# Patient Record
Sex: MALE | Race: WHITE | NOT HISPANIC OR LATINO | URBAN - METROPOLITAN AREA
[De-identification: names, ages, dates, MRNs, and addresses within clinical notes are randomized per-mention and may not be internally consistent; named-entity substitution may affect disease eponyms.]

---

## 2017-04-03 ENCOUNTER — OUTPATIENT (OUTPATIENT)
Dept: OUTPATIENT SERVICES | Facility: HOSPITAL | Age: 72
LOS: 1 days | Discharge: HOME | End: 2017-04-03

## 2017-06-27 DIAGNOSIS — K85.90 ACUTE PANCREATITIS WITHOUT NECROSIS OR INFECTION, UNSPECIFIED: ICD-10-CM

## 2021-03-18 ENCOUNTER — INPATIENT (INPATIENT)
Facility: HOSPITAL | Age: 76
LOS: 5 days | Discharge: HOME HEALTH | End: 2021-03-24
Attending: HOSPITALIST | Admitting: HOSPITALIST
Payer: MEDICARE

## 2021-03-18 VITALS
SYSTOLIC BLOOD PRESSURE: 176 MMHG | TEMPERATURE: 98 F | OXYGEN SATURATION: 98 % | DIASTOLIC BLOOD PRESSURE: 80 MMHG | RESPIRATION RATE: 18 BRPM | HEART RATE: 68 BPM

## 2021-03-18 LAB
ALBUMIN SERPL ELPH-MCNC: 4.1 G/DL — SIGNIFICANT CHANGE UP (ref 3.5–5.2)
ALP SERPL-CCNC: 110 U/L — SIGNIFICANT CHANGE UP (ref 30–115)
ALT FLD-CCNC: 29 U/L — SIGNIFICANT CHANGE UP (ref 0–41)
ANION GAP SERPL CALC-SCNC: 16 MMOL/L — HIGH (ref 7–14)
APTT BLD: 34.2 SEC — SIGNIFICANT CHANGE UP (ref 27–39.2)
AST SERPL-CCNC: 27 U/L — SIGNIFICANT CHANGE UP (ref 0–41)
BASOPHILS # BLD AUTO: 0.02 K/UL — SIGNIFICANT CHANGE UP (ref 0–0.2)
BASOPHILS NFR BLD AUTO: 0.8 % — SIGNIFICANT CHANGE UP (ref 0–1)
BILIRUB SERPL-MCNC: 0.6 MG/DL — SIGNIFICANT CHANGE UP (ref 0.2–1.2)
BUN SERPL-MCNC: 92 MG/DL — CRITICAL HIGH (ref 10–20)
CALCIUM SERPL-MCNC: 7.2 MG/DL — LOW (ref 8.5–10.1)
CHLORIDE SERPL-SCNC: 95 MMOL/L — LOW (ref 98–110)
CO2 SERPL-SCNC: 22 MMOL/L — SIGNIFICANT CHANGE UP (ref 17–32)
CREAT SERPL-MCNC: 4.9 MG/DL — CRITICAL HIGH (ref 0.7–1.5)
EOSINOPHIL # BLD AUTO: 0.14 K/UL — SIGNIFICANT CHANGE UP (ref 0–0.7)
EOSINOPHIL NFR BLD AUTO: 5.8 % — SIGNIFICANT CHANGE UP (ref 0–8)
ETHANOL SERPL-MCNC: <10 MG/DL — SIGNIFICANT CHANGE UP
GLUCOSE BLDC GLUCOMTR-MCNC: 190 MG/DL — HIGH (ref 70–99)
GLUCOSE SERPL-MCNC: 273 MG/DL — HIGH (ref 70–99)
HCT VFR BLD CALC: 28.6 % — LOW (ref 42–52)
HGB BLD-MCNC: 9.6 G/DL — LOW (ref 14–18)
IMM GRANULOCYTES NFR BLD AUTO: 0.4 % — HIGH (ref 0.1–0.3)
INR BLD: 1.1 RATIO — SIGNIFICANT CHANGE UP (ref 0.65–1.3)
LYMPHOCYTES # BLD AUTO: 0.53 K/UL — LOW (ref 1.2–3.4)
LYMPHOCYTES # BLD AUTO: 21.8 % — SIGNIFICANT CHANGE UP (ref 20.5–51.1)
MCHC RBC-ENTMCNC: 30.5 PG — SIGNIFICANT CHANGE UP (ref 27–31)
MCHC RBC-ENTMCNC: 33.6 G/DL — SIGNIFICANT CHANGE UP (ref 32–37)
MCV RBC AUTO: 90.8 FL — SIGNIFICANT CHANGE UP (ref 80–94)
MONOCYTES # BLD AUTO: 0.18 K/UL — SIGNIFICANT CHANGE UP (ref 0.1–0.6)
MONOCYTES NFR BLD AUTO: 7.4 % — SIGNIFICANT CHANGE UP (ref 1.7–9.3)
NEUTROPHILS # BLD AUTO: 1.55 K/UL — SIGNIFICANT CHANGE UP (ref 1.4–6.5)
NEUTROPHILS NFR BLD AUTO: 63.8 % — SIGNIFICANT CHANGE UP (ref 42.2–75.2)
NRBC # BLD: 0 /100 WBCS — SIGNIFICANT CHANGE UP (ref 0–0)
PLATELET # BLD AUTO: 173 K/UL — SIGNIFICANT CHANGE UP (ref 130–400)
POTASSIUM SERPL-MCNC: 4.5 MMOL/L — SIGNIFICANT CHANGE UP (ref 3.5–5)
POTASSIUM SERPL-SCNC: 4.5 MMOL/L — SIGNIFICANT CHANGE UP (ref 3.5–5)
PROT SERPL-MCNC: 7.1 G/DL — SIGNIFICANT CHANGE UP (ref 6–8)
PROTHROM AB SERPL-ACNC: 12.7 SEC — SIGNIFICANT CHANGE UP (ref 9.95–12.87)
RBC # BLD: 3.15 M/UL — LOW (ref 4.7–6.1)
RBC # FLD: 13.2 % — SIGNIFICANT CHANGE UP (ref 11.5–14.5)
SARS-COV-2 RNA SPEC QL NAA+PROBE: SIGNIFICANT CHANGE UP
SODIUM SERPL-SCNC: 133 MMOL/L — LOW (ref 135–146)
WBC # BLD: 2.43 K/UL — LOW (ref 4.8–10.8)
WBC # FLD AUTO: 2.43 K/UL — LOW (ref 4.8–10.8)

## 2021-03-18 PROCEDURE — 73552 X-RAY EXAM OF FEMUR 2/>: CPT | Mod: 26,LT

## 2021-03-18 PROCEDURE — 71045 X-RAY EXAM CHEST 1 VIEW: CPT | Mod: 26

## 2021-03-18 PROCEDURE — 99285 EMERGENCY DEPT VISIT HI MDM: CPT | Mod: CS,GC

## 2021-03-18 PROCEDURE — 72170 X-RAY EXAM OF PELVIS: CPT | Mod: 26

## 2021-03-18 RX ORDER — INSULIN GLARGINE 100 [IU]/ML
10 INJECTION, SOLUTION SUBCUTANEOUS AT BEDTIME
Refills: 0 | Status: DISCONTINUED | OUTPATIENT
Start: 2021-03-18 | End: 2021-03-20

## 2021-03-18 RX ORDER — SODIUM CHLORIDE 9 MG/ML
1000 INJECTION, SOLUTION INTRAVENOUS
Refills: 0 | Status: DISCONTINUED | OUTPATIENT
Start: 2021-03-18 | End: 2021-03-20

## 2021-03-18 RX ORDER — CHLORHEXIDINE GLUCONATE 213 G/1000ML
1 SOLUTION TOPICAL
Refills: 0 | Status: DISCONTINUED | OUTPATIENT
Start: 2021-03-18 | End: 2021-03-20

## 2021-03-18 RX ORDER — GLUCAGON INJECTION, SOLUTION 0.5 MG/.1ML
1 INJECTION, SOLUTION SUBCUTANEOUS ONCE
Refills: 0 | Status: DISCONTINUED | OUTPATIENT
Start: 2021-03-18 | End: 2021-03-20

## 2021-03-18 RX ORDER — SODIUM CHLORIDE 9 MG/ML
1000 INJECTION INTRAMUSCULAR; INTRAVENOUS; SUBCUTANEOUS
Refills: 0 | Status: DISCONTINUED | OUTPATIENT
Start: 2021-03-18 | End: 2021-03-20

## 2021-03-18 RX ORDER — INSULIN LISPRO 100/ML
3 VIAL (ML) SUBCUTANEOUS
Refills: 0 | Status: DISCONTINUED | OUTPATIENT
Start: 2021-03-18 | End: 2021-03-20

## 2021-03-18 RX ORDER — DEXTROSE 50 % IN WATER 50 %
25 SYRINGE (ML) INTRAVENOUS ONCE
Refills: 0 | Status: DISCONTINUED | OUTPATIENT
Start: 2021-03-18 | End: 2021-03-20

## 2021-03-18 RX ORDER — DEXTROSE 50 % IN WATER 50 %
15 SYRINGE (ML) INTRAVENOUS ONCE
Refills: 0 | Status: DISCONTINUED | OUTPATIENT
Start: 2021-03-18 | End: 2021-03-20

## 2021-03-18 RX ORDER — POLYETHYLENE GLYCOL 3350 17 G/17G
17 POWDER, FOR SOLUTION ORAL DAILY
Refills: 0 | Status: DISCONTINUED | OUTPATIENT
Start: 2021-03-18 | End: 2021-03-20

## 2021-03-18 RX ORDER — MORPHINE SULFATE 50 MG/1
2 CAPSULE, EXTENDED RELEASE ORAL EVERY 6 HOURS
Refills: 0 | Status: DISCONTINUED | OUTPATIENT
Start: 2021-03-18 | End: 2021-03-19

## 2021-03-18 RX ORDER — DEXTROSE 50 % IN WATER 50 %
12.5 SYRINGE (ML) INTRAVENOUS ONCE
Refills: 0 | Status: DISCONTINUED | OUTPATIENT
Start: 2021-03-18 | End: 2021-03-20

## 2021-03-18 RX ORDER — MORPHINE SULFATE 50 MG/1
4 CAPSULE, EXTENDED RELEASE ORAL ONCE
Refills: 0 | Status: DISCONTINUED | OUTPATIENT
Start: 2021-03-18 | End: 2021-03-18

## 2021-03-18 RX ORDER — INSULIN LISPRO 100/ML
VIAL (ML) SUBCUTANEOUS
Refills: 0 | Status: DISCONTINUED | OUTPATIENT
Start: 2021-03-18 | End: 2021-03-20

## 2021-03-18 RX ADMIN — INSULIN GLARGINE 10 UNIT(S): 100 INJECTION, SOLUTION SUBCUTANEOUS at 23:21

## 2021-03-18 RX ADMIN — SODIUM CHLORIDE 75 MILLILITER(S): 9 INJECTION INTRAMUSCULAR; INTRAVENOUS; SUBCUTANEOUS at 23:25

## 2021-03-18 RX ADMIN — MORPHINE SULFATE 2 MILLIGRAM(S): 50 CAPSULE, EXTENDED RELEASE ORAL at 23:24

## 2021-03-18 RX ADMIN — MORPHINE SULFATE 4 MILLIGRAM(S): 50 CAPSULE, EXTENDED RELEASE ORAL at 18:58

## 2021-03-18 NOTE — ED PROVIDER NOTE - CARE PLAN
Principal Discharge DX:	Closed nondisplaced intertrochanteric fracture of left femur, initial encounter  Secondary Diagnosis:	CKD (chronic kidney disease), stage V  Secondary Diagnosis:	Anemia

## 2021-03-18 NOTE — ED PROVIDER NOTE - PROGRESS NOTE DETAILS
BI: unable to reach ortho at this time. Placed official consult. Endorsed to MAR. They will try for ortho consult as well. BI: d/w ortho resident joey

## 2021-03-18 NOTE — ED PROVIDER NOTE - OBJECTIVE STATEMENT
75 y.o M w/ pmhx CKD V, CAD s/p PCI, DM p/w mechanical trip and fall outside landing on his L bottom pta. Pt complaining of L hip pain and hasn't been able to stand or ambulate since fall. No HA, no neck pain, no HT, no LOC, no CP, no SOB, no back pain, no n/v, no abd pain, no numbness or tingling.

## 2021-03-18 NOTE — ED PROVIDER NOTE - ATTENDING CONTRIBUTION TO CARE
74 yo M pmh of DM, CAD, CKD presents after a mechanical fall. Was in his driveway that he describes as steep. States that he slipped and fell onto left hip. Felt immediate pain and has since been unable to ambulate. no head trauma, no loc, no blood thinning medications. no other injuries or pain besides his left hip.     CONSTITUTIONAL: Well-developed; well-nourished; in no acute distress.   SKIN: warm, dry  HEAD: Normocephalic; atraumatic.  EYES: PERRL, EOMI, no conjunctival erythema  ENT: No nasal discharge; airway clear.  NECK: Supple; non tender.  CARD: S1, S2 normal;  Regular rate and rhythm.   RESP: No wheezes, rales or rhonchi.  ABD: soft non tender, non distended, no rebound or guarding  EXT: Normal ROM.  5/5 strength in all 4 extremities + tenderness to the left hip, no leg shortening or rotation, range of motion limited due to pain. neurovascularly intact.   LYMPH: No acute cervical adenopathy.  NEURO: Alert, oriented, grossly unremarkable. neurovascularly intact  PSYCH: Cooperative, appropriate.

## 2021-03-18 NOTE — CONSULT NOTE ADULT - SUBJECTIVE AND OBJECTIVE BOX
Orthopedics Consult Note:    This is a 75y Male who presents to the ED today with c/o left hip pain and inability to bear weight s/p mechanical fall today. Pt denies any other injuries. Pt denies head trauma or LOC. Pt denies any numbness, tingling or parethesias. Pt denies fever or chills.    Past Medical & Surgical History:  CKD (chronic kidney disease)  CAD (coronary artery disease)  DM (diabetes mellitus)  HTN  HLD    Allergies:  No Known Allergies      Vital Signs:  T(C): 36.4 (03-18-21 @ 18:15), Max: 36.4 (03-18-21 @ 18:15)  HR: 68 (03-18-21 @ 18:15) (68 - 68)  BP: 176/80 (03-18-21 @ 18:15) (176/80 - 176/80)  RR: 18 (03-18-21 @ 18:15) (18 - 18)  SpO2: 98% (03-18-21 @ 18:15) (98% - 98%)    Labs:                        9.6    2.43  )-----------( 173      ( 18 Mar 2021 19:10 )             28.6   03-18    133<L>  |  95<L>  |  92<HH>  ----------------------------<  273<H>  4.5   |  22  |  4.9<HH>    Ca    7.2<L>      18 Mar 2021 19:10    TPro  7.1  /  Alb  4.1  /  TBili  0.6  /  DBili  x   /  AST  27  /  ALT  29  /  AlkPhos  110  03-18  PT/INR - ( 18 Mar 2021 19:10 )   PT: 12.70 sec;   INR: 1.10 ratio         PTT - ( 18 Mar 2021 19:10 )  PTT:34.2 sec    X-rays of the pelvis, left hip and femur demonstrate intertrochanteric hip fracture.    PE  NAD, alert and oriented  Non labored respirations on RA  Left hip:  Extremity shortened and externally rotated, +mild swelling, no ecchymosis, no erythema, skin intact, normothermic. +TTP over groin. LROM 2' pain. Moving all toes, sensation intact. DP and PT pulses 2+.    Secondary Survey:  Righ knee, ankle and B/L UEs with no swelling, no ecchymoses, no abrasions, no lacerations or any other signs of injury with full painless baseline ROM and no bony TTP. Able to SLR RLE**/**LLE. Sensation intact distally, moving all digits. Distal pulses intact.     Spine and ribs with no bony TTP.     Assessment:  75y Male with a left IT hip fracture s/p mechanical fall today.    Plan:  Pt and family advised that surgical fixation of left hip fracture with is necessary.  Surgical procedure discussed in detail with pt and family including all R/B/As; Pt and daughter expressed understanding  Admit to Medical service for optimization and medical clearance.  Please obtain CT of entire left femur for surgical planning  Patient will require cardiac clearance  f/u medical clearance.  f/u labs/imaging.  Complete bedrest.  Pain control.  NPO and hold chemical anticoagulation after midnight for possible OR tomorrow pending medical optimization and clearance.   DVT ppx 1 dose HSQ now, SCDs    Case discussed with Dr. Carrillo who agrees with plan. Orthopedics Consult Note:    This is a 75y Male who presents to the ED today with c/o left hip pain and inability to bear weight s/p mechanical fall today. Pt denies any other injuries. Pt denies head trauma or LOC. Pt denies any numbness, tingling or parethesias. Pt denies fever or chills.    Past Medical & Surgical History:  CKD (chronic kidney disease)  CAD (coronary artery disease)  DM (diabetes mellitus)  HTN  HLD    Allergies:  No Known Allergies      Vital Signs:  T(C): 36.4 (03-18-21 @ 18:15), Max: 36.4 (03-18-21 @ 18:15)  HR: 68 (03-18-21 @ 18:15) (68 - 68)  BP: 176/80 (03-18-21 @ 18:15) (176/80 - 176/80)  RR: 18 (03-18-21 @ 18:15) (18 - 18)  SpO2: 98% (03-18-21 @ 18:15) (98% - 98%)    Labs:                        9.6    2.43  )-----------( 173      ( 18 Mar 2021 19:10 )             28.6   03-18    133<L>  |  95<L>  |  92<HH>  ----------------------------<  273<H>  4.5   |  22  |  4.9<HH>    Ca    7.2<L>      18 Mar 2021 19:10    TPro  7.1  /  Alb  4.1  /  TBili  0.6  /  DBili  x   /  AST  27  /  ALT  29  /  AlkPhos  110  03-18  PT/INR - ( 18 Mar 2021 19:10 )   PT: 12.70 sec;   INR: 1.10 ratio         PTT - ( 18 Mar 2021 19:10 )  PTT:34.2 sec    X-rays of the pelvis, left hip and femur demonstrate intertrochanteric hip fracture.    PE  NAD, alert and oriented  Non labored respirations on RA  Left hip:  Extremity shortened and externally rotated, +mild swelling, no ecchymosis, no erythema, skin intact, normothermic. +TTP over groin. LROM 2' pain. Moving all toes, sensation intact. DP and PT pulses 2+.    Secondary Survey:  Righ knee, ankle and B/L UEs with no swelling, no ecchymoses, no abrasions, no lacerations or any other signs of injury with full painless baseline ROM and no bony TTP. Able to SLR RLE**/**LLE. Sensation intact distally, moving all digits. Distal pulses intact.     Spine and ribs with no bony TTP.     Assessment:  75y Male with a left IT hip fracture s/p mechanical fall today.    Plan:  Pt and family advised that surgical fixation of left hip fracture with is necessary.  Surgical procedure discussed in detail with pt and family including all R/B/As; Pt and daughter expressed understanding  Admit to Medical service for optimization and medical clearance.  Please obtain CT of entire left femur for surgical planning  f/u medical clearance.  f/u labs/imaging.  Complete bedrest.  Pain control.  NPO and hold chemical anticoagulation after midnight for possible OR tomorrow pending medical optimization and clearance.   DVT ppx 1 dose HSQ now, SCDs    Case discussed with Dr. Carrillo who agrees with plan.

## 2021-03-18 NOTE — ED ADULT NURSE NOTE - NSIMPLEMENTINTERV_GEN_ALL_ED
Implemented All Fall with Harm Risk Interventions:  [place] to call system. Call bell, personal items and telephone within reach. Instruct patient to call for assistance. Room bathroom lighting operational. Non-slip footwear when patient is off stretcher. Physically safe environment: no spills, clutter or unnecessary equipment. Stretcher in lowest position, wheels locked, appropriate side rails in place. Provide visual cue, wrist band, yellow gown, etc. Monitor gait and stability. Monitor for mental status changes and reorient to person, place, and time. Review medications for side effects contributing to fall risk. Reinforce activity limits and safety measures with patient and family. Provide visual clues: red socks.

## 2021-03-18 NOTE — ED PROVIDER NOTE - CLINICAL SUMMARY MEDICAL DECISION MAKING FREE TEXT BOX
Patient presents after a mechanical fall. xray, labs done. Found to have a left intertrochanteric fracture. Pain medications given. Ortho consulted. Admitted for further management.

## 2021-03-18 NOTE — ED PROVIDER NOTE - ATTENDING WITH...
Resident Clofazimine Counseling:  I discussed with the patient the risks of clofazimine including but not limited to skin and eye pigmentation, liver damage, nausea/vomiting, gastrointestinal bleeding and allergy.

## 2021-03-18 NOTE — ED PROVIDER NOTE - PHYSICAL EXAMINATION
CONSTITUTIONAL: well-appearing, in NAD, GCS 15  SKIN: Warm dry, normal skin turgor, no abrasions or lacerations.  HEAD: NCAT  EYES: EOMI, PERRLA, no scleral icterus, conjunctiva pink  ENT: no epistaxis or blood in the oropharynx  NECK: Supple; non tender. Full ROM. trachea midline.  CARD: RRR, no murmurs.  THORAX: no chest wall tenderness.  RESP: b/l breath sounds CTABL  ABD: soft, non-tender, non-distended, no rebound or guarding.  EXT: Unable to lift LLE due to hip pain, otherwise Full ROM, Bony tenderness over L greater trochanter, no pedal edema, no calf tenderness, stable pelvis, no spinal back tenderness. peripheral pulses intact and symmetrical.  NEURO: distal neuro intact and symmetrical.  PSYCH: Cooperative, appropriate.

## 2021-03-19 LAB
24R-OH-CALCIDIOL SERPL-MCNC: 14 NG/ML — LOW (ref 30–80)
A1C WITH ESTIMATED AVERAGE GLUCOSE RESULT: 6.4 % — HIGH (ref 4–5.6)
ALBUMIN SERPL ELPH-MCNC: 3.9 G/DL — SIGNIFICANT CHANGE UP (ref 3.5–5.2)
ALP SERPL-CCNC: 107 U/L — SIGNIFICANT CHANGE UP (ref 30–115)
ALT FLD-CCNC: 26 U/L — SIGNIFICANT CHANGE UP (ref 0–41)
ANION GAP SERPL CALC-SCNC: 16 MMOL/L — HIGH (ref 7–14)
AST SERPL-CCNC: 20 U/L — SIGNIFICANT CHANGE UP (ref 0–41)
BASOPHILS # BLD AUTO: 0.02 K/UL — SIGNIFICANT CHANGE UP (ref 0–0.2)
BASOPHILS NFR BLD AUTO: 0.6 % — SIGNIFICANT CHANGE UP (ref 0–1)
BILIRUB SERPL-MCNC: 0.5 MG/DL — SIGNIFICANT CHANGE UP (ref 0.2–1.2)
BUN SERPL-MCNC: 87 MG/DL — CRITICAL HIGH (ref 10–20)
CALCIUM SERPL-MCNC: 7.1 MG/DL — LOW (ref 8.5–10.1)
CHLORIDE SERPL-SCNC: 96 MMOL/L — LOW (ref 98–110)
CHOLEST SERPL-MCNC: 150 MG/DL — SIGNIFICANT CHANGE UP
CO2 SERPL-SCNC: 23 MMOL/L — SIGNIFICANT CHANGE UP (ref 17–32)
CREAT SERPL-MCNC: 4.5 MG/DL — CRITICAL HIGH (ref 0.7–1.5)
EOSINOPHIL # BLD AUTO: 0.06 K/UL — SIGNIFICANT CHANGE UP (ref 0–0.7)
EOSINOPHIL NFR BLD AUTO: 1.7 % — SIGNIFICANT CHANGE UP (ref 0–8)
ESTIMATED AVERAGE GLUCOSE: 137 MG/DL — HIGH (ref 68–114)
GLUCOSE BLDC GLUCOMTR-MCNC: 100 MG/DL — HIGH (ref 70–99)
GLUCOSE BLDC GLUCOMTR-MCNC: 112 MG/DL — HIGH (ref 70–99)
GLUCOSE BLDC GLUCOMTR-MCNC: 128 MG/DL — HIGH (ref 70–99)
GLUCOSE BLDC GLUCOMTR-MCNC: 83 MG/DL — SIGNIFICANT CHANGE UP (ref 70–99)
GLUCOSE BLDC GLUCOMTR-MCNC: 87 MG/DL — SIGNIFICANT CHANGE UP (ref 70–99)
GLUCOSE SERPL-MCNC: 124 MG/DL — HIGH (ref 70–99)
HCT VFR BLD CALC: 27.7 % — LOW (ref 42–52)
HCT VFR BLD CALC: 28.3 % — LOW (ref 42–52)
HCV AB S/CO SERPL IA: 0.04 COI — SIGNIFICANT CHANGE UP
HCV AB SERPL-IMP: SIGNIFICANT CHANGE UP
HDLC SERPL-MCNC: 37 MG/DL — LOW
HGB BLD-MCNC: 9.2 G/DL — LOW (ref 14–18)
HGB BLD-MCNC: 9.4 G/DL — LOW (ref 14–18)
IMM GRANULOCYTES NFR BLD AUTO: 0.3 % — SIGNIFICANT CHANGE UP (ref 0.1–0.3)
LACTATE SERPL-SCNC: 0.7 MMOL/L — SIGNIFICANT CHANGE UP (ref 0.7–2)
LIPID PNL WITH DIRECT LDL SERPL: 107 MG/DL — HIGH
LYMPHOCYTES # BLD AUTO: 0.49 K/UL — LOW (ref 1.2–3.4)
LYMPHOCYTES # BLD AUTO: 13.7 % — LOW (ref 20.5–51.1)
MAGNESIUM SERPL-MCNC: 1.8 MG/DL — SIGNIFICANT CHANGE UP (ref 1.8–2.4)
MCHC RBC-ENTMCNC: 30.4 PG — SIGNIFICANT CHANGE UP (ref 27–31)
MCHC RBC-ENTMCNC: 30.6 PG — SIGNIFICANT CHANGE UP (ref 27–31)
MCHC RBC-ENTMCNC: 33.2 G/DL — SIGNIFICANT CHANGE UP (ref 32–37)
MCHC RBC-ENTMCNC: 33.2 G/DL — SIGNIFICANT CHANGE UP (ref 32–37)
MCV RBC AUTO: 91.6 FL — SIGNIFICANT CHANGE UP (ref 80–94)
MCV RBC AUTO: 92 FL — SIGNIFICANT CHANGE UP (ref 80–94)
MONOCYTES # BLD AUTO: 0.24 K/UL — SIGNIFICANT CHANGE UP (ref 0.1–0.6)
MONOCYTES NFR BLD AUTO: 6.7 % — SIGNIFICANT CHANGE UP (ref 1.7–9.3)
NEUTROPHILS # BLD AUTO: 2.75 K/UL — SIGNIFICANT CHANGE UP (ref 1.4–6.5)
NEUTROPHILS NFR BLD AUTO: 77 % — HIGH (ref 42.2–75.2)
NON HDL CHOLESTEROL: 113 MG/DL — SIGNIFICANT CHANGE UP
NRBC # BLD: 0 /100 WBCS — SIGNIFICANT CHANGE UP (ref 0–0)
NRBC # BLD: 0 /100 WBCS — SIGNIFICANT CHANGE UP (ref 0–0)
PHOSPHATE SERPL-MCNC: 5.8 MG/DL — HIGH (ref 2.1–4.9)
PLATELET # BLD AUTO: 179 K/UL — SIGNIFICANT CHANGE UP (ref 130–400)
PLATELET # BLD AUTO: 185 K/UL — SIGNIFICANT CHANGE UP (ref 130–400)
POTASSIUM SERPL-MCNC: 3.9 MMOL/L — SIGNIFICANT CHANGE UP (ref 3.5–5)
POTASSIUM SERPL-SCNC: 3.9 MMOL/L — SIGNIFICANT CHANGE UP (ref 3.5–5)
PROT SERPL-MCNC: 6.6 G/DL — SIGNIFICANT CHANGE UP (ref 6–8)
RBC # BLD: 3.01 M/UL — LOW (ref 4.7–6.1)
RBC # BLD: 3.09 M/UL — LOW (ref 4.7–6.1)
RBC # FLD: 13.2 % — SIGNIFICANT CHANGE UP (ref 11.5–14.5)
RBC # FLD: 13.3 % — SIGNIFICANT CHANGE UP (ref 11.5–14.5)
SODIUM SERPL-SCNC: 135 MMOL/L — SIGNIFICANT CHANGE UP (ref 135–146)
TRIGL SERPL-MCNC: 74 MG/DL — SIGNIFICANT CHANGE UP
WBC # BLD: 3.5 K/UL — LOW (ref 4.8–10.8)
WBC # BLD: 3.57 K/UL — LOW (ref 4.8–10.8)
WBC # FLD AUTO: 3.5 K/UL — LOW (ref 4.8–10.8)
WBC # FLD AUTO: 3.57 K/UL — LOW (ref 4.8–10.8)

## 2021-03-19 PROCEDURE — 93306 TTE W/DOPPLER COMPLETE: CPT | Mod: 26

## 2021-03-19 PROCEDURE — 99222 1ST HOSP IP/OBS MODERATE 55: CPT

## 2021-03-19 PROCEDURE — 93010 ELECTROCARDIOGRAM REPORT: CPT

## 2021-03-19 PROCEDURE — 73700 CT LOWER EXTREMITY W/O DYE: CPT | Mod: 26,LT

## 2021-03-19 PROCEDURE — 71045 X-RAY EXAM CHEST 1 VIEW: CPT | Mod: 26

## 2021-03-19 RX ORDER — CALCIUM ACETATE 667 MG
667 TABLET ORAL
Refills: 0 | Status: DISCONTINUED | OUTPATIENT
Start: 2021-03-19 | End: 2021-03-20

## 2021-03-19 RX ORDER — HEPARIN SODIUM 5000 [USP'U]/ML
5000 INJECTION INTRAVENOUS; SUBCUTANEOUS ONCE
Refills: 0 | Status: COMPLETED | OUTPATIENT
Start: 2021-03-19 | End: 2021-03-19

## 2021-03-19 RX ORDER — ATORVASTATIN CALCIUM 80 MG/1
20 TABLET, FILM COATED ORAL AT BEDTIME
Refills: 0 | Status: DISCONTINUED | OUTPATIENT
Start: 2021-03-19 | End: 2021-03-20

## 2021-03-19 RX ORDER — CALCITRIOL 0.5 UG/1
0.25 CAPSULE ORAL DAILY
Refills: 0 | Status: DISCONTINUED | OUTPATIENT
Start: 2021-03-19 | End: 2021-03-19

## 2021-03-19 RX ORDER — OXYCODONE HYDROCHLORIDE 5 MG/1
5 TABLET ORAL EVERY 4 HOURS
Refills: 0 | Status: DISCONTINUED | OUTPATIENT
Start: 2021-03-19 | End: 2021-03-20

## 2021-03-19 RX ORDER — OXYCODONE HYDROCHLORIDE 5 MG/1
10 TABLET ORAL EVERY 6 HOURS
Refills: 0 | Status: DISCONTINUED | OUTPATIENT
Start: 2021-03-19 | End: 2021-03-19

## 2021-03-19 RX ORDER — HEPARIN SODIUM 5000 [USP'U]/ML
5000 INJECTION INTRAVENOUS; SUBCUTANEOUS EVERY 12 HOURS
Refills: 0 | Status: DISCONTINUED | OUTPATIENT
Start: 2021-03-19 | End: 2021-03-19

## 2021-03-19 RX ORDER — INFLUENZA VIRUS VACCINE 15; 15; 15; 15 UG/.5ML; UG/.5ML; UG/.5ML; UG/.5ML
0.5 SUSPENSION INTRAMUSCULAR ONCE
Refills: 0 | Status: DISCONTINUED | OUTPATIENT
Start: 2021-03-19 | End: 2021-03-24

## 2021-03-19 RX ORDER — METOPROLOL TARTRATE 50 MG
100 TABLET ORAL DAILY
Refills: 0 | Status: DISCONTINUED | OUTPATIENT
Start: 2021-03-19 | End: 2021-03-20

## 2021-03-19 RX ORDER — CALCITRIOL 0.5 UG/1
0.5 CAPSULE ORAL DAILY
Refills: 0 | Status: DISCONTINUED | OUTPATIENT
Start: 2021-03-19 | End: 2021-03-20

## 2021-03-19 RX ADMIN — Medication 100 MILLIGRAM(S): at 06:13

## 2021-03-19 RX ADMIN — ATORVASTATIN CALCIUM 20 MILLIGRAM(S): 80 TABLET, FILM COATED ORAL at 22:32

## 2021-03-19 RX ADMIN — SODIUM CHLORIDE 75 MILLILITER(S): 9 INJECTION INTRAMUSCULAR; INTRAVENOUS; SUBCUTANEOUS at 22:50

## 2021-03-19 RX ADMIN — HEPARIN SODIUM 5000 UNIT(S): 5000 INJECTION INTRAVENOUS; SUBCUTANEOUS at 00:42

## 2021-03-19 RX ADMIN — INSULIN GLARGINE 10 UNIT(S): 100 INJECTION, SOLUTION SUBCUTANEOUS at 22:32

## 2021-03-19 NOTE — H&P ADULT - NSHPLABSRESULTS_GEN_ALL_CORE
cbc                        9.6    2.43  )-----------( 173      ( 18 Mar 2021 19:10 )             28.6     03-18    133<L>  |  95<L>  |  92<HH>  ----------------------------<  273<H>  4.5   |  22  |  4.9<HH>    Ca    7.2<L>      18 Mar 2021 19:10    TPro  7.1  /  Alb  4.1  /  TBili  0.6  /  DBili  x   /  AST  27  /  ALT  29  /  AlkPhos  110  03-18      Xray Femur 2 Views, Left (03.18.21 @ 19:47) >    impression:    Acute mildly displaced left femoral intertrochanteric fracture. Osteopenia. Vascular calcifications. Degenerative changes of the spine.

## 2021-03-19 NOTE — CONSULT NOTE ADULT - ATTENDING COMMENTS
Pt. seen/ examined  Comfortable in bed  No additional injuries  XR/ CT/ labs/ vitals reviewed  Discussed risks/ benefits/ alternatives of the surgery with family/ patient  Will proceed to OR for L femur cephalomedullary nailing
Seen / examined and above reviewed.    CAD s/p CABG  ICM  Chronic Systolic CHF    Multiple comorbidities as above.    Hip fracture secondary top mechanical fall pending surgery.    No chest pain / dyspnea.  Hemodynamics stable.  Compensated / euvolemic.    EKG: NSR, Ant Infarct  ECHO: Severe LV Dysfunction.    High risk patient for perioperative cardiac events (RCRI = 4).  Intermediate risk procedure.  No cardiac decompensation.    No further cardiac testing required prior to above surgery.  Continue beta-blocker perioperatively.

## 2021-03-19 NOTE — CONSULT NOTE ADULT - ASSESSMENT
* SUMMARY:    * Patient-based characteristics (Functional capacity)  Patient is able to achieve more than 4 MET (walk 4 blocks, climb 2 flights of stairs, etc...)          Y [X] / N []    High-risk patient features:  - Recent (<30 days) or active MI          Y [] / N [X]  - Unstable or severe angina          Y [] / N [X]  - Decompensated heart failure, or worsening or new-onset heart failure          Y [] / N [X]  - Severe valvular disease          Y [] / N [X]  - Significant arrhythmia (Tachy- or Bradyarrhythmia)          Y [] / N [X]    * Surgery/Procedure-based characteristics (Type of surgery)  - Low-risk procedure (outpatient procedure, elective, endoscopy, etc...)          Y [] / N []  - Elevated or Moderate-risk procedure (Inpatient)          Y [] / N []  - High-risk procedure (urgent/emergent procedure, Intrathoracic, vascular, etc...)          Y [] / N []    * Revised Cardiac Risk Index (RCRI)  1- History of ischemic heart disease          Y [X] / N []  2- History of congestive heart failure          Y [] / N [X]  3- History of stroke/TIA          Y [] / N [X]  4- History of insulin-dependent diabetes          Y [] / N [X]  5- Chronic kidney disease (Cr >2mg/dL)          Y [] / N [X]  6- Undergoing suprainguinal vascular, intraperitoneal, or intrathoracic surgery          Y [] / N [X]    Class I risk (Zero factors) --> 3.9% (30-day risk of death, MI, or cardiac arrest)  Class II risk (One factor) --> 6% risk (30-day risk of death, MI, or cardiac arrest)  Class III risk (Two factors) --> 10.1% risk (30-day risk of death, MI, or cardiac arrest)  Class IV risk (Three factors or more) --> >15% risk (30-day risk of death, MI, or cardiac arrest)    * IMPRESSION & RECOMMENDATIONS:  Patient has a moderate risk of cardiac complication for moderate risk procedure.  * SUMMARY:    * Patient-based characteristics (Functional capacity)  Patient is able to achieve more than 4 MET (walk 4 blocks, climb 2 flights of stairs, etc...)          Y [X] / N []    High-risk patient features:  - Recent (<30 days) or active MI          Y [] / N [X]  - Unstable or severe angina          Y [] / N [X]  - Decompensated heart failure, or worsening or new-onset heart failure          Y [] / N [X]  - Severe valvular disease          Y [] / N [X]  - Significant arrhythmia (Tachy- or Bradyarrhythmia)          Y [] / N [X]    * Surgery/Procedure-based characteristics (Type of surgery)  - Low-risk procedure (outpatient procedure, elective, endoscopy, etc...)          Y [] / N []  - Elevated or Moderate-risk procedure (Inpatient)          Y [] / N []  - High-risk procedure (urgent/emergent procedure, Intrathoracic, vascular, etc...)          Y [] / N []    * Revised Cardiac Risk Index (RCRI)  1- History of ischemic heart disease          Y [X] / N []  2- History of congestive heart failure          Y [X] / N []  3- History of stroke/TIA          Y [] / N [X]  4- History of insulin-dependent diabetes          Y [X] / N []  5- Chronic kidney disease (Cr >2mg/dL)          Y [X] / N []  6- Undergoing suprainguinal vascular, intraperitoneal, or intrathoracic surgery          Y [] / N [X]    Class I risk (Zero factors) --> 3.9% (30-day risk of death, MI, or cardiac arrest)  Class II risk (One factor) --> 6% risk (30-day risk of death, MI, or cardiac arrest)  Class III risk (Two factors) --> 10.1% risk (30-day risk of death, MI, or cardiac arrest)  Class IV risk (Three factors or more) --> >15% risk (30-day risk of death, MI, or cardiac arrest)      * IMPRESSION & RECOMMENDATIONS:  Patient not in active cp, acs, decompensated hf, without significant vhd and unstable tachy/demetra arrythmias.    Patient has a high risk of cardiac complication undergoing  moderate risk procedure.   No additional cardiac w/u needed at this time  Ok to Proceed with surgery as per ortho plans  Plans discussed with attd during rounds.

## 2021-03-19 NOTE — H&P ADULT - ASSESSMENT
75 year old male has medical history of CKD V, CAD s/p PCI and CABG 15+ years ago, DM, HLD admitted for left femur fracture secondary to mechanical fall due to slipping on wet floor    # S/p mechanical fall  # Left femur fracture  - Pending CT of left femur  - Patient is moderate to high risk, METS >4 ( hx of CAD, DM)  - Echo pending,     # Lower extremity Edema     75 year old male has medical history of CKD V, CAD s/p PCI and CABG 15+ years ago, DM, HLD admitted for left femur fracture secondary to mechanical fall due to slipping on wet floor    # S/p mechanical fall  # Left femur fracture  - Pending CT of left femur  - NPO, IVF, morphine PRN, stool softener  - Patient is moderate to high risk, METS >4 ( hx of CAD, DM)  - Echo pending,     # Lower extremity Edema  - evaluate Echo  - patient on Lasix 80 mg    # CAD s/p PCI and CABG 15 + years  - patient not on aspirin  - c/w statin     # CKD V, Anemia of chronic    # DM  - monitor fs  - c/w insulin regimen, adjust accordingly    #) MISC  Activity: IAT  DVT ppx: lovenox   GI ppx: no need  Diet: NPO  Code: Full  Dispo: OR for femur fracture  CHG wash

## 2021-03-19 NOTE — H&P ADULT - NSHPPHYSICALEXAM_GEN_ALL_CORE
GENERAL: NAD, well-developed, AAOx3  HEENT:  Atraumatic, Normocephalic. EOMI, PERRLA, conjunctiva and sclera clear, No JVD  PULMONARY: Clear to auscultation bilaterally; No wheeze  CARDIOVASCULAR: Regular rate and rhythm; No murmurs, rubs, or gallops  GASTROINTESTINAL: Soft, Nontender, Nondistended; Bowel sounds present  MUSCULOSKELETAL:  2+ Peripheral Pulses, left lower ext unable to lift,  NEUROLOGY: non-focal  SKIN: No rashes or lesions

## 2021-03-19 NOTE — CONSULT NOTE ADULT - SUBJECTIVE AND OBJECTIVE BOX
NEPHROLOGY CONSULTATION NOTE    WILBERT JULIAN  75y  Male  MRN-[id number]    CC:   Patient is a 75y old  Male who presents with a chief complaint of Left femoral fracture secondary to mechanical fall (19 Mar 2021 10:56)      HPI:  75 year old male has medical history of CKD V, CAD s/p PCI and CABG 15+ years ago, DM, Lower extremity edema and HLD presented with mechanical fall. Patient slipped on wet floor lost balance, denies hitting head, dizziness or loss of consciousness. On review of system patient denies Nausea, vomiting, fever, chills, headaches, SOB, weight loss, chest pain, abdominal pain, muscle weakness, lower extremity swelling, urinary or bowel habit changes. On physical, unable to lift left lower extremity due to pain, reports of having good sensation. Xray showed Left femoral fracture. Orthopedic team requesting medical and cardiac clearance for possible OR procedure tomorrow. (19 Mar 2021 02:38)      PAST MEDICAL & SURGICAL HISTORY:  HLD (hyperlipidemia)    HTN (hypertension)    CKD (chronic kidney disease)    CAD (coronary artery disease)    DM (diabetes mellitus)      Allergies:  No Known Allergies    Home Medications Reviewed  Hospital Medications:   MEDICATIONS  (STANDING):  atorvastatin 20 milliGRAM(s) Oral at bedtime  influenza   Vaccine 0.5 milliLiter(s) IntraMuscular once  insulin glargine Injectable (LANTUS) 10 Unit(s) SubCutaneous at bedtime  insulin lispro (ADMELOG) corrective regimen sliding scale   SubCutaneous three times a day before meals  insulin lispro Injectable (ADMELOG) 3 Unit(s) SubCutaneous three times a day before meals  metoprolol succinate  milliGRAM(s) Oral daily  polyethylene glycol 3350 17 Gram(s) Oral daily  sodium chloride 0.9%. 1000 milliLiter(s) (75 mL/Hr) IV Continuous <Continuous>    MEDICATIONS  (PRN):  oxyCODONE    IR 5 milliGRAM(s) Oral every 4 hours PRN Moderate Pain (4 - 6)    Home medications:  Home Medications:  furosemide 80 mg oral tablet: 1 tab(s) orally once a day (18 Mar 2021 23:37)  Procrit:  (18 Mar 2021 23:39)  Renvela 800 mg oral tablet: 1 tab(s) orally 3 times a day (with meals) (18 Mar 2021 23:38)  sodium bicarbonate 650 mg oral tablet: 1 tab(s) orally 3 times a day (18 Mar 2021 23:38)  Toprol- mg oral tablet, extended release: 1 tab(s) orally once a day (18 Mar 2021 23:37)  Vitamin D2 50,000 intl units (1.25 mg) oral capsule (obsolete): 1 cap(s) orally once a day (18 Mar 2021 23:39)      SOCIAL HISTORY:  Social History:  Formal smoker  denies drinking or use of illicit substance (19 Mar 2021 02:38)      FAMILY HISTORY:      REVIEW OF SYSTEMS:     All other review of systems is negative unless indicated above.    VITALS:  T(F): 96.5 (03-19-21 @ 14:18), Max: 98.3 (03-19-21 @ 00:29)  HR: 61 (03-19-21 @ 14:18)  BP: 138/63 (03-19-21 @ 14:18)  RR: 18 (03-19-21 @ 14:18)  SpO2: 98% (03-19-21 @ 06:10)    Height (cm): 170.2 (03-19 @ 06:10)  Weight (kg): 62.6 (03-19 @ 06:10)  BMI (kg/m2): 21.6 (03-19 @ 06:10)  BSA (m2): 1.73 (03-19 @ 06:10)  I&O's Detail    19 Mar 2021 07:01  -  19 Mar 2021 16:41  --------------------------------------------------------  IN:  Total IN: 0 mL    OUT:    Voided (mL): 700 mL  Total OUT: 700 mL    Total NET: -700 mL    I&O's Summary    19 Mar 2021 07:01  -  19 Mar 2021 16:41  --------------------------------------------------------  IN: 0 mL / OUT: 700 mL / NET: -700 mL      PHYSICAL EXAM:  Gen: NAD  resp: CTAB   card: S1/S2  abd: soft  ext: no edema, LLE limited ROM      LABS:  Daily Height in cm: 170.18 (19 Mar 2021 06:10)    Daily     03-19    135  |  96<L>  |  87<HH>  ----------------------------<  124<H>  3.9   |  23  |  4.5<HH>    Ca    7.1<L>      19 Mar 2021 05:42  Phos  5.8     03-19  Mg     1.8     03-19    TPro  6.6  /  Alb  3.9  /  TBili  0.5  /  DBili      /  AST  20  /  ALT  26  /  AlkPhos  107  03-19    eGFR if Non African American: 12 mL/min/1.73M2 (03-19-21 @ 05:42)  eGFR if : 14 mL/min/1.73M2 (03-19-21 @ 05:42)    Creatinine Trend:   Creatinine, Serum: 4.5 mg/dL (03-19-21 @ 05:42)  Creatinine, Serum: 4.9 mg/dL (03-18-21 @ 19:10)                            9.2    3.57  )-----------( 185      ( 19 Mar 2021 05:42 )             27.7     Mean Cell Volume: 92.0 fL (03-19-21 @ 05:42)    Urine Studies:            RADIOLOGY & ADDITIONAL STUDIES:  < from: Xray Chest 1 View AP/PA (03.19.21 @ 09:33) >    EXAM:  XR CHEST 1 VIEW            PROCEDURE DATE:  03/19/2021            INTERPRETATION:  Clinical History/Reason for Exam:  surgery clearance    Comparison: XR CHEST 3/18/2021.      Findings:    Technique/Positioning:  Frontal radiograph of the chest.    Support devices:  none    Cardiac/mediastinum/hilum: Stable cardiac silhouette post sternotomy    Lung parenchyma/ Pleura: No focal parenchymal opacities, effusion or pneumothorax is present.      Skeleton/soft tissues: No focal skeletal lesions are identified.      Impression:    No consolidation, effusion or pneumothorax..                          MILTON VICK MD; Attending Radiologist  This document has been electronically signed. Mar 19 2021  3:13PM    < end of copied text >      < from: Xray Femur 2 Views, Left (03.18.21 @ 19:47) >    EXAM:  XR FEMUR 2 VIEWS LT        EXAM:  XR PELVIS AP ONLY 1-2 VIEWS            PROCEDURE DATE:  03/18/2021            INTERPRETATION:  Clinical History / Reason for exam: Trauma    Technique: Single AP view of the pelvis and 4 views of the left femur    Comparison: None.    Findings/  impression:    Acute mildly displaced left femoral intertrochanteric fracture. Osteopenia. Vascular calcifications. Degenerative changes of the spine.              VIVIANA ROGERS MD; Attending Radiologist  This document has been electronically signed. Mar 18 2021  8:06PM    < end of copied text >

## 2021-03-19 NOTE — H&P ADULT - NSHPPOACENTRALVENOUSCATHETER_GEN_ALL_CORE
FINAL REPORT



EXAM:  US TRANSVAGINAL



HISTORY:  abdominal pain 



TECHNIQUE:  Transvesical and endovaginal pelvic sonographic

imaging was performed



FINDINGS:  





The uterus is normally anteverted. It measures 8.8 x 4.0 x 5.8

centimeters.



Endometrial stripe measures 2.6 millimeters which is within

normal limits.



Right ovary is not identified. By clinical history it is

surgically absent.



Left ovary measures 4.3 x 4.2 x 3.6 centimeters with 2 cysts, 1

measuring 3 centimeters and 1 measuring 1.6 centimeters. The

cysts appear simple.



There is no free pelvic fluid or adnexal mass lesion.



IMPRESSION:  

Surgically absent right ovary.



Two left ovarian/adnexal cysts, measuring 3 centimeters and 1.6

centimeters. These cysts appear simple. If clinically indicated,

recommend off cycle follow-up imaging in 6 or 10 weeks.



No adnexal mass or free cul-de-sac fluid.



Normal endometrial stripe thickness. no

## 2021-03-19 NOTE — H&P ADULT - NSICDXPASTMEDICALHX_GEN_ALL_CORE_FT
PAST MEDICAL HISTORY:  CAD (coronary artery disease)     CKD (chronic kidney disease)     DM (diabetes mellitus)     HLD (hyperlipidemia)     HTN (hypertension)

## 2021-03-19 NOTE — CONSULT NOTE ADULT - ASSESSMENT
75M with PMH of CKD 5 (follows with Dr. Driscoll at [facility]), CAD s/p PCI/CABG, DM2, HLD, prostate cancer s/p prostatectomy, admitted for L intertrochanteric femoral fx d/t mechanical fall, currently pending OR.    # CKD V, likely due to DM/HTN  # CKD-MBD, hyperphos/hypocalcemia  # Low anion gap metabolic acidosis d/t renal failure  # Normocytic anemia due to advanced CKD  # L intertrochanteric hip fx  - does not have access for RRT  - non oliguric  - euvolemic on exam, no s/s of uremia, K 3.9  - cont sodium bicarb po 650mg q8h  - start phoslo 1 tab qac when not npo instead of renvela, check intact PTH level, start calcitriol 0.25mcg daily  - hold lasix, monitor volume status closely, d/c iv fluids this evening/post OR unless pt hypotensive   - maintain on renal diet  - obtain UA  - document strict i/o and daily weights  - renal/bladder ultrasound if decreasing urine output or rising creatinine   - check iron stores  - avoid nephrotoxins  - appreciate ortho f/u 75M with PMH of CKD 5 (follows with Dr. Driscoll at [facility]), CAD s/p PCI/CABG, DM2, HLD, prostate cancer s/p prostatectomy, admitted for L intertrochanteric femoral fx d/t mechanical fall, currently pending OR.    # CKD V, likely due to DM/HTN  # CKD-MBD, hyperphos/hypocalcemia  # Low anion gap metabolic acidosis d/t renal failure  # Normocytic anemia due to advanced CKD  # L intertrochanteric hip fx  - does not have access for RRT  - non oliguric  - euvolemic on exam, no s/s of uremia, K 3.9  - cont sodium bicarb po 650mg q8h  - start phoslo 1 tab qac when not npo instead of renvela, check intact PTH level, start calcitriol 0.5mcg daily  - hold lasix, monitor volume status closely, d/c iv fluids this evening/post OR unless pt hypotensive   - maintain on renal diet  - obtain UA  - document strict i/o and daily weights  - renal/bladder ultrasound if decreasing urine output or rising creatinine   - check iron stores  - avoid nephrotoxins  - appreciate ortho f/u

## 2021-03-19 NOTE — CHART NOTE - NSCHARTNOTEFT_GEN_A_CORE
Seen by Dr Carcamo today. Case d/w RN and residents on rounds.    cc: fell on wet floor at home    hx: CKD 5; CAD s/p PCU and CABG; DM; DLD; edema    xray: lt hip fx    obtain CT femur per ortho for OR planning    cardio pre-op eval (check ECG and CXR)    Med eval in H&P (intermed risk)    need renal eval for Cr 4.9    pain is acceptable    DVT ppx: got hep sc x1, now on SCDs pre-op    NPO and IVFs for OR    r/o osteoporosis: outpt dexa; check TSH and Vit D 25 OH    will need PT post-op and likely SNF thereafter

## 2021-03-19 NOTE — CONSULT NOTE ADULT - SUBJECTIVE AND OBJECTIVE BOX
DATE OF ADMISSION: 03-18-21  HOSPITAL DAY: 1d    REASON FOR CONSULT:     HISTORY OF PRESENT ILLNESS:   HPI:  75 year old male has medical history of CKD V, CAD s/p PCI and CABG 15+ years ago, DM, Lower extremity edema and HLD presented with mechanical fall. Patient slipped on wet floor lost balance, denies hitting head, dizziness or loss of consciousness. On review of system patient denies Nausea, vomiting, fever, chills, headaches, SOB, weight loss, chest pain, abdominal pain, muscle weakness, lower extremity swelling, urinary or bowel habit changes. On physical, unable to lift left lower extremity due to pain, reports of having good sensation. Xray showed Left femoral fracture. Orthopedic team requesting medical and cardiac clearance for possible OR procedure tomorrow. (19 Mar 2021 02:38)        PAST MEDICAL & SURGICAL HISTORY:  HLD (hyperlipidemia)    HTN (hypertension)    CKD (chronic kidney disease)    CAD (coronary artery disease)    DM (diabetes mellitus)      FAMILY HISTORY:  [ X ] No pertinent family history of premature cardiovascular disease in first degree relatives  Mother:   Father:   Siblings:     SOCIAL HISTORY:  [  ] Non-smoker  [X  ] Smoker: 30 pack year history of smoking; quit in 200  [  ] Alcohol use:  [  ] Drug use:     ALLERGIES:  No Known Allergies    MEDICATIONS:  MEDICATIONS  (STANDING):  atorvastatin 20 milliGRAM(s) Oral at bedtime  chlorhexidine 4% Liquid 1 Application(s) Topical <User Schedule>  dextrose 40% Gel 15 Gram(s) Oral once  dextrose 5%. 1000 milliLiter(s) (50 mL/Hr) IV Continuous <Continuous>  dextrose 5%. 1000 milliLiter(s) (100 mL/Hr) IV Continuous <Continuous>  dextrose 50% Injectable 25 Gram(s) IV Push once  dextrose 50% Injectable 12.5 Gram(s) IV Push once  dextrose 50% Injectable 25 Gram(s) IV Push once  glucagon  Injectable 1 milliGRAM(s) IntraMuscular once  influenza   Vaccine 0.5 milliLiter(s) IntraMuscular once  insulin glargine Injectable (LANTUS) 10 Unit(s) SubCutaneous at bedtime  insulin lispro (ADMELOG) corrective regimen sliding scale   SubCutaneous three times a day before meals  insulin lispro Injectable (ADMELOG) 3 Unit(s) SubCutaneous three times a day before meals  metoprolol succinate  milliGRAM(s) Oral daily  polyethylene glycol 3350 17 Gram(s) Oral daily  sodium chloride 0.9%. 1000 milliLiter(s) (75 mL/Hr) IV Continuous <Continuous>    MEDICATIONS  (PRN):  oxyCODONE    IR 5 milliGRAM(s) Oral every 4 hours PRN Moderate Pain (4 - 6)    HOME MEDICATIONS:  Home Medications:  furosemide 80 mg oral tablet: 1 tab(s) orally once a day (18 Mar 2021 23:37)  Procrit:  (18 Mar 2021 23:39)  Renvela 800 mg oral tablet: 1 tab(s) orally 3 times a day (with meals) (18 Mar 2021 23:38)  sodium bicarbonate 650 mg oral tablet: 1 tab(s) orally 3 times a day (18 Mar 2021 23:38)  Toprol- mg oral tablet, extended release: 1 tab(s) orally once a day (18 Mar 2021 23:37)  Vitamin D2 50,000 intl units (1.25 mg) oral capsule (obsolete): 1 cap(s) orally once a day (18 Mar 2021 23:39)    REVIEW OF SYSTEMS:  CONSTITUTIONAL: No weakness, fevers, chills, or fatigue  HEENT: no visual changes, vertigo, throat pain, or hearing loss  RESPIRATORY: no shortness of breath, cough, or wheezing  CARDIOVASCULAR: no chest pain, palpitations, or syncopal episodes  GASTROINTESTINAL: no abdominal pain, nausea, vomiting, diarrhea, constipation, melena or hematochezia  NEUROLOGICAL: no numbness or weakness  ENDOCRINOLOGICAL: no recent change in diabetic medications  MUSCULOSKELETAL: Left Hip Pain   GENITOURINARY: no dysuria, frequency, or hematuria  SKIN: no itching, rashes, or bruising    VITALS:   T(C): 36.6 (03-19-21 @ 06:10), Max: 36.8 (03-19-21 @ 00:29)  HR: 66 (03-19-21 @ 06:10) (66 - 68)  BP: 154/69 (03-19-21 @ 06:10) (123/58 - 176/80)  RR: 18 (03-19-21 @ 06:10) (18 - 18)  SpO2: 98% (03-19-21 @ 06:10) (97% - 98%)  Wt(kg): --  I&O's Summary    PHYSICAL EXAM:  CONSTITUTIONAL: no acute distress, well appearing  NECK: no thyroid enlargement, no JVD, no carotid bruit  EYES: no xanthomalasia, EOM intact  LUNGS: Clear to auscultation bilaterally  CARDIOVASCULAR: regular rate and rhythm, audible S1/S2, no JVD, no murmurs, no edema  ABDOMEN: soft, non-tender, non-distended, bowel sounds present  EXTREMITIES: able to move all four extremities, no clubbing, cyanosis or edema  VASCULAR: 2+ palpable peripheral pulses bilaterally  NEUROLOGICAL: non-focal      LABS:                        9.2    3.57  )-----------( 185      ( 19 Mar 2021 05:42 )             27.7     03-19    135  |  96<L>  |  87<HH>  ----------------------------<  124<H>  3.9   |  23  |  4.5<HH>    Ca    7.1<L>      19 Mar 2021 05:42  Phos  5.8     03-19  Mg     1.8     03-19    TPro  6.6  /  Alb  3.9  /  TBili  0.5  /  DBili  x   /  AST  20  /  ALT  26  /  AlkPhos  107  03-19        PT/INR - ( 18 Mar 2021 19:10 )   PT: 12.70 sec;   INR: 1.10 ratio         PTT - ( 18 Mar 2021 19:10 )  PTT:34.2 sec          CARDIAC MARKERS:          TELEMETRY EVENTS:  ECG: Normal sinus rhythm  CHEST X-RAY: Unremakrable  OTHER:    PREVIOUS DIAGNOSTIC TESTING:  Patient has had no previous cardiac diagnostic test in the [facility] system   [  ] Echocardiogram:	  [  ] Catheterization:	  [  ] Stress Test:  		  [  ] Coronary CTA:

## 2021-03-19 NOTE — H&P ADULT - ATTENDING COMMENTS
74 YO M with a PMH of CKD5, CAD s/p PCI/CABG, DM2, and HLD who was BIBEMS for eval of inability to ambulate due to left hip pain s/p slip and fall this PM. Denies any LOC or head trauma. Remembers entire event. No preceding symptoms of CP, SOB, dizziness, focal weakness, or headaches. No seizure-like activity. In the ED, X-ray of hips showed left intertrochanteric femoral fracture. Ortho consulted and will take pt to OR tomorrow, asking for left fem CT, and pending risk stratification and medical optimization.     Physical exam shows pt in NAD. VSS, afebrile, not hypoxic on RA. A&Ox3. Head is atraumatic. Neuro shows a non-focal exam. Motor strength/sensation is intact. CTA B/L with no W/C/R. RRR, no M/G/R. ABD is soft and non-tender, normoactive BSs. Left LE with external rotation, pulses and sensation intact, and TTP over the hip. No rashes. Labs and radiology as above. QTc     Inability to ambulate due to left hip pain from left intertrochanteric femoral fracture s/p mechanical fall. Ortho is following. OR in the AM. NPO at MN. Pre-op labs, Chest X-ray, and EKG. Active T&S. PRN pain meds. Monitor compartments/pulses. PT consult afterwards. Fall precautions. DVT PPX recs as per Ortho.   Serious complication: 5.5%  Any Complication: 5.7%  -Pt is moderate-to-high risk for proposed surgical procedure. Please obtain Renal/Cardiac evals prior to OR. Needs better control of FSs, replacement of Ca2+, and anemia eval. Ortho is asking for femoral CT for surgical planning.     Diabetes mellitus with hyperglycemia. A1c. FSs. Insulin PRN.     Normocytic anemia, unknown baseline. Pt denies bleeding symptoms. Send anemia work-up. Replace as necessary.     Mild HAGMA (High AG Metabolic Acidosis) from uremic acidosis. Repeat BMP in the AM.     HX of CKD5, CAD s/p PCI/CABG, and HLD. Restart home meds, except as stated above. DVT PPX. Inform PCP of pt's admission to hospital. My note supersedes the residents note.

## 2021-03-19 NOTE — H&P ADULT - HISTORY OF PRESENT ILLNESS
75 year old male has medical history of CKD V, CAD s/p PCI and CABG 15+ years ago, DM, HLD presented with mechanical fall. Patient slipped on wet floor lost balance, denies hitting head, dizziness or loss of consciousness. On review of system patient denies Nausea, vomiting, fever, chills, headaches, SOB, weight loss, chest pain, abdominal pain, muscle weakness, lower extremity swelling, urinary or bowel habit changes. On physical, unable to lift left lower extremity due to pain, reports of having good sensation. Xray showed Left femoral fracture. Orthopedic team requesting medical and cardiac clearance for possible OR procedure tomorrow. 75 year old male has medical history of CKD V, CAD s/p PCI and CABG 15+ years ago, DM, Lower extremity edema and HLD presented with mechanical fall. Patient slipped on wet floor lost balance, denies hitting head, dizziness or loss of consciousness. On review of system patient denies Nausea, vomiting, fever, chills, headaches, SOB, weight loss, chest pain, abdominal pain, muscle weakness, lower extremity swelling, urinary or bowel habit changes. On physical, unable to lift left lower extremity due to pain, reports of having good sensation. Xray showed Left femoral fracture. Orthopedic team requesting medical and cardiac clearance for possible OR procedure tomorrow.

## 2021-03-20 LAB
ALBUMIN SERPL ELPH-MCNC: 3.6 G/DL — SIGNIFICANT CHANGE UP (ref 3.5–5.2)
ALP SERPL-CCNC: 114 U/L — SIGNIFICANT CHANGE UP (ref 30–115)
ALT FLD-CCNC: 22 U/L — SIGNIFICANT CHANGE UP (ref 0–41)
ANION GAP SERPL CALC-SCNC: 13 MMOL/L — SIGNIFICANT CHANGE UP (ref 7–14)
APTT BLD: 33 SEC — SIGNIFICANT CHANGE UP (ref 27–39.2)
AST SERPL-CCNC: 18 U/L — SIGNIFICANT CHANGE UP (ref 0–41)
BILIRUB SERPL-MCNC: 0.7 MG/DL — SIGNIFICANT CHANGE UP (ref 0.2–1.2)
BLD GP AB SCN SERPL QL: SIGNIFICANT CHANGE UP
BUN SERPL-MCNC: 74 MG/DL — CRITICAL HIGH (ref 10–20)
CALCIUM SERPL-MCNC: 7.2 MG/DL — LOW (ref 8.5–10.1)
CHLORIDE SERPL-SCNC: 105 MMOL/L — SIGNIFICANT CHANGE UP (ref 98–110)
CO2 SERPL-SCNC: 22 MMOL/L — SIGNIFICANT CHANGE UP (ref 17–32)
COVID-19 SPIKE DOMAIN AB INTERP: NEGATIVE — SIGNIFICANT CHANGE UP
COVID-19 SPIKE DOMAIN ANTIBODY RESULT: 0.4 U/ML — SIGNIFICANT CHANGE UP
CREAT SERPL-MCNC: 4.3 MG/DL — CRITICAL HIGH (ref 0.7–1.5)
GLUCOSE BLDC GLUCOMTR-MCNC: 254 MG/DL — HIGH (ref 70–99)
GLUCOSE BLDC GLUCOMTR-MCNC: 278 MG/DL — HIGH (ref 70–99)
GLUCOSE BLDC GLUCOMTR-MCNC: 87 MG/DL — SIGNIFICANT CHANGE UP (ref 70–99)
GLUCOSE BLDC GLUCOMTR-MCNC: 92 MG/DL — SIGNIFICANT CHANGE UP (ref 70–99)
GLUCOSE BLDC GLUCOMTR-MCNC: 98 MG/DL — SIGNIFICANT CHANGE UP (ref 70–99)
GLUCOSE SERPL-MCNC: 91 MG/DL — SIGNIFICANT CHANGE UP (ref 70–99)
HCT VFR BLD CALC: 28.4 % — LOW (ref 42–52)
HCT VFR BLD CALC: 28.7 % — LOW (ref 42–52)
HGB BLD-MCNC: 9.1 G/DL — LOW (ref 14–18)
HGB BLD-MCNC: 9.3 G/DL — LOW (ref 14–18)
INR BLD: 1.23 RATIO — SIGNIFICANT CHANGE UP (ref 0.65–1.3)
MCHC RBC-ENTMCNC: 30.2 PG — SIGNIFICANT CHANGE UP (ref 27–31)
MCHC RBC-ENTMCNC: 30.7 PG — SIGNIFICANT CHANGE UP (ref 27–31)
MCHC RBC-ENTMCNC: 31.7 G/DL — LOW (ref 32–37)
MCHC RBC-ENTMCNC: 32.7 G/DL — SIGNIFICANT CHANGE UP (ref 32–37)
MCV RBC AUTO: 93.7 FL — SIGNIFICANT CHANGE UP (ref 80–94)
MCV RBC AUTO: 95.3 FL — HIGH (ref 80–94)
NRBC # BLD: 0 /100 WBCS — SIGNIFICANT CHANGE UP (ref 0–0)
NRBC # BLD: 0 /100 WBCS — SIGNIFICANT CHANGE UP (ref 0–0)
PLATELET # BLD AUTO: 173 K/UL — SIGNIFICANT CHANGE UP (ref 130–400)
PLATELET # BLD AUTO: 205 K/UL — SIGNIFICANT CHANGE UP (ref 130–400)
POTASSIUM SERPL-MCNC: 4.3 MMOL/L — SIGNIFICANT CHANGE UP (ref 3.5–5)
POTASSIUM SERPL-SCNC: 4.3 MMOL/L — SIGNIFICANT CHANGE UP (ref 3.5–5)
PROT SERPL-MCNC: 6.3 G/DL — SIGNIFICANT CHANGE UP (ref 6–8)
PROTHROM AB SERPL-ACNC: 14.2 SEC — HIGH (ref 9.95–12.87)
RBC # BLD: 3.01 M/UL — LOW (ref 4.7–6.1)
RBC # BLD: 3.03 M/UL — LOW (ref 4.7–6.1)
RBC # FLD: 13.5 % — SIGNIFICANT CHANGE UP (ref 11.5–14.5)
RBC # FLD: 13.8 % — SIGNIFICANT CHANGE UP (ref 11.5–14.5)
SARS-COV-2 IGG+IGM SERPL QL IA: 0.4 U/ML — SIGNIFICANT CHANGE UP
SARS-COV-2 IGG+IGM SERPL QL IA: NEGATIVE — SIGNIFICANT CHANGE UP
SODIUM SERPL-SCNC: 140 MMOL/L — SIGNIFICANT CHANGE UP (ref 135–146)
TSH SERPL-MCNC: 2.03 UIU/ML — SIGNIFICANT CHANGE UP (ref 0.27–4.2)
WBC # BLD: 3.8 K/UL — LOW (ref 4.8–10.8)
WBC # BLD: 6.61 K/UL — SIGNIFICANT CHANGE UP (ref 4.8–10.8)
WBC # FLD AUTO: 3.8 K/UL — LOW (ref 4.8–10.8)
WBC # FLD AUTO: 6.61 K/UL — SIGNIFICANT CHANGE UP (ref 4.8–10.8)

## 2021-03-20 RX ORDER — OXYCODONE AND ACETAMINOPHEN 5; 325 MG/1; MG/1
1 TABLET ORAL ONCE
Refills: 0 | Status: DISCONTINUED | OUTPATIENT
Start: 2021-03-20 | End: 2021-03-20

## 2021-03-20 RX ORDER — CEFAZOLIN SODIUM 1 G
500 VIAL (EA) INJECTION EVERY 12 HOURS
Refills: 0 | Status: DISCONTINUED | OUTPATIENT
Start: 2021-03-20 | End: 2021-03-22

## 2021-03-20 RX ORDER — HYDROMORPHONE HYDROCHLORIDE 2 MG/ML
0.5 INJECTION INTRAMUSCULAR; INTRAVENOUS; SUBCUTANEOUS
Refills: 0 | Status: DISCONTINUED | OUTPATIENT
Start: 2021-03-20 | End: 2021-03-20

## 2021-03-20 RX ORDER — INSULIN LISPRO 100/ML
3 VIAL (ML) SUBCUTANEOUS
Refills: 0 | Status: DISCONTINUED | OUTPATIENT
Start: 2021-03-20 | End: 2021-03-24

## 2021-03-20 RX ORDER — DEXTROSE 50 % IN WATER 50 %
12.5 SYRINGE (ML) INTRAVENOUS ONCE
Refills: 0 | Status: DISCONTINUED | OUTPATIENT
Start: 2021-03-20 | End: 2021-03-24

## 2021-03-20 RX ORDER — ONDANSETRON 8 MG/1
4 TABLET, FILM COATED ORAL ONCE
Refills: 0 | Status: DISCONTINUED | OUTPATIENT
Start: 2021-03-20 | End: 2021-03-20

## 2021-03-20 RX ORDER — CEFAZOLIN SODIUM 1 G
2000 VIAL (EA) INJECTION EVERY 8 HOURS
Refills: 0 | Status: DISCONTINUED | OUTPATIENT
Start: 2021-03-20 | End: 2021-03-20

## 2021-03-20 RX ORDER — DEXTROSE 50 % IN WATER 50 %
25 SYRINGE (ML) INTRAVENOUS ONCE
Refills: 0 | Status: DISCONTINUED | OUTPATIENT
Start: 2021-03-20 | End: 2021-03-24

## 2021-03-20 RX ORDER — HEPARIN SODIUM 5000 [USP'U]/ML
5000 INJECTION INTRAVENOUS; SUBCUTANEOUS EVERY 8 HOURS
Refills: 0 | Status: DISCONTINUED | OUTPATIENT
Start: 2021-03-20 | End: 2021-03-24

## 2021-03-20 RX ORDER — CHOLECALCIFEROL (VITAMIN D3) 125 MCG
1000 CAPSULE ORAL DAILY
Refills: 0 | Status: DISCONTINUED | OUTPATIENT
Start: 2021-03-20 | End: 2021-03-22

## 2021-03-20 RX ORDER — HYDROMORPHONE HYDROCHLORIDE 2 MG/ML
1 INJECTION INTRAMUSCULAR; INTRAVENOUS; SUBCUTANEOUS
Refills: 0 | Status: DISCONTINUED | OUTPATIENT
Start: 2021-03-20 | End: 2021-03-20

## 2021-03-20 RX ORDER — INSULIN LISPRO 100/ML
VIAL (ML) SUBCUTANEOUS
Refills: 0 | Status: DISCONTINUED | OUTPATIENT
Start: 2021-03-20 | End: 2021-03-24

## 2021-03-20 RX ORDER — ACETAMINOPHEN 500 MG
650 TABLET ORAL EVERY 6 HOURS
Refills: 0 | Status: DISCONTINUED | OUTPATIENT
Start: 2021-03-20 | End: 2021-03-24

## 2021-03-20 RX ORDER — INSULIN GLARGINE 100 [IU]/ML
10 INJECTION, SOLUTION SUBCUTANEOUS AT BEDTIME
Refills: 0 | Status: DISCONTINUED | OUTPATIENT
Start: 2021-03-20 | End: 2021-03-24

## 2021-03-20 RX ORDER — DEXTROSE 50 % IN WATER 50 %
15 SYRINGE (ML) INTRAVENOUS ONCE
Refills: 0 | Status: DISCONTINUED | OUTPATIENT
Start: 2021-03-20 | End: 2021-03-24

## 2021-03-20 RX ADMIN — Medication 3 UNIT(S): at 17:23

## 2021-03-20 RX ADMIN — Medication 100 MILLIGRAM(S): at 06:34

## 2021-03-20 RX ADMIN — INSULIN GLARGINE 10 UNIT(S): 100 INJECTION, SOLUTION SUBCUTANEOUS at 23:05

## 2021-03-20 RX ADMIN — HYDROMORPHONE HYDROCHLORIDE 0.5 MILLIGRAM(S): 2 INJECTION INTRAMUSCULAR; INTRAVENOUS; SUBCUTANEOUS at 12:10

## 2021-03-20 RX ADMIN — Medication 100 MILLIGRAM(S): at 17:23

## 2021-03-20 RX ADMIN — Medication 650 MILLIGRAM(S): at 23:10

## 2021-03-20 RX ADMIN — HEPARIN SODIUM 5000 UNIT(S): 5000 INJECTION INTRAVENOUS; SUBCUTANEOUS at 23:05

## 2021-03-20 RX ADMIN — Medication 6: at 17:23

## 2021-03-20 RX ADMIN — HEPARIN SODIUM 5000 UNIT(S): 5000 INJECTION INTRAVENOUS; SUBCUTANEOUS at 12:57

## 2021-03-20 RX ADMIN — HYDROMORPHONE HYDROCHLORIDE 0.5 MILLIGRAM(S): 2 INJECTION INTRAMUSCULAR; INTRAVENOUS; SUBCUTANEOUS at 12:59

## 2021-03-20 RX ADMIN — CHLORHEXIDINE GLUCONATE 1 APPLICATION(S): 213 SOLUTION TOPICAL at 06:52

## 2021-03-20 NOTE — PROGRESS NOTE ADULT - SUBJECTIVE AND OBJECTIVE BOX
seen and examined   no distress   lying comfortable         PAST HISTORY  --------------------------------------------------------------------------------  No significant changes to PMH, PSH, FHx, SHx, unless otherwise noted    ALLERGIES & MEDICATIONS  --------------------------------------------------------------------------------  Allergies    No Known Allergies    Intolerances      Standing Inpatient Medications  atorvastatin 20 milliGRAM(s) Oral at bedtime  calcitriol   Capsule 0.5 MICROGram(s) Oral daily  calcium acetate 667 milliGRAM(s) Oral three times a day with meals  chlorhexidine 4% Liquid 1 Application(s) Topical <User Schedule>  dextrose 40% Gel 15 Gram(s) Oral once  dextrose 5%. 1000 milliLiter(s) IV Continuous <Continuous>  dextrose 5%. 1000 milliLiter(s) IV Continuous <Continuous>  dextrose 50% Injectable 25 Gram(s) IV Push once  dextrose 50% Injectable 12.5 Gram(s) IV Push once  dextrose 50% Injectable 25 Gram(s) IV Push once  glucagon  Injectable 1 milliGRAM(s) IntraMuscular once  influenza   Vaccine 0.5 milliLiter(s) IntraMuscular once  insulin glargine Injectable (LANTUS) 10 Unit(s) SubCutaneous at bedtime  insulin lispro (ADMELOG) corrective regimen sliding scale   SubCutaneous three times a day before meals  insulin lispro Injectable (ADMELOG) 3 Unit(s) SubCutaneous three times a day before meals  metoprolol succinate  milliGRAM(s) Oral daily  polyethylene glycol 3350 17 Gram(s) Oral daily  sodium chloride 0.9%. 1000 milliLiter(s) IV Continuous <Continuous>    PRN Inpatient Medications  oxyCODONE    IR 5 milliGRAM(s) Oral every 4 hours PRN      VITALS/PHYSICAL EXAM  --------------------------------------------------------------------------------  T(C): 36.7 (03-20-21 @ 05:38), Max: 36.7 (03-19-21 @ 21:07)  HR: 63 (03-20-21 @ 05:38) (61 - 63)  BP: 140/64 (03-20-21 @ 05:38) (138/63 - 140/64)  RR: 18 (03-20-21 @ 05:38) (17 - 18)  SpO2: 96% (03-20-21 @ 07:58) (94% - 96%)  Wt(kg): --  Height (cm): 170.2 (03-20-21 @ 05:38)  Weight (kg): 62.6 (03-19-21 @ 06:10)  BMI (kg/m2): 21.6 (03-20-21 @ 05:38)  BSA (m2): 1.73 (03-20-21 @ 05:38)      03-19-21 @ 07:01  -  03-20-21 @ 07:00  --------------------------------------------------------  IN: 0 mL / OUT: 1800 mL / NET: -1800 mL      Physical Exam:  	Gen: NAD  	Pulm: CTA B/L  	CV:  S1S2; no rub  	Abd: soft, nontender/nondistended        LABS/STUDIES  --------------------------------------------------------------------------------              9.4    3.50  >-----------<  179      [03-19-21 @ 21:30]              28.3     135  |  96  |  87  ----------------------------<  124      [03-19-21 @ 05:42]  3.9   |  23  |  4.5        Ca     7.1     [03-19-21 @ 05:42]      Mg     1.8     [03-19-21 @ 05:42]      Phos  5.8     [03-19-21 @ 05:42]    TPro  6.6  /  Alb  3.9  /  TBili  0.5  /  DBili  x   /  AST  20  /  ALT  26  /  AlkPhos  107  [03-19-21 @ 05:42]    PT/INR: PT 12.70, INR 1.10       [03-18-21 @ 19:10]  PTT: 34.2       [03-18-21 @ 19:10]      Creatinine Trend:  SCr 4.5 [03-19 @ 05:42]  SCr 4.9 [03-18 @ 19:10]        Vitamin D (25OH) 14      [03-19-21 @ 09:10]  Lipid: chol 150, TG 74, HDL 37, LDL --      [03-19-21 @ 05:42]

## 2021-03-20 NOTE — PROGRESS NOTE ADULT - ASSESSMENT
ORTHOPEDIC SURGERY POC    75y Male /sp L hip ORIF. Resting comfortably in bed, no complaints.       Denies numbness/tingling.  Denies f/c/n/v/cp/sob.    Medications:  ceFAZolin   IVPB 500 milliGRAM(s) IV Intermittent every 12 hours  dextrose 40% Gel 15 Gram(s) Oral once  dextrose 50% Injectable 25 Gram(s) IV Push once  dextrose 50% Injectable 12.5 Gram(s) IV Push once  dextrose 50% Injectable 25 Gram(s) IV Push once  heparin SubCutaneous Injection - Peds 5000 Unit(s) SubCutaneous every 8 hours  influenza   Vaccine 0.5 milliLiter(s) IntraMuscular once  insulin glargine Injectable (LANTUS) 10 Unit(s) SubCutaneous at bedtime  insulin lispro (ADMELOG) corrective regimen sliding scale   SubCutaneous three times a day before meals  insulin lispro Injectable (ADMELOG) 3 Unit(s) SubCutaneous three times a day before meals    No Known Allergies      PMH/PSH:  CHF (congestive heart failure)    HLD (hyperlipidemia)    HTN (hypertension)    CKD (chronic kidney disease)    CAD (coronary artery disease)    DM (diabetes mellitus)    No significant past surgical history        Exam:  T(C): 36.3 (03-20-21 @ 12:48), Max: 36.7 (03-20-21 @ 05:38)  HR: 62 (03-20-21 @ 13:40) (60 - 73)  BP: 122/57 (03-20-21 @ 13:40) (122/57 - 163/74)  RR: 16 (03-20-21 @ 13:40) (14 - 18)  SpO2: 97% (03-20-21 @ 12:48) (94% - 100%)  General: Awake, alert, NAD, AAOx3    LLE: Dressing c/d/i SILT s/s/sp/dp/t.  Motor intact EHL, TA, Gastroc.  No ecchymosis or gross deformity.  Palpable DP, PT pulses      Labs:                        9.1    6.61  )-----------( 205      ( 20 Mar 2021 17:57 )             28.7     03-20    140  |  105  |  74<HH>  ----------------------------<  91  4.3   |  22  |  4.3<HH>    Ca    7.2<L>      20 Mar 2021 05:38  Phos  5.8     03-19  Mg     1.8     03-19    TPro  6.3  /  Alb  3.6  /  TBili  0.7  /  DBili  x   /  AST  18  /  ALT  22  /  AlkPhos  114  03-20    PT/INR - ( 20 Mar 2021 08:10 )   PT: 14.20 sec;   INR: 1.23 ratio         PTT - ( 20 Mar 2021 08:10 )  PTT:33.0 sec    A/P:  75yMale s/p L hip IMN POD 0       - WBAT LLE  - DVT PPX  - Ancef 2g for q8 for 24h   - pt consult   - ortho to follow

## 2021-03-20 NOTE — PROGRESS NOTE ADULT - ASSESSMENT
75M with PMH of CKD 5 (follows with Dr. Driscoll at [facility]), CAD s/p PCI/CABG, DM2, HLD, prostate cancer s/p prostatectomy, admitted for L intertrochanteric femoral fx d/t mechanical fall    # CKD V, likely due to DM/HTN  # CKD-MBD, hyperphos/hypocalcemia  # Low anion gap metabolic acidosis d/t renal failure  # Normocytic anemia due to advanced CKD  # L intertrochanteric hip fx  - does not have access for RRT  - non oliguric  - no iv fluids   -  on phoslo 1 tab qac check intact PTH level,  continue calcitriol 0.5mcg daily  - maintain on renal diet  - obtain UA  - document strict i/o and daily weights  - renal/bladder ultrasound if decreasing urine output or rising creatinine   - check iron stores  - avoid nephrotoxins  - appreciate ortho f/u  - no acute indication for RRT   -  will follow

## 2021-03-20 NOTE — CHART NOTE - NSCHARTNOTEFT_GEN_A_CORE
Anesthesia Post Op Assessment  		(    ) Intubated           TV _____	Rate __sv___	[id number]____  		(  x  ) Patent airway. Full return of protective reflexes  		(  x  )Full recovery from anesthesia/sedation to baseline status      Cardiovascular Function:  		BP:	162/74	                  Pulse:		78                  RR:		12                  Temp:		98                  O2Sat:   99      Mental Status:  	        (   x ) awake		  (  x  ) alert		 (    ) drowsy	               (    ) sedated      Nausea/Vomiting:  		(    ) Yes, See post-op orders		   ( x   ) No      Pain Scale: (0-10):			Treatment:     (    ) None	            (  x  ) See Post-Op/PCA Orders      Post-operative Fluids: 	   (    ) Oral	          (  x  ) See post-op Orders        Comments:    Uneventful. No complications from anesthesia.  Discharge when criteria met.

## 2021-03-21 LAB
ALBUMIN SERPL ELPH-MCNC: 3.2 G/DL — LOW (ref 3.5–5.2)
ALP SERPL-CCNC: 96 U/L — SIGNIFICANT CHANGE UP (ref 30–115)
ALT FLD-CCNC: 11 U/L — SIGNIFICANT CHANGE UP (ref 0–41)
ANION GAP SERPL CALC-SCNC: 13 MMOL/L — SIGNIFICANT CHANGE UP (ref 7–14)
ANION GAP SERPL CALC-SCNC: 14 MMOL/L — SIGNIFICANT CHANGE UP (ref 7–14)
AST SERPL-CCNC: 16 U/L — SIGNIFICANT CHANGE UP (ref 0–41)
BILIRUB SERPL-MCNC: 0.6 MG/DL — SIGNIFICANT CHANGE UP (ref 0.2–1.2)
BUN SERPL-MCNC: 68 MG/DL — CRITICAL HIGH (ref 10–20)
BUN SERPL-MCNC: 70 MG/DL — CRITICAL HIGH (ref 10–20)
CALCIUM SERPL-MCNC: 7.3 MG/DL — LOW (ref 8.5–10.1)
CALCIUM SERPL-MCNC: 7.9 MG/DL — LOW (ref 8.5–10.1)
CHLORIDE SERPL-SCNC: 101 MMOL/L — SIGNIFICANT CHANGE UP (ref 98–110)
CHLORIDE SERPL-SCNC: 102 MMOL/L — SIGNIFICANT CHANGE UP (ref 98–110)
CO2 SERPL-SCNC: 20 MMOL/L — SIGNIFICANT CHANGE UP (ref 17–32)
CO2 SERPL-SCNC: 21 MMOL/L — SIGNIFICANT CHANGE UP (ref 17–32)
CREAT SERPL-MCNC: 4.7 MG/DL — CRITICAL HIGH (ref 0.7–1.5)
CREAT SERPL-MCNC: 4.8 MG/DL — CRITICAL HIGH (ref 0.7–1.5)
GLUCOSE BLDC GLUCOMTR-MCNC: 126 MG/DL — HIGH (ref 70–99)
GLUCOSE BLDC GLUCOMTR-MCNC: 159 MG/DL — HIGH (ref 70–99)
GLUCOSE BLDC GLUCOMTR-MCNC: 203 MG/DL — HIGH (ref 70–99)
GLUCOSE BLDC GLUCOMTR-MCNC: 282 MG/DL — HIGH (ref 70–99)
GLUCOSE SERPL-MCNC: 139 MG/DL — HIGH (ref 70–99)
GLUCOSE SERPL-MCNC: 206 MG/DL — HIGH (ref 70–99)
HCT VFR BLD CALC: 23.8 % — LOW (ref 42–52)
HCT VFR BLD CALC: 25.1 % — LOW (ref 42–52)
HGB BLD-MCNC: 7.6 G/DL — LOW (ref 14–18)
HGB BLD-MCNC: 7.9 G/DL — LOW (ref 14–18)
MAGNESIUM SERPL-MCNC: 1.6 MG/DL — LOW (ref 1.8–2.4)
MCHC RBC-ENTMCNC: 29.8 PG — SIGNIFICANT CHANGE UP (ref 27–31)
MCHC RBC-ENTMCNC: 30.3 PG — SIGNIFICANT CHANGE UP (ref 27–31)
MCHC RBC-ENTMCNC: 31.5 G/DL — LOW (ref 32–37)
MCHC RBC-ENTMCNC: 31.9 G/DL — LOW (ref 32–37)
MCV RBC AUTO: 94.7 FL — HIGH (ref 80–94)
MCV RBC AUTO: 94.8 FL — HIGH (ref 80–94)
NRBC # BLD: 0 /100 WBCS — SIGNIFICANT CHANGE UP (ref 0–0)
NRBC # BLD: 0 /100 WBCS — SIGNIFICANT CHANGE UP (ref 0–0)
PLATELET # BLD AUTO: 178 K/UL — SIGNIFICANT CHANGE UP (ref 130–400)
PLATELET # BLD AUTO: 178 K/UL — SIGNIFICANT CHANGE UP (ref 130–400)
POTASSIUM SERPL-MCNC: 4.7 MMOL/L — SIGNIFICANT CHANGE UP (ref 3.5–5)
POTASSIUM SERPL-MCNC: 4.9 MMOL/L — SIGNIFICANT CHANGE UP (ref 3.5–5)
POTASSIUM SERPL-SCNC: 4.7 MMOL/L — SIGNIFICANT CHANGE UP (ref 3.5–5)
POTASSIUM SERPL-SCNC: 4.9 MMOL/L — SIGNIFICANT CHANGE UP (ref 3.5–5)
PROT SERPL-MCNC: 5.9 G/DL — LOW (ref 6–8)
RBC # BLD: 2.51 M/UL — LOW (ref 4.7–6.1)
RBC # BLD: 2.65 M/UL — LOW (ref 4.7–6.1)
RBC # FLD: 13.4 % — SIGNIFICANT CHANGE UP (ref 11.5–14.5)
RBC # FLD: 13.5 % — SIGNIFICANT CHANGE UP (ref 11.5–14.5)
SODIUM SERPL-SCNC: 135 MMOL/L — SIGNIFICANT CHANGE UP (ref 135–146)
SODIUM SERPL-SCNC: 136 MMOL/L — SIGNIFICANT CHANGE UP (ref 135–146)
WBC # BLD: 5.12 K/UL — SIGNIFICANT CHANGE UP (ref 4.8–10.8)
WBC # BLD: 7 K/UL — SIGNIFICANT CHANGE UP (ref 4.8–10.8)
WBC # FLD AUTO: 5.12 K/UL — SIGNIFICANT CHANGE UP (ref 4.8–10.8)
WBC # FLD AUTO: 7 K/UL — SIGNIFICANT CHANGE UP (ref 4.8–10.8)

## 2021-03-21 PROCEDURE — 99233 SBSQ HOSP IP/OBS HIGH 50: CPT

## 2021-03-21 RX ORDER — MAGNESIUM SULFATE 500 MG/ML
2 VIAL (ML) INJECTION ONCE
Refills: 0 | Status: COMPLETED | OUTPATIENT
Start: 2021-03-21 | End: 2021-03-21

## 2021-03-21 RX ORDER — SEVELAMER CARBONATE 2400 MG/1
800 POWDER, FOR SUSPENSION ORAL
Refills: 0 | Status: DISCONTINUED | OUTPATIENT
Start: 2021-03-21 | End: 2021-03-22

## 2021-03-21 RX ORDER — ATORVASTATIN CALCIUM 80 MG/1
10 TABLET, FILM COATED ORAL AT BEDTIME
Refills: 0 | Status: DISCONTINUED | OUTPATIENT
Start: 2021-03-21 | End: 2021-03-24

## 2021-03-21 RX ORDER — MORPHINE SULFATE 50 MG/1
2 CAPSULE, EXTENDED RELEASE ORAL ONCE
Refills: 0 | Status: DISCONTINUED | OUTPATIENT
Start: 2021-03-21 | End: 2021-03-21

## 2021-03-21 RX ORDER — LANOLIN ALCOHOL/MO/W.PET/CERES
5 CREAM (GRAM) TOPICAL AT BEDTIME
Refills: 0 | Status: DISCONTINUED | OUTPATIENT
Start: 2021-03-21 | End: 2021-03-24

## 2021-03-21 RX ORDER — METOPROLOL TARTRATE 50 MG
100 TABLET ORAL DAILY
Refills: 0 | Status: DISCONTINUED | OUTPATIENT
Start: 2021-03-21 | End: 2021-03-24

## 2021-03-21 RX ADMIN — Medication 650 MILLIGRAM(S): at 06:00

## 2021-03-21 RX ADMIN — HEPARIN SODIUM 5000 UNIT(S): 5000 INJECTION INTRAVENOUS; SUBCUTANEOUS at 05:29

## 2021-03-21 RX ADMIN — Medication 650 MILLIGRAM(S): at 00:00

## 2021-03-21 RX ADMIN — Medication 100 MILLIGRAM(S): at 17:29

## 2021-03-21 RX ADMIN — Medication 650 MILLIGRAM(S): at 23:59

## 2021-03-21 RX ADMIN — HEPARIN SODIUM 5000 UNIT(S): 5000 INJECTION INTRAVENOUS; SUBCUTANEOUS at 11:34

## 2021-03-21 RX ADMIN — Medication 3 UNIT(S): at 17:29

## 2021-03-21 RX ADMIN — Medication 2: at 17:29

## 2021-03-21 RX ADMIN — Medication 3 UNIT(S): at 07:36

## 2021-03-21 RX ADMIN — Medication 4: at 07:35

## 2021-03-21 RX ADMIN — Medication 6: at 23:22

## 2021-03-21 RX ADMIN — ATORVASTATIN CALCIUM 10 MILLIGRAM(S): 80 TABLET, FILM COATED ORAL at 23:22

## 2021-03-21 RX ADMIN — Medication 3 UNIT(S): at 11:34

## 2021-03-21 RX ADMIN — Medication 650 MILLIGRAM(S): at 23:21

## 2021-03-21 RX ADMIN — Medication 5 MILLIGRAM(S): at 01:36

## 2021-03-21 RX ADMIN — Medication 100 MILLIGRAM(S): at 05:29

## 2021-03-21 RX ADMIN — INSULIN GLARGINE 10 UNIT(S): 100 INJECTION, SOLUTION SUBCUTANEOUS at 23:22

## 2021-03-21 RX ADMIN — SEVELAMER CARBONATE 800 MILLIGRAM(S): 2400 POWDER, FOR SUSPENSION ORAL at 17:29

## 2021-03-21 RX ADMIN — Medication 1000 UNIT(S): at 11:34

## 2021-03-21 RX ADMIN — HEPARIN SODIUM 5000 UNIT(S): 5000 INJECTION INTRAVENOUS; SUBCUTANEOUS at 23:21

## 2021-03-21 RX ADMIN — Medication 25 GRAM(S): at 10:05

## 2021-03-21 RX ADMIN — Medication 650 MILLIGRAM(S): at 05:29

## 2021-03-21 RX ADMIN — Medication 5 MILLIGRAM(S): at 23:22

## 2021-03-21 NOTE — PHYSICAL THERAPY INITIAL EVALUATION ADULT - RANGE OF MOTION EXAMINATION, REHAB EVAL
L Hip ~1/4 AROM/bilateral upper extremity ROM was WNL (within normal limits)/Right LE ROM was WFL (within functional limits)/deficits as listed below

## 2021-03-21 NOTE — PROGRESS NOTE ADULT - SUBJECTIVE AND OBJECTIVE BOX
WILBERT JULIAN 75y Male  MRN#: [id number]   CODE STATUS:________    SUBJECTIVE  Patient is a 75y old Male who presents with a chief complaint of Left femoral fracture secondary to mechanical fall (20 Mar 2021 21:18)  Currently admitted to medicine with the primary diagnosis of Closed nondisplaced intertrochanteric fracture of left femur, initial encounter    . Today is hospital day 3d, and this morning he is resting comfortably and reports no overnight events.       OBJECTIVE  PAST MEDICAL & SURGICAL HISTORY  CHF (congestive heart failure)    HLD (hyperlipidemia)    HTN (hypertension)    CKD (chronic kidney disease)    CAD (coronary artery disease)    DM (diabetes mellitus)      ALLERGIES:  No Known Allergies    MEDICATIONS:  STANDING MEDICATIONS  ceFAZolin   IVPB 500 milliGRAM(s) IV Intermittent every 12 hours  cholecalciferol 1000 Unit(s) Oral daily  dextrose 40% Gel 15 Gram(s) Oral once  dextrose 50% Injectable 25 Gram(s) IV Push once  dextrose 50% Injectable 12.5 Gram(s) IV Push once  dextrose 50% Injectable 25 Gram(s) IV Push once  heparin SubCutaneous Injection - Peds 5000 Unit(s) SubCutaneous every 8 hours  influenza   Vaccine 0.5 milliLiter(s) IntraMuscular once  insulin glargine Injectable (LANTUS) 10 Unit(s) SubCutaneous at bedtime  insulin lispro (ADMELOG) corrective regimen sliding scale   SubCutaneous three times a day before meals  insulin lispro Injectable (ADMELOG) 3 Unit(s) SubCutaneous three times a day before meals  melatonin 5 milliGRAM(s) Oral at bedtime    PRN MEDICATIONS  acetaminophen   Tablet .. 650 milliGRAM(s) Oral every 6 hours PRN      VITAL SIGNS: Last 24 Hours  T(C): 37.8 (21 Mar 2021 05:49), Max: 38.1 (20 Mar 2021 21:00)  T(F): 100 (21 Mar 2021 05:49), Max: 100.6 (20 Mar 2021 21:00)  HR: 73 (21 Mar 2021 05:49) (60 - 79)  BP: 116/56 (21 Mar 2021 05:49) (114/62 - 163/74)  BP(mean): --  RR: 17 (21 Mar 2021 05:49) (14 - 18)  SpO2: 97% (20 Mar 2021 12:48) (96% - 100%)    LABS:                        9.1    6.61  )-----------( 205      ( 20 Mar 2021 17:57 )             28.7     03-20    140  |  105  |  74<HH>  ----------------------------<  91  4.3   |  22  |  4.3<HH>    Ca    7.2<L>      20 Mar 2021 05:38    TPro  6.3  /  Alb  3.6  /  TBili  0.7  /  DBili  x   /  AST  18  /  ALT  22  /  AlkPhos  114  03-20    PT/INR - ( 20 Mar 2021 08:10 )   PT: 14.20 sec;   INR: 1.23 ratio         PTT - ( 20 Mar 2021 08:10 )  PTT:33.0 sec              RADIOLOGY:    PHYSICAL EXAM:    GENERAL: NAD, alert  HEENT:  Atraumatic, Normocephalic. EOMI,  PULMONARY: Clear to auscultation bilaterally; No wheeze  CARDIOVASCULAR: Regular rate and rhythm; No murmurs  GASTROINTESTINAL: Soft, Nontender, Nondistended; Bowel sounds present  MUSCULOSKELETAL:  No clubbing, cyanosis, or edema  NEUROLOGY: non-focal  SKIN: No rashes or lesions      ASSESSMENT & PLAN  75 year old male has medical history of CKD V, CAD s/p PCI and CABG 15+ years ago, DM, HLD admitted for left femur fracture secondary to mechanical fall due to slipping on wet floor    # S/p mechanical fall  # Left femur fracture  - Ortho consulted, Sx 3/20  - Patient is moderate to high risk, METS >4 ( hx of CAD, DM)  -C/w ancef 24 hrs  -PT eval for SNF  -Vit D low 14---started on supplement  -TSH wnl    # Lower extremity Edema  - Echo shows EF 20-25%, severe global hypokinesis  - patient on Lasix 80 mg    # CAD s/p PCI and CABG 15 + years  - patient not on aspirin  - c/w statin     # CKD V, Anemia of chronic  - renal following    # DM  - monitor fs  - c/w insulin regimen, adjust accordingly    #) MISC  Activity: IAT  DVT ppx: lovenox   GI ppx: no need  Diet: NPO  Code: Full

## 2021-03-21 NOTE — CONSULT NOTE ADULT - SUBJECTIVE AND OBJECTIVE BOX
HPI:  75 year old male has medical history of CKD V, CAD s/p PCI and CABG 15+ years ago, DM, Lower extremity edema and HLD presented with mechanical fall. Patient slipped on wet floor lost balance, denies hitting head, dizziness or loss of consciousness. On review of system patient denies Nausea, vomiting, fever, chills, headaches, SOB, weight loss, chest pain, abdominal pain, muscle weakness, lower extremity swelling, urinary or bowel habit changes. On physical, unable to lift left lower extremity due to pain, reports of having good sensation. Xray showed Left femoral fracture. Orthopedic team requesting medical and cardiac clearance for possible OR procedure tomorrow. (19 Mar 2021 02:38)      PAST MEDICAL & SURGICAL HISTORY:  CHF (congestive heart failure)    HLD (hyperlipidemia)    HTN (hypertension)    CKD (chronic kidney disease)    CAD (coronary artery disease)    DM (diabetes mellitus)        Hospital Course:    TODAY'S SUBJECTIVE & REVIEW OF systems  no CP, no SOB see HPI other ROS unchanged    MEDICATIONS  (STANDING):  atorvastatin 10 milliGRAM(s) Oral at bedtime  ceFAZolin   IVPB 500 milliGRAM(s) IV Intermittent every 12 hours  cholecalciferol 1000 Unit(s) Oral daily  dextrose 40% Gel 15 Gram(s) Oral once  dextrose 50% Injectable 25 Gram(s) IV Push once  dextrose 50% Injectable 12.5 Gram(s) IV Push once  dextrose 50% Injectable 25 Gram(s) IV Push once  heparin SubCutaneous Injection - Peds 5000 Unit(s) SubCutaneous every 8 hours  influenza   Vaccine 0.5 milliLiter(s) IntraMuscular once  insulin glargine Injectable (LANTUS) 10 Unit(s) SubCutaneous at bedtime  insulin lispro (ADMELOG) corrective regimen sliding scale   SubCutaneous three times a day before meals  insulin lispro Injectable (ADMELOG) 3 Unit(s) SubCutaneous three times a day before meals  melatonin 5 milliGRAM(s) Oral at bedtime  metoprolol succinate  milliGRAM(s) Oral daily  sevelamer carbonate 800 milliGRAM(s) Oral three times a day with meals    MEDICATIONS  (PRN):  acetaminophen   Tablet .. 650 milliGRAM(s) Oral every 6 hours PRN Temp greater or equal to 38C (100.4F)      FAMILY HISTORY:      Allergies    No Known Allergies    Intolerances        SOCIAL HISTORY:    [  ] Etoh  [  ] Smoking  [  ] Substance abuse     Home Environment:  [  ] Home Alone  [  ] Lives with Family  [  ] Home Health Aid    Dwelling:  [  ] Apartment  [  ] Private House  [  ] Adult Home  [  ] Skilled Nursing Facility      [  ] Short Term  [  ] Long Term  [  ] Stairs       Elevator [  ]    FUNCTIONAL STATUS PTA: (Check all that apply)  Ambulation: [   ]Independent   [   ] Requires Assistance   [  ] Dependent     [  ] Non-Ambulatory       Assistive Device: [  ] SA Cane  [  ]  Q Cane  [  ] Walker  [  ]  Wheelchair  ADL : [  ] Independent    [   ] Requires Assistance    [  ]  Dependent       Vital Signs Last 24 Hrs  T(C): 35.6 (21 Mar 2021 13:00), Max: 38.1 (20 Mar 2021 21:00)  T(F): 96 (21 Mar 2021 13:00), Max: 100.6 (20 Mar 2021 21:00)  HR: 72 (21 Mar 2021 13:00) (72 - 79)  BP: 128/58 (21 Mar 2021 13:00) (114/62 - 128/58)  BP(mean): --  RR: 18 (21 Mar 2021 13:00) (17 - 18)  SpO2: 94% (21 Mar 2021 07:31) (94% - 94%)      PHYSICAL EXAM: Alert & Oriented X3  GENERAL: NAD, well-groomed, well-developed  HEAD:  Atraumatic, Normocephalic  EYES: EOMI, PERRL  NECK: Supple  CHEST/LUNG: BL chest expansion  HEART: S1S2  ABDOMEN: Soft, Nontender,   EXTREMITIES:  No clubbing, cyanosis, or edema  ROM:  [   ] WFL all extremities  [  ] Abnormal   (  ) franco  NERVOUS SYSTEM:   Cranial Nerves 2-12: [   ] intact  [  ] Abnormal   Motor Strength: [   ] WFL all extremities   [  ] Abnormal   Sensation: [   ] intact to light touch  [  ]    Reflexes: [   ] Symmetric  [  ]  Abnormal     FUNCTIONAL STATUS: see therapy        LABS:                        7.6    7.00  )-----------( 178      ( 21 Mar 2021 07:33 )             23.8     03-21    135  |  102  |  70<HH>  ----------------------------<  206<H>  4.9   |  20  |  4.8<HH>    Ca    7.3<L>      21 Mar 2021 07:33  Mg     1.6     03-21    TPro  5.9<L>  /  Alb  3.2<L>  /  TBili  0.6  /  DBili  x   /  AST  16  /  ALT  11  /  AlkPhos  96  03-21    PT/INR - ( 20 Mar 2021 08:10 )   PT: 14.20 sec;   INR: 1.23 ratio         PTT - ( 20 Mar 2021 08:10 )  PTT:33.0 sec      RADIOLOGY     < from: Xray Femur 2 Views, Left (03.18.21 @ 19:47) >  EXAM:  XR FEMUR 2 VIEWS LT        EXAM:  XR PELVIS AP ONLY 1-2 VIEWS            PROCEDURE DATE:  03/18/2021            INTERPRETATION:  Clinical History / Reason for exam: Trauma    Technique: Single AP view of the pelvis and 4 views of the left femur    Comparison: None.    Findings/  impression:    Acute mildly displaced left femoral intertrochanteric fracture. Osteopenia. Vascular calcifications. Degenerative changes of the spine.        < end of copied text >           HPI:  75 year old male has medical history of CKD V, CAD s/p PCI and CABG 15+ years ago, DM, Lower extremity edema and HLD presented with mechanical fall. Patient slipped on wet floor lost balance, denies hitting head, dizziness or loss of consciousness. On review of system patient denies Nausea, vomiting, fever, chills, headaches, SOB, weight loss, chest pain, abdominal pain, muscle weakness, lower extremity swelling, urinary or bowel habit changes. On physical, unable to lift left lower extremity due to pain, reports of having good sensation. Xray showed Left femoral fracture. Orthopedic team requesting medical and cardiac clearance for possible OR procedure tomorrow. (19 Mar 2021 02:38)      PAST MEDICAL & SURGICAL HISTORY:  CHF (congestive heart failure)    HLD (hyperlipidemia)    HTN (hypertension)    CKD (chronic kidney disease)    CAD (coronary artery disease)    DM (diabetes mellitus)        Hospital Course: for hip fx sx    TODAY'S SUBJECTIVE & REVIEW OF systems  no CP, no SOB see HPI other ROS unchanged    MEDICATIONS  (STANDING):  atorvastatin 10 milliGRAM(s) Oral at bedtime  ceFAZolin   IVPB 500 milliGRAM(s) IV Intermittent every 12 hours  cholecalciferol 1000 Unit(s) Oral daily  dextrose 40% Gel 15 Gram(s) Oral once  dextrose 50% Injectable 25 Gram(s) IV Push once  dextrose 50% Injectable 12.5 Gram(s) IV Push once  dextrose 50% Injectable 25 Gram(s) IV Push once  heparin SubCutaneous Injection - Peds 5000 Unit(s) SubCutaneous every 8 hours  influenza   Vaccine 0.5 milliLiter(s) IntraMuscular once  insulin glargine Injectable (LANTUS) 10 Unit(s) SubCutaneous at bedtime  insulin lispro (ADMELOG) corrective regimen sliding scale   SubCutaneous three times a day before meals  insulin lispro Injectable (ADMELOG) 3 Unit(s) SubCutaneous three times a day before meals  melatonin 5 milliGRAM(s) Oral at bedtime  metoprolol succinate  milliGRAM(s) Oral daily  sevelamer carbonate 800 milliGRAM(s) Oral three times a day with meals    MEDICATIONS  (PRN):  acetaminophen   Tablet .. 650 milliGRAM(s) Oral every 6 hours PRN Temp greater or equal to 38C (100.4F)      FAMILY HISTORY:      Allergies    No Known Allergies    Intolerances        SOCIAL HISTORY:    [  ] Etoh  [  ] Smoking  [  ] Substance abuse     Home Environment:  [  ] Home Alone  [ x ] Lives with Family  [  ] Home Health Aid    Dwelling:  [  ] Apartment  [ x ] Private House  [  ] Adult Home  [  ] Skilled Nursing Facility      [  ] Short Term  [  ] Long Term  [x  ] Stairs 10+10      Elevator [  ]    FUNCTIONAL STATUS PTA: (Check all that apply)  Ambulation: [x   ]Independent   [   ] Requires Assistance   [  ] Dependent     [  ] Non-Ambulatory       Assistive Device: [  ] SA Cane  [  ]  Q Cane  [  ] Walker  [  ]  Wheelchair  ADL : [ x ] Independent    [   ] Requires Assistance    [  ]  Dependent       Vital Signs Last 24 Hrs avss  T(C): 35.6 (21 Mar 2021 13:00), Max: 38.1 (20 Mar 2021 21:00)  T(F): 96 (21 Mar 2021 13:00), Max: 100.6 (20 Mar 2021 21:00)  HR: 72 (21 Mar 2021 13:00) (72 - 79)  BP: 128/58 (21 Mar 2021 13:00) (114/62 - 128/58)  BP(mean): --  RR: 18 (21 Mar 2021 13:00) (17 - 18)  SpO2: 94% (21 Mar 2021 07:31) (94% - 94%)      PHYSICAL EXAM: Alert & Oriented X3  GENERAL: NAD, well-groomed, well-developed  HEAD:  Atraumatic, Normocephalic  EYES: EOMI, PERRL  NECK: Supple  CHEST/LUNG: BL chest expansion  HEART: S1S2  ABDOMEN: Soft, Nontender,   EXTREMITIES:  No clubbing, cyanosis, or edema  ROM:  [ x  ] WFL all extremities = except FX site [  ] Abnormal   (  ) franco  NERVOUS SYSTEM:   Cranial Nerves 2-12: [ x  ] intact  [  ] Abnormal   Motor Strength: [  x ] WFL all extremities = except fx site [  ] Abnormal   Sensation: [ x  ] intact to light touch  [  ]    Reflexes: [  x ] Symmetric  [  ]  Abnormal     FUNCTIONAL STATUS: see therapy        LABS:           anemia             7.6    7.00  )-----------( 178      ( 21 Mar 2021 07:33 )             23.8     03-21    135  |  102  |  70<HH>  ----------------------------<  206<H>  4.9   |  20  |  4.8<HH>    Ca    7.3<L>      21 Mar 2021 07:33  Mg     1.6     03-21    TPro  5.9<L>  /  Alb  3.2<L>  /  TBili  0.6  /  DBili  x   /  AST  16  /  ALT  11  /  AlkPhos  96  03-21    PT/INR - ( 20 Mar 2021 08:10 )   PT: 14.20 sec;   INR: 1.23 ratio         PTT - ( 20 Mar 2021 08:10 )  PTT:33.0 sec      RADIOLOGY     < from: Xray Femur 2 Views, Left (03.18.21 @ 19:47) >  EXAM:  XR FEMUR 2 VIEWS LT        EXAM:  XR PELVIS AP ONLY 1-2 VIEWS            PROCEDURE DATE:  03/18/2021            INTERPRETATION:  Clinical History / Reason for exam: Trauma    Technique: Single AP view of the pelvis and 4 views of the left femur    Comparison: None.    Findings/  impression:    Acute mildly displaced left femoral intertrochanteric fracture. Osteopenia. Vascular calcifications. Degenerative changes of the spine.        < end of copied text >

## 2021-03-21 NOTE — PROGRESS NOTE ADULT - ASSESSMENT
ORTHOPEDIC SURGERY Progress note     75y Male /sp L hip ORIF POD 1. Resting comfortably in bed, no complaints.       Denies numbness/tingling.  Denies f/c/n/v/cp/sob.    Medications:  ceFAZolin   IVPB 500 milliGRAM(s) IV Intermittent every 12 hours  dextrose 40% Gel 15 Gram(s) Oral once  dextrose 50% Injectable 25 Gram(s) IV Push once  dextrose 50% Injectable 12.5 Gram(s) IV Push once  dextrose 50% Injectable 25 Gram(s) IV Push once  heparin SubCutaneous Injection - Peds 5000 Unit(s) SubCutaneous every 8 hours  influenza   Vaccine 0.5 milliLiter(s) IntraMuscular once  insulin glargine Injectable (LANTUS) 10 Unit(s) SubCutaneous at bedtime  insulin lispro (ADMELOG) corrective regimen sliding scale   SubCutaneous three times a day before meals  insulin lispro Injectable (ADMELOG) 3 Unit(s) SubCutaneous three times a day before meals    No Known Allergies      PMH/PSH:  CHF (congestive heart failure)    HLD (hyperlipidemia)    HTN (hypertension)    CKD (chronic kidney disease)    CAD (coronary artery disease)    DM (diabetes mellitus)    No significant past surgical history        Exam:  T(C): 36.3 (03-20-21 @ 12:48), Max: 36.7 (03-20-21 @ 05:38)  HR: 62 (03-20-21 @ 13:40) (60 - 73)  BP: 122/57 (03-20-21 @ 13:40) (122/57 - 163/74)  RR: 16 (03-20-21 @ 13:40) (14 - 18)  SpO2: 97% (03-20-21 @ 12:48) (94% - 100%)  General: Awake, alert, NAD, AAOx3    LLE: Dressing c/d/i SILT s/s/sp/dp/t.  Motor intact EHL, TA, Gastroc.  No ecchymosis or gross deformity.  Palpable DP, PT pulses      Labs:                        9.1    6.61  )-----------( 205      ( 20 Mar 2021 17:57 )             28.7     03-20    140  |  105  |  74<HH>  ----------------------------<  91  4.3   |  22  |  4.3<HH>    Ca    7.2<L>      20 Mar 2021 05:38  Phos  5.8     03-19  Mg     1.8     03-19    TPro  6.3  /  Alb  3.6  /  TBili  0.7  /  DBili  x   /  AST  18  /  ALT  22  /  AlkPhos  114  03-20    PT/INR - ( 20 Mar 2021 08:10 )   PT: 14.20 sec;   INR: 1.23 ratio         PTT - ( 20 Mar 2021 08:10 )  PTT:33.0 sec    A/P:  75yMale s/p L hip IMN POD 1       - WBAT LLE  - DVT PPX  - Ancef 2g for q8 for 24h   - pt consult   - ortho to follow

## 2021-03-21 NOTE — PHYSICAL THERAPY INITIAL EVALUATION ADULT - GENERAL OBSERVATIONS, REHAB EVAL
Chart reviewed, spoke with , OOB orders updated, pt encountered supine, NAD, +IV, agreeable, IE completed 11:10-11:40

## 2021-03-21 NOTE — CONSULT NOTE ADULT - REASON FOR ADMISSION
Left femoral fracture secondary to mechanical fall

## 2021-03-21 NOTE — PROGRESS NOTE ADULT - SUBJECTIVE AND OBJECTIVE BOX
VINAYWILBERT VARMA  75y  [facility] T3-3B [address]      Patient is a 75y old  Male who presents with a chief complaint of Left femoral fracture secondary to mechanical fall (21 Mar 2021 06:57)      INTERVAL HPI/OVERNIGHT EVENTS:        REVIEW OF SYSTEMS:  CONSTITUTIONAL: No fever, weight loss, or fatigue  EYES: No eye pain, visual disturbances, or discharge  ENMT:  No difficulty hearing, tinnitus, vertigo; No sinus or throat pain  NECK: No pain or stiffness  BREASTS: No pain, masses, or nipple discharge  RESPIRATORY: No cough, wheezing, chills or hemoptysis; No shortness of breath  CARDIOVASCULAR: No chest pain, palpitations, dizziness, or leg swelling  GASTROINTESTINAL: No abdominal or epigastric pain. No nausea, vomiting, or hematemesis; No diarrhea or constipation. No melena or hematochezia.  GENITOURINARY: No dysuria, frequency, hematuria, or incontinence  NEUROLOGICAL: No headaches, memory loss, loss of strength, numbness, or tremors  SKIN: No itching, burning, rashes, or lesions   LYMPH NODES: No enlarged glands  ENDOCRINE: No heat or cold intolerance; No hair loss  MUSCULOSKELETAL: No joint pain or swelling; No muscle, back, or extremity pain  PSYCHIATRIC: No depression, anxiety, mood swings, or difficulty sleeping  HEME/LYMPH: No easy bruising, or bleeding gums  ALLERY AND IMMUNOLOGIC: No hives or eczema  FAMILY HISTORY:    T(C): 37.8 (03-21-21 @ 05:49), Max: 38.1 (03-20-21 @ 21:00)  HR: 73 (03-21-21 @ 05:49) (60 - 79)  BP: 116/56 (03-21-21 @ 05:49) (114/62 - 163/74)  RR: 17 (03-21-21 @ 05:49) (14 - 18)  SpO2: 94% (03-21-21 @ 07:31) (94% - 100%)  Wt(kg): --Vital Signs Last 24 Hrs  T(C): 37.8 (21 Mar 2021 05:49), Max: 38.1 (20 Mar 2021 21:00)  T(F): 100 (21 Mar 2021 05:49), Max: 100.6 (20 Mar 2021 21:00)  HR: 73 (21 Mar 2021 05:49) (60 - 79)  BP: 116/56 (21 Mar 2021 05:49) (114/62 - 163/74)  BP(mean): --  RR: 17 (21 Mar 2021 05:49) (14 - 18)  SpO2: 94% (21 Mar 2021 07:31) (94% - 100%)    PHYSICAL EXAM:  GENERAL: NAD, well-groomed, well-developed  HEAD:  Atraumatic, Normocephalic  EYES: EOMI, PERRLA, conjunctiva and sclera clear  ENMT: No tonsillar erythema, exudates, or enlargement; Moist mucous membranes, Good dentition, No lesions  NECK: Supple, No JVD, Normal thyroid  NERVOUS SYSTEM:  Alert & Oriented X3, Good concentration; Motor Strength 5/5 B/L upper and lower extremities; DTRs 2+ intact and symmetric  PULM: Clear to auscultation bilaterally  CARDIAC: Regular rate and rhythm; No murmurs, rubs, or gallops  GI: Soft, Nontender, Nondistended; Bowel sounds present  EXTREMITIES:  2+ Peripheral Pulses, No clubbing, cyanosis, or edema  LYMPH: No lymphadenopathy noted  SKIN: No rashes or lesions    Consultant(s) Notes Reviewed:  [x ] YES  [ ] NO  Care Discussed with Consultants/Other Providers [ x] YES  [ ] NO    LABS:                            7.6    7.00  )-----------( 178      ( 21 Mar 2021 07:33 )             23.8   03-21    135  |  102  |  70<HH>  ----------------------------<  206<H>  4.9   |  20  |  4.8<HH>    Ca    7.3<L>      21 Mar 2021 07:33  Mg     1.6     03-21    TPro  5.9<L>  /  Alb  3.2<L>  /  TBili  0.6  /  DBili  x   /  AST  16  /  ALT  11  /  AlkPhos  96  03-21            acetaminophen   Tablet .. 650 milliGRAM(s) Oral every 6 hours PRN  ceFAZolin   IVPB 500 milliGRAM(s) IV Intermittent every 12 hours  cholecalciferol 1000 Unit(s) Oral daily  dextrose 40% Gel 15 Gram(s) Oral once  dextrose 50% Injectable 25 Gram(s) IV Push once  dextrose 50% Injectable 12.5 Gram(s) IV Push once  dextrose 50% Injectable 25 Gram(s) IV Push once  heparin SubCutaneous Injection - Peds 5000 Unit(s) SubCutaneous every 8 hours  influenza   Vaccine 0.5 milliLiter(s) IntraMuscular once  insulin glargine Injectable (LANTUS) 10 Unit(s) SubCutaneous at bedtime  insulin lispro (ADMELOG) corrective regimen sliding scale   SubCutaneous three times a day before meals  insulin lispro Injectable (ADMELOG) 3 Unit(s) SubCutaneous three times a day before meals  melatonin 5 milliGRAM(s) Oral at bedtime      HEALTH ISSUES - PROBLEM Dx:          Case Discussed with House Staff      Spectra [phone]

## 2021-03-21 NOTE — PROGRESS NOTE ADULT - SUBJECTIVE AND OBJECTIVE BOX
Nephrology progress note    Patient was seen and examined, events over the last 24 h noted .    Allergies:  No Known Allergies    Hospital Medications:   MEDICATIONS  (STANDING):  ceFAZolin   IVPB 500 milliGRAM(s) IV Intermittent every 12 hours  cholecalciferol 1000 Unit(s) Oral daily  dextrose 40% Gel 15 Gram(s) Oral once  dextrose 50% Injectable 25 Gram(s) IV Push once  dextrose 50% Injectable 12.5 Gram(s) IV Push once  dextrose 50% Injectable 25 Gram(s) IV Push once  heparin SubCutaneous Injection - Peds 5000 Unit(s) SubCutaneous every 8 hours  influenza   Vaccine 0.5 milliLiter(s) IntraMuscular once  insulin glargine Injectable (LANTUS) 10 Unit(s) SubCutaneous at bedtime  insulin lispro (ADMELOG) corrective regimen sliding scale   SubCutaneous three times a day before meals  insulin lispro Injectable (ADMELOG) 3 Unit(s) SubCutaneous three times a day before meals  melatonin 5 milliGRAM(s) Oral at bedtime        VITALS:  T(F): 100 (03-21-21 @ 05:49), Max: 100.6 (03-20-21 @ 21:00)  HR: 73 (03-21-21 @ 05:49)  BP: 116/56 (03-21-21 @ 05:49)  RR: 17 (03-21-21 @ 05:49)  SpO2: 94% (03-21-21 @ 07:31)  Wt(kg): --    03-19 @ 07:01  -  03-20 @ 07:00  --------------------------------------------------------  IN: 0 mL / OUT: 1800 mL / NET: -1800 mL          PHYSICAL EXAM:  	Gen: NAD  	Pulm: CTA B/L  	CV:  S1S2; no rub  	Abd: soft, nontender/nondistended      LABS:  03-21    135  |  102  |  70<HH>  ----------------------------<  206<H>  4.9   |  20  |  4.8<HH>    Ca    7.3<L>      21 Mar 2021 07:33  Mg     1.6     03-21    TPro  5.9<L>  /  Alb  3.2<L>  /  TBili  0.6  /  DBili      /  AST  16  /  ALT  11  /  AlkPhos  96  03-21                          7.6    7.00  )-----------( 178      ( 21 Mar 2021 07:33 )             23.8       Urine Studies:      RADIOLOGY & ADDITIONAL STUDIES:

## 2021-03-21 NOTE — CONSULT NOTE ADULT - ASSESSMENT
IMPRESSION: Rehab of gait disorder, debility due to    PRECAUTIONS: [    ] Cardiac  [    ] Respiratory  [    ] Seizures [    ] Contact Isolation  [    ] Droplet Isolation  [    ] falls   (    ) franco [  ]TLSO OOB  [   ] cervical collar== OOB,  [  ] and in bed  Weight Bearing Status: WBAT  RECOMMENDATION:  ( ) podiatry    Out of Bed to Chair     DVT/Decubiti Prophylaxis    REHAB PLAN:     [   x  ] Bedside P/T 3-5 times a week   [     ] Bedside O/T  2-3 times a week   [     ] No Rehab Therapy Indicated   [     ]  Speech Therapy   Conditioning/ROM                                 ADL  Bed Mobility                                            Conditioning/ROM  Transfers                                                  Bed Mobility  Sitting /Standing Balance                      Transfers                                        Gait Training                                            Sitting/Standing Balance  Stair Training [   ]Applicable                 Home equipment Eval                                                                     Splinting  [   ] Only      GOALS:    as per therapy    DISCHARGE PLAN:   [     ]  Good candidate for Intensive Rehabilitation/Hospital based-4A [facility]                                             Will tolerate 3hrs Intensive Rehab Daily                                       [   x   ]  Short Term Rehab in Skilled Nursing Facility  VS                                     [   x   ]  Home with Outpatient or VN services                                         [      ]  Possible Candidate for Intensive Hospital based Rehab                                        IMPRESSION: Rehab of gait disorder, debility due to hip fx    PRECAUTIONS: [    ] Cardiac  [    ] Respiratory  [    ] Seizures [    ] Contact Isolation  [    ] Droplet Isolation  [    ] falls   (    ) franco [  ]TLSO OOB  [   ] cervical collar== OOB,  [  ] and in bed  Weight Bearing Status: see ortho  RECOMMENDATION:  ( ) podiatry    Out of Bed to Chair     DVT/Decubiti Prophylaxis    REHAB PLAN:     [   x  ] Bedside P/T 3-5 times a week   [   x  ] Bedside O/T  2-3 times a week   [     ] No Rehab Therapy Indicated   [     ]  Speech Therapy   Conditioning/ROM                                 ADL  Bed Mobility                                            Conditioning/ROM  Transfers                                                  Bed Mobility  Sitting /Standing Balance                      Transfers                                        Gait Training                                            Sitting/Standing Balance  Stair Training [   ]Applicable                 Home equipment Eval                                                                     Splinting  [   ] Only      GOALS:    as per therapy    DISCHARGE PLAN:   [     ]  Good candidate for Intensive Rehabilitation/Hospital based-4A [facility]                                             Will tolerate 3hrs Intensive Rehab Daily                                       [     ]  Short Term Rehab in Skilled Nursing Facility  VS                                     [   x   ]  Home with Outpatient or  services                                         [    x  ]  Possible Candidate for Intensive Hospital based Rehab jose Gentile

## 2021-03-21 NOTE — PHYSICAL THERAPY INITIAL EVALUATION ADULT - GAIT DEVIATIONS NOTED, PT EVAL
decreased steffany/increased time in double stance/decreased step length/decreased weight-shifting ability

## 2021-03-21 NOTE — PROGRESS NOTE ADULT - ASSESSMENT
75M with PMH of CKD 5 (follows with Dr. Driscoll at [facility]), CAD s/p PCI/CABG, DM2, HLD, prostate cancer s/p prostatectomy, admitted for L intertrochanteric femoral fx d/t mechanical fall    # CKD V, likely due to DM/HTN  # CKD-MBD, hyperphos/hypocalcemia  # Low anion gap metabolic acidosis d/t renal failure  # Normocytic anemia due to advanced CKD,  planned for transfusion   # L intertrochanteric hip fx  - does not have access for RRT  - non oliguric  -  on phoslo 1 tab qac check intact PTH level,  continue calcitriol 0.5mcg daily  - maintain on renal diet  - obtain UA  - document strict i/o and daily weights  - renal/bladder ultrasound if decreasing urine output or rising creatinine   - check iron stores  - avoid nephrotoxins  - appreciate ortho f/u  - no acute indication for RRT   -  will follow

## 2021-03-21 NOTE — PROGRESS NOTE ADULT - ASSESSMENT
HPI:  75 year old male has medical history of CKD V, CAD s/p PCI and CABG 15+ years ago, DM, Lower extremity edema and HLD presented with mechanical fall. Patient slipped on wet floor lost balance, denies hitting head, dizziness or loss of consciousness. On review of system patient denies Nausea, vomiting, fever, chills, headaches, SOB, weight loss, chest pain, abdominal pain, muscle weakness, lower extremity swelling, urinary or bowel habit changes. On physical, unable to lift left lower extremity due to pain, reports of having good sensation. Xray showed Left femoral fracture. Orthopedic team requesting medical and cardiac clearance for possible OR procedure tomorrow. (19 Mar 2021 02:38)    #Left intertrochanteric hip fx  cw ancef per ortho recommendations  pt / physiatry ordered  pain management    #Suspected kim on CKD 5 - nephro follow up , will hold on iv fluids due to possible blood transfusion and chf , repeat bmp today     #CAD s/p PCI and CABG    #DM   CAPILLARY BLOOD GLUCOSE      POCT Blood Glucose.: 126 mg/dL (21 Mar 2021 11:15)  POCT Blood Glucose.: 203 mg/dL (21 Mar 2021 07:22)  POCT Blood Glucose.: 254 mg/dL (20 Mar 2021 21:41)  POCT Blood Glucose.: 278 mg/dL (20 Mar 2021 16:59)  controlled    #DL     #Anemia - repeat cbc , target is greater than 8 in light of cad history , if persistently below 8 will need transfusion likely componenet of acute blood loss anemia and renal disease    #Mild aortic regurgitation.    # Mild pulmonic valve regurgitation    #Chronic diastolic and systolic congestive heart failure     #Suspected vitamin d deficiency     #Insomnia on melatonin     Progress Note Handoff    Pending:  repeat cbc , pt , nephro follow up   Family discussion: patient verbalized understanding and agreeable to plan of care     Disposition: TBD based on pt

## 2021-03-22 ENCOUNTER — TRANSCRIPTION ENCOUNTER (OUTPATIENT)
Age: 76
End: 2021-03-22

## 2021-03-22 LAB
ALBUMIN SERPL ELPH-MCNC: 3.5 G/DL — SIGNIFICANT CHANGE UP (ref 3.5–5.2)
ALP SERPL-CCNC: 84 U/L — SIGNIFICANT CHANGE UP (ref 30–115)
ALT FLD-CCNC: <5 U/L — SIGNIFICANT CHANGE UP (ref 0–41)
ANION GAP SERPL CALC-SCNC: 13 MMOL/L — SIGNIFICANT CHANGE UP (ref 7–14)
AST SERPL-CCNC: 17 U/L — SIGNIFICANT CHANGE UP (ref 0–41)
BILIRUB SERPL-MCNC: 0.5 MG/DL — SIGNIFICANT CHANGE UP (ref 0.2–1.2)
BUN SERPL-MCNC: 61 MG/DL — CRITICAL HIGH (ref 10–20)
CALCIUM SERPL-MCNC: 7.2 MG/DL — LOW (ref 8.4–10.5)
CALCIUM SERPL-MCNC: 7.8 MG/DL — LOW (ref 8.5–10.1)
CHLORIDE SERPL-SCNC: 101 MMOL/L — SIGNIFICANT CHANGE UP (ref 98–110)
CO2 SERPL-SCNC: 22 MMOL/L — SIGNIFICANT CHANGE UP (ref 17–32)
CREAT SERPL-MCNC: 4.3 MG/DL — CRITICAL HIGH (ref 0.7–1.5)
GLUCOSE BLDC GLUCOMTR-MCNC: 104 MG/DL — HIGH (ref 70–99)
GLUCOSE BLDC GLUCOMTR-MCNC: 197 MG/DL — HIGH (ref 70–99)
GLUCOSE BLDC GLUCOMTR-MCNC: 211 MG/DL — HIGH (ref 70–99)
GLUCOSE BLDC GLUCOMTR-MCNC: 418 MG/DL — HIGH (ref 70–99)
GLUCOSE SERPL-MCNC: 113 MG/DL — HIGH (ref 70–99)
HCT VFR BLD CALC: 23.4 % — LOW (ref 42–52)
HCT VFR BLD CALC: 23.7 % — LOW (ref 42–52)
HGB BLD-MCNC: 7.4 G/DL — LOW (ref 14–18)
HGB BLD-MCNC: 7.7 G/DL — LOW (ref 14–18)
IRON SATN MFR SERPL: 12 % — LOW (ref 15–50)
IRON SATN MFR SERPL: 22 UG/DL — LOW (ref 35–150)
MAGNESIUM SERPL-MCNC: 2 MG/DL — SIGNIFICANT CHANGE UP (ref 1.8–2.4)
MCHC RBC-ENTMCNC: 30.2 PG — SIGNIFICANT CHANGE UP (ref 27–31)
MCHC RBC-ENTMCNC: 30.7 PG — SIGNIFICANT CHANGE UP (ref 27–31)
MCHC RBC-ENTMCNC: 31.6 G/DL — LOW (ref 32–37)
MCHC RBC-ENTMCNC: 32.5 G/DL — SIGNIFICANT CHANGE UP (ref 32–37)
MCV RBC AUTO: 94.4 FL — HIGH (ref 80–94)
MCV RBC AUTO: 95.5 FL — HIGH (ref 80–94)
NRBC # BLD: 0 /100 WBCS — SIGNIFICANT CHANGE UP (ref 0–0)
NRBC # BLD: 0 /100 WBCS — SIGNIFICANT CHANGE UP (ref 0–0)
PLATELET # BLD AUTO: 164 K/UL — SIGNIFICANT CHANGE UP (ref 130–400)
PLATELET # BLD AUTO: 175 K/UL — SIGNIFICANT CHANGE UP (ref 130–400)
POTASSIUM SERPL-MCNC: 4.9 MMOL/L — SIGNIFICANT CHANGE UP (ref 3.5–5)
POTASSIUM SERPL-SCNC: 4.9 MMOL/L — SIGNIFICANT CHANGE UP (ref 3.5–5)
PROT SERPL-MCNC: 6.1 G/DL — SIGNIFICANT CHANGE UP (ref 6–8)
PTH-INTACT FLD-MCNC: 320 PG/ML — HIGH (ref 15–65)
RBC # BLD: 2.45 M/UL — LOW (ref 4.7–6.1)
RBC # BLD: 2.51 M/UL — LOW (ref 4.7–6.1)
RBC # FLD: 13.4 % — SIGNIFICANT CHANGE UP (ref 11.5–14.5)
RBC # FLD: 13.7 % — SIGNIFICANT CHANGE UP (ref 11.5–14.5)
SODIUM SERPL-SCNC: 136 MMOL/L — SIGNIFICANT CHANGE UP (ref 135–146)
TIBC SERPL-MCNC: 187 UG/DL — LOW (ref 220–430)
TRANSFERRIN SERPL-MCNC: 167 MG/DL — LOW (ref 200–360)
UIBC SERPL-MCNC: 165 UG/DL — SIGNIFICANT CHANGE UP (ref 110–370)
WBC # BLD: 5.11 K/UL — SIGNIFICANT CHANGE UP (ref 4.8–10.8)
WBC # BLD: 5.36 K/UL — SIGNIFICANT CHANGE UP (ref 4.8–10.8)
WBC # FLD AUTO: 5.11 K/UL — SIGNIFICANT CHANGE UP (ref 4.8–10.8)
WBC # FLD AUTO: 5.36 K/UL — SIGNIFICANT CHANGE UP (ref 4.8–10.8)

## 2021-03-22 PROCEDURE — 99233 SBSQ HOSP IP/OBS HIGH 50: CPT

## 2021-03-22 RX ORDER — OXYCODONE HYDROCHLORIDE 5 MG/1
5 TABLET ORAL EVERY 4 HOURS
Refills: 0 | Status: DISCONTINUED | OUTPATIENT
Start: 2021-03-22 | End: 2021-03-24

## 2021-03-22 RX ORDER — SENNA PLUS 8.6 MG/1
2 TABLET ORAL AT BEDTIME
Refills: 0 | Status: DISCONTINUED | OUTPATIENT
Start: 2021-03-22 | End: 2021-03-24

## 2021-03-22 RX ORDER — CALCIUM ACETATE 667 MG
667 TABLET ORAL
Refills: 0 | Status: DISCONTINUED | OUTPATIENT
Start: 2021-03-22 | End: 2021-03-24

## 2021-03-22 RX ORDER — ASPIRIN/CALCIUM CARB/MAGNESIUM 324 MG
81 TABLET ORAL DAILY
Refills: 0 | Status: DISCONTINUED | OUTPATIENT
Start: 2021-03-22 | End: 2021-03-24

## 2021-03-22 RX ORDER — POLYETHYLENE GLYCOL 3350 17 G/17G
17 POWDER, FOR SOLUTION ORAL DAILY
Refills: 0 | Status: DISCONTINUED | OUTPATIENT
Start: 2021-03-22 | End: 2021-03-24

## 2021-03-22 RX ORDER — CALCITRIOL 0.5 UG/1
0.5 CAPSULE ORAL DAILY
Refills: 0 | Status: DISCONTINUED | OUTPATIENT
Start: 2021-03-22 | End: 2021-03-24

## 2021-03-22 RX ADMIN — Medication 1000 UNIT(S): at 11:20

## 2021-03-22 RX ADMIN — HEPARIN SODIUM 5000 UNIT(S): 5000 INJECTION INTRAVENOUS; SUBCUTANEOUS at 21:59

## 2021-03-22 RX ADMIN — Medication 100 MILLIGRAM(S): at 06:44

## 2021-03-22 RX ADMIN — SEVELAMER CARBONATE 800 MILLIGRAM(S): 2400 POWDER, FOR SUSPENSION ORAL at 12:44

## 2021-03-22 RX ADMIN — Medication 3 UNIT(S): at 11:20

## 2021-03-22 RX ADMIN — Medication 3 UNIT(S): at 17:32

## 2021-03-22 RX ADMIN — SEVELAMER CARBONATE 800 MILLIGRAM(S): 2400 POWDER, FOR SUSPENSION ORAL at 08:54

## 2021-03-22 RX ADMIN — Medication 12: at 11:19

## 2021-03-22 RX ADMIN — Medication 100 MILLIGRAM(S): at 06:43

## 2021-03-22 RX ADMIN — OXYCODONE HYDROCHLORIDE 5 MILLIGRAM(S): 5 TABLET ORAL at 21:55

## 2021-03-22 RX ADMIN — POLYETHYLENE GLYCOL 3350 17 GRAM(S): 17 POWDER, FOR SOLUTION ORAL at 21:59

## 2021-03-22 RX ADMIN — HEPARIN SODIUM 5000 UNIT(S): 5000 INJECTION INTRAVENOUS; SUBCUTANEOUS at 06:43

## 2021-03-22 RX ADMIN — Medication 2: at 17:32

## 2021-03-22 RX ADMIN — HEPARIN SODIUM 5000 UNIT(S): 5000 INJECTION INTRAVENOUS; SUBCUTANEOUS at 13:23

## 2021-03-22 RX ADMIN — SENNA PLUS 2 TABLET(S): 8.6 TABLET ORAL at 21:58

## 2021-03-22 RX ADMIN — MORPHINE SULFATE 2 MILLIGRAM(S): 50 CAPSULE, EXTENDED RELEASE ORAL at 00:00

## 2021-03-22 RX ADMIN — ATORVASTATIN CALCIUM 10 MILLIGRAM(S): 80 TABLET, FILM COATED ORAL at 21:58

## 2021-03-22 RX ADMIN — Medication 667 MILLIGRAM(S): at 18:48

## 2021-03-22 RX ADMIN — Medication 81 MILLIGRAM(S): at 21:58

## 2021-03-22 RX ADMIN — OXYCODONE HYDROCHLORIDE 5 MILLIGRAM(S): 5 TABLET ORAL at 21:45

## 2021-03-22 RX ADMIN — INSULIN GLARGINE 10 UNIT(S): 100 INJECTION, SOLUTION SUBCUTANEOUS at 21:59

## 2021-03-22 RX ADMIN — Medication 5 MILLIGRAM(S): at 21:58

## 2021-03-22 RX ADMIN — MORPHINE SULFATE 2 MILLIGRAM(S): 50 CAPSULE, EXTENDED RELEASE ORAL at 00:03

## 2021-03-22 NOTE — OCCUPATIONAL THERAPY INITIAL EVALUATION ADULT - GENERAL OBSERVATIONS, REHAB EVAL
Pt encountered semi-flowers in bed, +IV drip(disconnected by RN), +IV lock, +bed alarm, +call bell, +L hip dressing. Pt left sitting up in bed, +bed alarm, +call bell, +L hip dressing, +IV locks, in NAD

## 2021-03-22 NOTE — DISCHARGE NOTE NURSING/CASE MANAGEMENT/SOCIAL WORK - PATIENT PORTAL LINK FT
You can access the FollowMyHealth Patient Portal offered by [facility] by registering at the following website: [email or web address]. By joining [employer]’s FollowMyHealth portal, you will also be able to view your health information using other applications (apps) compatible with our system.

## 2021-03-22 NOTE — PROGRESS NOTE ADULT - ASSESSMENT
75M with PMH of CKD 5 (follows with Dr. Driscoll at [facility]), CAD s/p PCI/CABG, DM2, HLD, prostate cancer s/p prostatectomy, admitted for L intertrochanteric femoral fx d/t mechanical fall    # CKD V, likely due to DM/HTN  # CKD-MBD, hyperphos/hypocalcemia  # Low anion gap metabolic acidosis d/t renal failure  # Normocytic anemia due to advanced CKD,  planned for transfusion   # L intertrochanteric hip fx  - does not have access for RRT  - non oliguric  - creatinine stable   -  on phoslo 1 tab qac check intact PTH level,  continue calcitriol 0.5mcg daily  - maintain on renal diet  - obtain UA  - check iron stores/ will need GUCCI   - appreciate ortho f/u  - no acute indication for RRT   -  will follow

## 2021-03-22 NOTE — PROGRESS NOTE ADULT - SUBJECTIVE AND OBJECTIVE BOX
seen and examined  no distress   lying comfortable         PAST HISTORY  --------------------------------------------------------------------------------  No significant changes to PMH, PSH, FHx, SHx, unless otherwise noted    ALLERGIES & MEDICATIONS  --------------------------------------------------------------------------------  Allergies    No Known Allergies    Intolerances      Standing Inpatient Medications  atorvastatin 10 milliGRAM(s) Oral at bedtime  ceFAZolin   IVPB 500 milliGRAM(s) IV Intermittent every 12 hours  cholecalciferol 1000 Unit(s) Oral daily  dextrose 40% Gel 15 Gram(s) Oral once  dextrose 50% Injectable 25 Gram(s) IV Push once  dextrose 50% Injectable 12.5 Gram(s) IV Push once  dextrose 50% Injectable 25 Gram(s) IV Push once  heparin SubCutaneous Injection - Peds 5000 Unit(s) SubCutaneous every 8 hours  influenza   Vaccine 0.5 milliLiter(s) IntraMuscular once  insulin glargine Injectable (LANTUS) 10 Unit(s) SubCutaneous at bedtime  insulin lispro (ADMELOG) corrective regimen sliding scale   SubCutaneous three times a day before meals  insulin lispro Injectable (ADMELOG) 3 Unit(s) SubCutaneous three times a day before meals  melatonin 5 milliGRAM(s) Oral at bedtime  metoprolol succinate  milliGRAM(s) Oral daily  sevelamer carbonate 800 milliGRAM(s) Oral three times a day with meals    PRN Inpatient Medications  acetaminophen   Tablet .. 650 milliGRAM(s) Oral every 6 hours PRN        VITALS/PHYSICAL EXAM  --------------------------------------------------------------------------------  T(C): 35.7 (03-22-21 @ 05:18), Max: 38 (03-21-21 @ 21:00)  HR: 75 (03-22-21 @ 05:18) (72 - 92)  BP: 113/59 (03-22-21 @ 05:18) (113/59 - 143/66)  RR: 18 (03-22-21 @ 05:18) (18 - 18)  SpO2: 94% (03-22-21 @ 05:18) (94% - 94%)  Wt(kg): --  Height (cm): 170.2 (03-20-21 @ 09:29)  Weight (kg): 62.6 (03-20-21 @ 09:29)  BMI (kg/m2): 21.6 (03-20-21 @ 09:29)  BSA (m2): 1.73 (03-20-21 @ 09:29)      03-21-21 @ 07:01  -  03-22-21 @ 07:00  --------------------------------------------------------  IN: 0 mL / OUT: 475 mL / NET: -475 mL      Physical Exam:  	Gen: NAD  	Pulm: CTA B/L  	CV:  S1S2; no rub  	Abd:  soft, nontender/nondistended  	    LABS/STUDIES  --------------------------------------------------------------------------------              7.9    5.12  >-----------<  178      [03-21-21 @ 17:49]              25.1     136  |  101  |  68  ----------------------------<  139      [03-21-21 @ 17:49]  4.7   |  21  |  4.7        Ca     7.9     [03-21-21 @ 17:49]      Mg     1.6     [03-21-21 @ 07:33]    TPro  5.9  /  Alb  3.2  /  TBili  0.6  /  DBili  x   /  AST  16  /  ALT  11  /  AlkPhos  96  [03-21-21 @ 07:33]          Creatinine Trend:  SCr 4.7 [03-21 @ 17:49]  SCr 4.8 [03-21 @ 07:33]  SCr 4.3 [03-20 @ 05:38]  SCr 4.5 [03-19 @ 05:42]  SCr 4.9 [03-18 @ 19:10]        Vitamin D (25OH) 14      [03-19-21 @ 09:10]  TSH 2.03      [03-19-21 @ 09:10]  Lipid: chol 150, TG 74, HDL 37, LDL --      [03-19-21 @ 05:42]

## 2021-03-22 NOTE — PROGRESS NOTE ADULT - ASSESSMENT
75 year old man has medical history of CKD V, CAD s/p PCI and CABG 15+ years ago, DM, Lower extremity edema and HLD presented with mechanical fall. Patient slipped on wet floor lost balance, denies hitting head, dizziness or loss of consciousness.     # Left intertrochanteric hip fx 2/2 fall at home; s/p ORIF 3/20  completed 24 abx ppx (d/c ancef)  DVT ppx  PT f/u  Pain: oxy IR 5mg po q4 prn pain + bowel reg  doubt temp 100.4F constitutes infection - cont to follow off abx    # CKD 5 2/2 DM/HTN  base Cr 5  nephro noted    # hypocalcemia; hyperphosphatemia (mild); Vit D Deficiency; secondary hyperparathyroidism  iPTH 320  Phos 5.8  25 OH Vit D 14  Ca 7.8 (alb 3.5)  pt on cholecalciferol 1000 IU po q24 and sevelamer  but renal note says to give:  calcitriol 0.5mcg po q24 and  phoslo 667mg po qac  placed call to clarify w/ renal  sevelamer 800mg q8    # anemia is acute from blood loss in OR and chronic from CKD  would keep hgb >8 (CAD hx)   give 1 u PRBC today  cbc juli    # CAD s/p PCI and CABG; DLD  would add asa 81mg po q24  cont statin  cont BB    # DM - not well controlled  sugars are high and low nl  diabetic diet  FS qac/hs  lantus 10 HS and lisp 3/meal - likley need to adjust juli    # Mild aortic regurgitation; Mild pulmonic valve regurgitation; Chronic diastolic and systolic congestive heart failure   all stable  outpt f/u w/ cardio    # Insomnia on melatonin     # DVT ppx: hep sc    # GI ppx: none    # Activity: WBAT w/ asst and walker; PT f/u; pt will go home w/ outpt or home PT (does NOT need STR @ SNF)    # spoke w/ dtr at bedside to give f/u    Dispo: tx DM; tx pain; PT f/u; start asa; 1u PRBC w/ cbc f/u; anticipate d/c home juli

## 2021-03-22 NOTE — PROGRESS NOTE ADULT - SUBJECTIVE AND OBJECTIVE BOX
WILBERT JULIAN 75y Male  MRN#: [id number]   CODE STATUS:________    SUBJECTIVE  Patient is a 75y old Male who presents with a chief complaint of Left femoral fracture secondary to mechanical fall (22 Mar 2021 08:24)  Currently admitted to medicine with the primary diagnosis of Closed nondisplaced intertrochanteric fracture of left femur, initial encounter    Today is hospital day 4d, and this morning he is resting comfortably and reports no overnight events.       OBJECTIVE  PAST MEDICAL & SURGICAL HISTORY  CHF (congestive heart failure)    HLD (hyperlipidemia)    HTN (hypertension)    CKD (chronic kidney disease)    CAD (coronary artery disease)    DM (diabetes mellitus)      ALLERGIES:  No Known Allergies    MEDICATIONS:  STANDING MEDICATIONS  atorvastatin 10 milliGRAM(s) Oral at bedtime  ceFAZolin   IVPB 500 milliGRAM(s) IV Intermittent every 12 hours  cholecalciferol 1000 Unit(s) Oral daily  dextrose 40% Gel 15 Gram(s) Oral once  dextrose 50% Injectable 25 Gram(s) IV Push once  dextrose 50% Injectable 12.5 Gram(s) IV Push once  dextrose 50% Injectable 25 Gram(s) IV Push once  heparin SubCutaneous Injection - Peds 5000 Unit(s) SubCutaneous every 8 hours  influenza   Vaccine 0.5 milliLiter(s) IntraMuscular once  insulin glargine Injectable (LANTUS) 10 Unit(s) SubCutaneous at bedtime  insulin lispro (ADMELOG) corrective regimen sliding scale   SubCutaneous three times a day before meals  insulin lispro Injectable (ADMELOG) 3 Unit(s) SubCutaneous three times a day before meals  melatonin 5 milliGRAM(s) Oral at bedtime  metoprolol succinate  milliGRAM(s) Oral daily  sevelamer carbonate 800 milliGRAM(s) Oral three times a day with meals    PRN MEDICATIONS  acetaminophen   Tablet .. 650 milliGRAM(s) Oral every 6 hours PRN      VITAL SIGNS: Last 24 Hours  T(C): 35.7 (22 Mar 2021 05:18), Max: 38 (21 Mar 2021 21:00)  T(F): 96.3 (22 Mar 2021 05:18), Max: 100.4 (21 Mar 2021 21:00)  HR: 79 (22 Mar 2021 09:35) (72 - 92)  BP: 129/76 (22 Mar 2021 09:35) (113/59 - 143/66)  BP(mean): --  RR: 18 (22 Mar 2021 05:18) (18 - 18)  SpO2: 94% (22 Mar 2021 05:18) (94% - 94%)    LABS:                        7.4    5.36  )-----------( 175      ( 22 Mar 2021 07:52 )             23.4     03-22    136  |  101  |  x   ----------------------------<  113<H>  4.9   |  22  |  x     Ca    7.8<L>      22 Mar 2021 07:52  Mg     2.0     03-22    TPro  6.1  /  Alb  3.5  /  TBili  0.5  /  DBili  x   /  AST  17  /  ALT  <5  /  AlkPhos  84  03-22                  RADIOLOGY:    PHYSICAL EXAM:    GENERAL: NAD, alert  HEENT:  Atraumatic, Normocephalic. EOMI,  PULMONARY: Clear to auscultation bilaterally; No wheeze  CARDIOVASCULAR: Regular rate and rhythm; No murmurs  GASTROINTESTINAL: Soft, Nontender, Nondistended; Bowel sounds present  MUSCULOSKELETAL:  No clubbing, cyanosis, or edema  NEUROLOGY: non-focal  SKIN: No rashes or lesions    ASSESSMENT & PLAN  75 year old male has medical history of CKD V, CAD s/p PCI and CABG 15+ years ago, DM, HLD admitted for left femur fracture secondary to mechanical fall due to slipping on wet floor    # S/p mechanical fall  # Left femur fracture  - Ortho consulted, Sx 3/20  - Patient is moderate to high risk, METS >4 ( hx of CAD, DM)  -C/w ancef 24 hrs  -PT eval  -Vit D low 14---started on supplement  -TSH wnl    # Lower extremity Edema  - Echo shows EF 20-25%, severe global hypokinesis  - patient on Lasix 80 mg    # CAD s/p PCI and CABG 15 + years  - patient not on aspirin  - c/w statin     # CKD V, Anemia of chronic  - renal following    # DM  - monitor fs  - c/w insulin regimen, adjust accordingly    #) MISC  Activity: IAT  DVT ppx: heparin subq  GI ppx: no need  Diet: DASH  Dispo: anticipate d/c tomorrow, work with PT today  Code: Full

## 2021-03-22 NOTE — OCCUPATIONAL THERAPY INITIAL EVALUATION ADULT - LIVES WITH, PROFILE
Per pt, lives in  with wife, 10 macrina, 10 steps inside to main level, +walk in shower stall.  Reports children are installing grab bars and purchasing DME for shower and toilet

## 2021-03-22 NOTE — PROGRESS NOTE ADULT - SUBJECTIVE AND OBJECTIVE BOX
ORTHO PROGRESS NOTE    Interval History: Resting comfortably in bed, POD2 from left femur IMN. No complaints. Working with PT.     MEDICATIONS  (STANDING):  atorvastatin 10 milliGRAM(s) Oral at bedtime  ceFAZolin   IVPB 500 milliGRAM(s) IV Intermittent every 12 hours  cholecalciferol 1000 Unit(s) Oral daily  dextrose 40% Gel 15 Gram(s) Oral once  dextrose 50% Injectable 25 Gram(s) IV Push once  dextrose 50% Injectable 12.5 Gram(s) IV Push once  dextrose 50% Injectable 25 Gram(s) IV Push once  heparin SubCutaneous Injection - Peds 5000 Unit(s) SubCutaneous every 8 hours  influenza   Vaccine 0.5 milliLiter(s) IntraMuscular once  insulin glargine Injectable (LANTUS) 10 Unit(s) SubCutaneous at bedtime  insulin lispro (ADMELOG) corrective regimen sliding scale   SubCutaneous three times a day before meals  insulin lispro Injectable (ADMELOG) 3 Unit(s) SubCutaneous three times a day before meals  melatonin 5 milliGRAM(s) Oral at bedtime  metoprolol succinate  milliGRAM(s) Oral daily  sevelamer carbonate 800 milliGRAM(s) Oral three times a day with meals    MEDICATIONS  (PRN):  acetaminophen   Tablet .. 650 milliGRAM(s) Oral every 6 hours PRN Temp greater or equal to 38C (100.4F)    Vital Signs Last 24 Hrs  T(C): 35.7 (22 Mar 2021 05:18), Max: 38 (21 Mar 2021 21:00)  T(F): 96.3 (22 Mar 2021 05:18), Max: 100.4 (21 Mar 2021 21:00)  HR: 75 (22 Mar 2021 05:18) (72 - 92)  BP: 113/59 (22 Mar 2021 05:18) (113/59 - 143/66)  RR: 18 (22 Mar 2021 05:18) (18 - 18)  SpO2: 94% (21 Mar 2021 07:31) (94% - 94%)    Physical Exam:   Dressing C/D/I   Compartments soft and compressible  Motor intact distally  SILT distally  CR<2sec, palpable pulses                       7.9    5.12  )-----------( 178      ( 21 Mar 2021 17:49 )             25.1     136  |  101  |  68<HH>  ----------------------------<  139<H>  4.7   |  21  |  4.7<HH>    Ca    7.9<L>      21 Mar 2021 17:49  Mg     1.6     03-21  TPro  5.9<L>  /  Alb  3.2<L>  /  TBili  0.6  /  DBili  x   /  AST  16  /  ALT  11  /  AlkPhos  96  03-21  PT/INR - ( 20 Mar 2021 08:10 )   PT: 14.20 sec;   INR: 1.23 ratio    PTT - ( 20 Mar 2021 08:10 )  PTT:33.0 sec    A/P: 75M POD2 from left femur IMN.   - WBAT LLE  - DVT PPx  - Ancef renal dosing x24 hours post op  - PT/OT

## 2021-03-22 NOTE — PROGRESS NOTE ADULT - SUBJECTIVE AND OBJECTIVE BOX
DALTON JULIANOR  75y  Male  ***My note supersedes ALL resident notes that I sign.  My corrections for their notes are in my note.***    I can be reached directly on [employer] [id number]. My office number is [phone]. My personal cell number is [phone].    INTERVAL EVENTS: Here for f/u of hip fx. Pt is OK at rest, but has fairly signif pain after working w/ PT. Pt actually walker 50' today, which is excellent for POD 2.  He is eating and drinking fine. No CP or SOB.    T(F): 99.5 (03-22-21 @ 14:01), Max: 100.4 (03-21-21 @ 21:00)  HR: 77 (03-22-21 @ 14:01) (75 - 92)  BP: 128/60 (03-22-21 @ 14:01) (113/59 - 143/66)  RR: 18 (03-22-21 @ 14:01) (18 - 18)  SpO2: 94% (03-22-21 @ 05:18) (94% - 94%)    Gen: NAD  HEENT: PERRL, EOMI, mouth clr, nose clr  Neck: no nodes, no JVD, thyroid nl  lungs: clr  hrt: s1 s2 rrr no murmur  abd: soft, NT/ND, no HS megaly  ext: no edema, no c/c  lt hip: surg site OK  neuro: aa ox3, cn intact, can move all 4 ext    LABS:                      7.4     (    95.5   5.36  )-----------( ---------      175      ( 22 Mar 2021 07:52 )             23.4    (    13.4     Hemoglobin: 7.4 g/dL (03-22 @ 07:52) - stable  Hemoglobin: 7.9 g/dL (03-21 @ 17:49)  Hemoglobin: 7.6 g/dL (03-21 @ 07:33) - post-op loss  Hemoglobin: 9.1 g/dL (03-20 @ 17:57)  Hemoglobin: 9.3 g/dL (03-20 @ 05:38)  Hemoglobin: 9.4 g/dL (03-19 @ 21:30)  Hemoglobin: 9.2 g/dL (03-19 @ 05:42)  Hemoglobin: 9.6 g/dL (03-18 @ 19:10)    136   (   101   (   113      03-22-21 @ 07:52  ----------------------               4.9   (   22   (   61                             -----                        4.3  Ca  7.8   Mg  2.0    P   --     136   (   101   (   139      03-21-21 @ 17:49  ----------------------               4.7   (   21   (   68                             -----                        4.7  Ca  7.9   Mg  --    P   --     LFT  6.1  (  0.5  (  17       03-22-21 @ 07:52  -------------------------  3.5  (  84  (  <5    CAPILLARY BLOOD GLUCOSE  POCT Blood Glucose.: 418 (03-22-21 @ 11:13)  POCT Blood Glucose.: 104 (03-22-21 @ 07:42)  POCT Blood Glucose.: 282 (03-21-21 @ 21:19)  POCT Blood Glucose.: 159 (03-21-21 @ 16:39)  POCT Blood Glucose.: 126 (03-21-21 @ 11:15)  POCT Blood Glucose.: 203 (03-21-21 @ 07:22)  POCT Blood Glucose.: 254 (03-20-21 @ 21:41)  POCT Blood Glucose.: 278 (03-20-21 @ 16:59)    RADIOLOGY & ADDITIONAL TESTS:  < from: CT Femur No Cont, Left (03.19.21 @ 10:08) >  FINDINGS:    BONES/JOINTS: 3 part intertrochanteric fracture of the left femur with mild impaction. Mild displacement of the greater trochanter fragment. Moderate degenerative change in the left hip joint.    SOFT TISSUES: Left pelvic sidewall collection with peripheral calcifications measures approximately 5.7 x 4.2 x 6.1 cm, grossly stable since 2017 and likely a postoperative lymphocele. Post prostatectomy. Small bilateral hydroceles. Partial fatty atrophy of the proximal semitendinosus musculature and minimally in the gluteus minimus consistent with prior injury.    OTHER: Extensive vascular calcifications.    IMPRESSION:    1. Mildly displaced three-part intertrochanteric fracture as above.  2. Chronic incidental findings as above.    < end of copied text >    < from: Xray Chest 1 View AP/PA (03.19.21 @ 09:33) >  Impression:    No consolidation, effusion or pneumothorax.    < end of copied text >    < from: Xray Femur 2 Views, Left (03.18.21 @ 19:47) >  impression:    Acute mildly displaced left femoral intertrochanteric fracture. Osteopenia. Vascular calcifications. Degenerative changes of the spine.    < end of copied text >    < from: Xray Pelvis AP only (03.18.21 @ 19:47) >  impression:    Acute mildly displaced left femoral intertrochanteric fracture. Osteopenia. Vascular calcifications. Degenerative changes of the spine.    < end of copied text >    MEDICATIONS:    acetaminophen   Tablet .. 650 milliGRAM(s) Oral every 6 hours PRN  atorvastatin 10 milliGRAM(s) Oral at bedtime  cholecalciferol 1000 Unit(s) Oral daily  heparin SubCutaneous Injection - Peds 5000 Unit(s) SubCutaneous every 8 hours  influenza   Vaccine 0.5 milliLiter(s) IntraMuscular once  insulin glargine Injectable (LANTUS) 10 Unit(s) SubCutaneous at bedtime  insulin lispro (ADMELOG) corrective regimen sliding scale   SubCutaneous three times a day before meals  insulin lispro Injectable (ADMELOG) 3 Unit(s) SubCutaneous three times a day before meals  melatonin 5 milliGRAM(s) Oral at bedtime  metoprolol succinate  milliGRAM(s) Oral daily  oxyCODONE    IR 5 milliGRAM(s) Oral every 4 hours PRN  polyethylene glycol 3350 17 Gram(s) Oral daily  senna 2 Tablet(s) Oral at bedtime  sevelamer carbonate 800 milliGRAM(s) Oral three times a day with meals

## 2021-03-22 NOTE — PROGRESS NOTE ADULT - ATTENDING COMMENTS
Pt. seen/ examined  Pain control improved   Labs/ vitals reviewed  Dressing C/D/I  Receiving PRBCs  NVID  Discussed follow up, discharge planning  Will follow

## 2021-03-23 LAB
ALBUMIN SERPL ELPH-MCNC: 3.3 G/DL — LOW (ref 3.5–5.2)
ALP SERPL-CCNC: 90 U/L — SIGNIFICANT CHANGE UP (ref 30–115)
ALT FLD-CCNC: <5 U/L — SIGNIFICANT CHANGE UP (ref 0–41)
ANION GAP SERPL CALC-SCNC: 15 MMOL/L — HIGH (ref 7–14)
AST SERPL-CCNC: 18 U/L — SIGNIFICANT CHANGE UP (ref 0–41)
BILIRUB SERPL-MCNC: 0.8 MG/DL — SIGNIFICANT CHANGE UP (ref 0.2–1.2)
BUN SERPL-MCNC: 61 MG/DL — CRITICAL HIGH (ref 10–20)
CALCIUM SERPL-MCNC: 8.5 MG/DL — SIGNIFICANT CHANGE UP (ref 8.5–10.1)
CHLORIDE SERPL-SCNC: 105 MMOL/L — SIGNIFICANT CHANGE UP (ref 98–110)
CO2 SERPL-SCNC: 20 MMOL/L — SIGNIFICANT CHANGE UP (ref 17–32)
CREAT SERPL-MCNC: 4.5 MG/DL — CRITICAL HIGH (ref 0.7–1.5)
FERRITIN SERPL-MCNC: 329 NG/ML — SIGNIFICANT CHANGE UP (ref 30–400)
GLUCOSE BLDC GLUCOMTR-MCNC: 118 MG/DL — HIGH (ref 70–99)
GLUCOSE BLDC GLUCOMTR-MCNC: 158 MG/DL — HIGH (ref 70–99)
GLUCOSE BLDC GLUCOMTR-MCNC: 178 MG/DL — HIGH (ref 70–99)
GLUCOSE BLDC GLUCOMTR-MCNC: 98 MG/DL — SIGNIFICANT CHANGE UP (ref 70–99)
GLUCOSE SERPL-MCNC: 88 MG/DL — SIGNIFICANT CHANGE UP (ref 70–99)
HCT VFR BLD CALC: 25.3 % — LOW (ref 42–52)
HGB BLD-MCNC: 8.1 G/DL — LOW (ref 14–18)
MAGNESIUM SERPL-MCNC: 2.1 MG/DL — SIGNIFICANT CHANGE UP (ref 1.8–2.4)
MCHC RBC-ENTMCNC: 30.6 PG — SIGNIFICANT CHANGE UP (ref 27–31)
MCHC RBC-ENTMCNC: 32 G/DL — SIGNIFICANT CHANGE UP (ref 32–37)
MCV RBC AUTO: 95.5 FL — HIGH (ref 80–94)
NRBC # BLD: 0 /100 WBCS — SIGNIFICANT CHANGE UP (ref 0–0)
PHOSPHATE SERPL-MCNC: 5.9 MG/DL — HIGH (ref 2.1–4.9)
PLATELET # BLD AUTO: 174 K/UL — SIGNIFICANT CHANGE UP (ref 130–400)
POTASSIUM SERPL-MCNC: 4.8 MMOL/L — SIGNIFICANT CHANGE UP (ref 3.5–5)
POTASSIUM SERPL-SCNC: 4.8 MMOL/L — SIGNIFICANT CHANGE UP (ref 3.5–5)
PROT SERPL-MCNC: 6.2 G/DL — SIGNIFICANT CHANGE UP (ref 6–8)
RBC # BLD: 2.65 M/UL — LOW (ref 4.7–6.1)
RBC # FLD: 14 % — SIGNIFICANT CHANGE UP (ref 11.5–14.5)
SARS-COV-2 RNA SPEC QL NAA+PROBE: SIGNIFICANT CHANGE UP
SODIUM SERPL-SCNC: 140 MMOL/L — SIGNIFICANT CHANGE UP (ref 135–146)
WBC # BLD: 4.31 K/UL — LOW (ref 4.8–10.8)
WBC # FLD AUTO: 4.31 K/UL — LOW (ref 4.8–10.8)

## 2021-03-23 PROCEDURE — 99232 SBSQ HOSP IP/OBS MODERATE 35: CPT

## 2021-03-23 RX ADMIN — OXYCODONE HYDROCHLORIDE 5 MILLIGRAM(S): 5 TABLET ORAL at 17:03

## 2021-03-23 RX ADMIN — HEPARIN SODIUM 5000 UNIT(S): 5000 INJECTION INTRAVENOUS; SUBCUTANEOUS at 14:36

## 2021-03-23 RX ADMIN — ATORVASTATIN CALCIUM 10 MILLIGRAM(S): 80 TABLET, FILM COATED ORAL at 21:34

## 2021-03-23 RX ADMIN — Medication 667 MILLIGRAM(S): at 17:02

## 2021-03-23 RX ADMIN — OXYCODONE HYDROCHLORIDE 5 MILLIGRAM(S): 5 TABLET ORAL at 13:00

## 2021-03-23 RX ADMIN — OXYCODONE HYDROCHLORIDE 5 MILLIGRAM(S): 5 TABLET ORAL at 23:25

## 2021-03-23 RX ADMIN — HEPARIN SODIUM 5000 UNIT(S): 5000 INJECTION INTRAVENOUS; SUBCUTANEOUS at 05:22

## 2021-03-23 RX ADMIN — Medication 81 MILLIGRAM(S): at 11:56

## 2021-03-23 RX ADMIN — Medication 667 MILLIGRAM(S): at 08:26

## 2021-03-23 RX ADMIN — POLYETHYLENE GLYCOL 3350 17 GRAM(S): 17 POWDER, FOR SOLUTION ORAL at 12:06

## 2021-03-23 RX ADMIN — CALCITRIOL 0.5 MICROGRAM(S): 0.5 CAPSULE ORAL at 11:57

## 2021-03-23 RX ADMIN — Medication 2: at 17:01

## 2021-03-23 RX ADMIN — Medication 3 UNIT(S): at 08:24

## 2021-03-23 RX ADMIN — Medication 3 UNIT(S): at 12:05

## 2021-03-23 RX ADMIN — Medication 100 MILLIGRAM(S): at 05:22

## 2021-03-23 RX ADMIN — Medication 2: at 12:05

## 2021-03-23 RX ADMIN — Medication 3 UNIT(S): at 17:00

## 2021-03-23 RX ADMIN — OXYCODONE HYDROCHLORIDE 5 MILLIGRAM(S): 5 TABLET ORAL at 12:03

## 2021-03-23 RX ADMIN — Medication 667 MILLIGRAM(S): at 11:57

## 2021-03-23 RX ADMIN — OXYCODONE HYDROCHLORIDE 5 MILLIGRAM(S): 5 TABLET ORAL at 17:30

## 2021-03-23 RX ADMIN — HEPARIN SODIUM 5000 UNIT(S): 5000 INJECTION INTRAVENOUS; SUBCUTANEOUS at 21:34

## 2021-03-23 RX ADMIN — SENNA PLUS 2 TABLET(S): 8.6 TABLET ORAL at 21:34

## 2021-03-23 RX ADMIN — Medication 5 MILLIGRAM(S): at 21:34

## 2021-03-23 NOTE — PROGRESS NOTE ADULT - ASSESSMENT
75 year old man has medical history of CKD V, CAD s/p PCI and CABG 15+ years ago, DM, Lower extremity edema and HLD presented with mechanical fall. Patient slipped on wet floor lost balance, denies hitting head, dizziness or loss of consciousness.     # Left intertrochanteric hip fx 2/2 fall at home; s/p ORIF 3/20  completed 24hrs abx ppx (d/c ancef)  DVT ppx  PT f/u  Pain: oxy IR 5mg po q4 prn pain + bowel reg  doubt temp 100.4F constitutes infection - cont to follow off abx    # CKD 5 2/2 DM/HTN  base Cr 5  nephro noted    # hypocalcemia; hyperphosphatemia (mild); Vit D Deficiency; secondary hyperparathyroidism  iPTH 320  Phos 5.8  25 OH Vit D 14  Ca 7.8 (alb 3.5)  spoke w/ renal: d/c cholecalciferol and sevelamer  give:  calcitriol 0.5mcg po q24 and  phoslo 667mg po qac    # anemia is acute from blood loss in OR and chronic from CKD  would keep hgb >8 (CAD hx)     # CAD s/p PCI and CABG; DLD  asa 81mg po q24  cont statin  cont BB    # DM - control improving  diabetic diet  FS qac/hs  lantus 10 HS and lisp 3/meal     # Mild aortic regurgitation; Mild pulmonic valve regurgitation; Chronic diastolic and systolic congestive heart failure   all stable  outpt f/u w/ cardio    # Insomnia on melatonin     # DVT ppx: hep sc    # GI ppx: none    # Activity: WBAT w/ asst and walker; PT f/u; pt will go home w/ outpt or home PT (does NOT need STR @ SNF)    Dispo: tx DM; tx pain; PT f/u; anticipate d/c home ujli

## 2021-03-23 NOTE — PROGRESS NOTE ADULT - SUBJECTIVE AND OBJECTIVE BOX
Nephrology progress note    Patient is seen and examined, events over the last 24 h noted .  No complaints  Allergies:  No Known Allergies    Hospital Medications:   MEDICATIONS  (STANDING):  aspirin  chewable 81 milliGRAM(s) Oral daily  atorvastatin 10 milliGRAM(s) Oral at bedtime  calcitriol   Capsule 0.5 MICROGram(s) Oral daily  calcium acetate 667 milliGRAM(s) Oral three times a day with meals  dextrose 40% Gel 15 Gram(s) Oral once  dextrose 50% Injectable 25 Gram(s) IV Push once  dextrose 50% Injectable 12.5 Gram(s) IV Push once  dextrose 50% Injectable 25 Gram(s) IV Push once  heparin SubCutaneous Injection - Peds 5000 Unit(s) SubCutaneous every 8 hours  influenza   Vaccine 0.5 milliLiter(s) IntraMuscular once  insulin glargine Injectable (LANTUS) 10 Unit(s) SubCutaneous at bedtime  insulin lispro (ADMELOG) corrective regimen sliding scale   SubCutaneous three times a day before meals  insulin lispro Injectable (ADMELOG) 3 Unit(s) SubCutaneous three times a day before meals  melatonin 5 milliGRAM(s) Oral at bedtime  metoprolol succinate  milliGRAM(s) Oral daily  polyethylene glycol 3350 17 Gram(s) Oral daily  senna 2 Tablet(s) Oral at bedtime        VITALS:  T(F): 98 (03-23-21 @ 13:43), Max: 99.1 (03-22-21 @ 21:25)  HR: 72 (03-23-21 @ 13:43)  BP: 117/60 (03-23-21 @ 13:43)  RR: 18 (03-23-21 @ 13:43)  SpO2: --  Wt(kg): --    03-21 @ 07:01  -  03-22 @ 07:00  --------------------------------------------------------  IN: 0 mL / OUT: 475 mL / NET: -475 mL    03-22 @ 07:01  -  03-23 @ 07:00  --------------------------------------------------------  IN: 200 mL / OUT: 750 mL / NET: -550 mL          PHYSICAL EXAM:  Constitutional: NAD  HEENT: anicteric sclera  Neck: No JVD  Respiratory: CTA  Cardiovascular: S1, S2, RRR  Gastrointestinal: BS+, soft, NT/ND  Extremities:  No peripheral edema  Neurological: A/O x 3, no focal deficits  : No CVA tenderness. No franco.   Skin: No rashes  Vascular Access:    LABS:  03-23    140  |  105  |  61<HH>  ----------------------------<  88  4.8   |  20  |  4.5<HH>  Creatinine Trend: 4.5<--, 4.3<--, 4.7<--, 4.8<--, 4.3<--, 4.5<--    Ca    8.5      23 Mar 2021 06:22  Phos  5.9     03-23  Mg     2.1     03-23    TPro  6.2  /  Alb  3.3<L>  /  TBili  0.8  /  DBili      /  AST  18  /  ALT  <5  /  AlkPhos  90  03-23                          8.1    4.31  )-----------( 174      ( 23 Mar 2021 06:22 )             25.3       Urine Studies:      RADIOLOGY & ADDITIONAL STUDIES:  < from: Xray Chest 1 View AP/PA (03.19.21 @ 09:33) >  Technique/Positioning:  Frontal radiograph of the chest.    Support devices:  none    Cardiac/mediastinum/hilum: Stable cardiac silhouette post sternotomy    Lung parenchyma/ Pleura: No focal parenchymal opacities, effusion or pneumothorax is present.      Skeleton/soft tissues: No focal skeletal lesions are identified.      Impression:    No consolidation, effusion or pneumothorax..    < end of copied text >

## 2021-03-23 NOTE — PROGRESS NOTE ADULT - ASSESSMENT
75M with PMH of CKD 5 (follows with Dr. Driscoll at [facility]), CAD s/p PCI/CABG, DM2, HLD, prostate cancer s/p prostatectomy, admitted for L intertrochanteric femoral fx d/t mechanical fall    # CKD V, likely due to DM/HTN  # CKD-MBD, hyperphos/hypocalcemia  # Low anion gap metabolic acidosis d/t renal failure  # Normocytic anemia due to advanced CKD,  planned for transfusion   # L intertrochanteric hip fx  - does not have access for RRT  - non oliguric, check Bladder SONO for PVR  - creatinine stable   -  on phoslo 1 tab qac check intact PTH level,  continue calcitriol 0.5mcg daily  - check iron stores/ will need GUCCI   - appreciate ortho f/u  - no acute indication for RRT   -  will follow

## 2021-03-23 NOTE — PROGRESS NOTE ADULT - SUBJECTIVE AND OBJECTIVE BOX
WILBERT JULIAN 75y Male  MRN#: [id number]   CODE STATUS:________    SUBJECTIVE  Patient is a 75y old Male who presents with a chief complaint of Left femoral fracture secondary to mechanical fall (23 Mar 2021 08:17)  Currently admitted to medicine with the primary diagnosis of Closed nondisplaced intertrochanteric fracture of left femur, initial encounter     Today is hospital day 5d, and this morning he is resting comfortably and reports no overnight events.       OBJECTIVE  PAST MEDICAL & SURGICAL HISTORY  CHF (congestive heart failure)    HLD (hyperlipidemia)    HTN (hypertension)    CKD (chronic kidney disease)    CAD (coronary artery disease)    DM (diabetes mellitus)      ALLERGIES:  No Known Allergies    MEDICATIONS:  STANDING MEDICATIONS  aspirin  chewable 81 milliGRAM(s) Oral daily  atorvastatin 10 milliGRAM(s) Oral at bedtime  calcitriol   Capsule 0.5 MICROGram(s) Oral daily  calcium acetate 667 milliGRAM(s) Oral three times a day with meals  dextrose 40% Gel 15 Gram(s) Oral once  dextrose 50% Injectable 25 Gram(s) IV Push once  dextrose 50% Injectable 12.5 Gram(s) IV Push once  dextrose 50% Injectable 25 Gram(s) IV Push once  heparin SubCutaneous Injection - Peds 5000 Unit(s) SubCutaneous every 8 hours  influenza   Vaccine 0.5 milliLiter(s) IntraMuscular once  insulin glargine Injectable (LANTUS) 10 Unit(s) SubCutaneous at bedtime  insulin lispro (ADMELOG) corrective regimen sliding scale   SubCutaneous three times a day before meals  insulin lispro Injectable (ADMELOG) 3 Unit(s) SubCutaneous three times a day before meals  melatonin 5 milliGRAM(s) Oral at bedtime  metoprolol succinate  milliGRAM(s) Oral daily  polyethylene glycol 3350 17 Gram(s) Oral daily  senna 2 Tablet(s) Oral at bedtime    PRN MEDICATIONS  acetaminophen   Tablet .. 650 milliGRAM(s) Oral every 6 hours PRN  oxyCODONE    IR 5 milliGRAM(s) Oral every 4 hours PRN      VITAL SIGNS: Last 24 Hours  T(C): 36.8 (23 Mar 2021 05:23), Max: 37.5 (22 Mar 2021 14:01)  T(F): 98.2 (23 Mar 2021 05:23), Max: 99.5 (22 Mar 2021 14:01)  HR: 71 (23 Mar 2021 05:23) (71 - 82)  BP: 121/61 (23 Mar 2021 05:23) (121/61 - 136/63)  BP(mean): --  RR: 18 (23 Mar 2021 05:23) (18 - 18)  SpO2: --    LABS:                        8.1    4.31  )-----------( 174      ( 23 Mar 2021 06:22 )             25.3     03-23    140  |  105  |  61<HH>  ----------------------------<  88  4.8   |  20  |  4.5<HH>    Ca    8.5      23 Mar 2021 06:22  Phos  5.9     03-23  Mg     2.1     03-23    TPro  6.2  /  Alb  3.3<L>  /  TBili  0.8  /  DBili  x   /  AST  18  /  ALT  <5  /  AlkPhos  90  03-23                  RADIOLOGY:    PHYSICAL EXAM:  GENERAL: NAD, alert  HEENT:  Atraumatic, Normocephalic. EOMI,  PULMONARY: Clear to auscultation bilaterally; No wheeze  CARDIOVASCULAR: Regular rate and rhythm; No murmurs  GASTROINTESTINAL: Soft, Nontender, Nondistended; Bowel sounds present  MUSCULOSKELETAL:  No clubbing, cyanosis, or edema  NEUROLOGY: non-focal  SKIN: No rashes or lesions    ASSESSMENT & PLAN  75 year old male has medical history of CKD V, CAD s/p PCI and CABG 15+ years ago, DM, HLD admitted for left femur fracture secondary to mechanical fall due to slipping on wet floor    # S/p mechanical fall  # Left femur fracture  - Ortho consulted, Sx 3/20  - Patient is moderate to high risk, METS >4 ( hx of CAD, DM)  -C/w ancef 24 hrs  -PT eval  -Vit D low 14---started on supplement  -TSH wnl    # Lower extremity Edema  - Echo shows EF 20-25%, severe global hypokinesis  - patient on Lasix 80 mg    # CAD s/p PCI and CABG 15 + years  - patient not on aspirin  - c/w statin     # CKD V, Anemia of chronic  - renal following  - s/p 1 Unit of PRBC 3/22  - Keep Hb >8    # DM  - monitor fs  - c/w insulin regimen, adjust accordingly    #) MISC  Activity: IAT  DVT ppx: heparin subq  GI ppx: no need  Diet: DASH  Dispo: anticipate d/c tomorrow, work with PT today  Code: Full

## 2021-03-23 NOTE — PROGRESS NOTE ADULT - SUBJECTIVE AND OBJECTIVE BOX
ORTHO PROGRESS NOTE    Interval History: Patient seen and examined at bedside. Having some pain to R elbow. Working on digit ROM while in bed. No complaints of chest pain, SOB, N/V.    MEDICATIONS  (STANDING):  aspirin  chewable 81 milliGRAM(s) Oral daily  atorvastatin 10 milliGRAM(s) Oral at bedtime  calcitriol   Capsule 0.5 MICROGram(s) Oral daily  calcium acetate 667 milliGRAM(s) Oral three times a day with meals  dextrose 40% Gel 15 Gram(s) Oral once  dextrose 50% Injectable 25 Gram(s) IV Push once  dextrose 50% Injectable 12.5 Gram(s) IV Push once  dextrose 50% Injectable 25 Gram(s) IV Push once  heparin SubCutaneous Injection - Peds 5000 Unit(s) SubCutaneous every 8 hours  influenza   Vaccine 0.5 milliLiter(s) IntraMuscular once  insulin glargine Injectable (LANTUS) 10 Unit(s) SubCutaneous at bedtime  insulin lispro (ADMELOG) corrective regimen sliding scale   SubCutaneous three times a day before meals  insulin lispro Injectable (ADMELOG) 3 Unit(s) SubCutaneous three times a day before meals  melatonin 5 milliGRAM(s) Oral at bedtime  metoprolol succinate  milliGRAM(s) Oral daily  polyethylene glycol 3350 17 Gram(s) Oral daily  senna 2 Tablet(s) Oral at bedtime    MEDICATIONS  (PRN):  acetaminophen   Tablet .. 650 milliGRAM(s) Oral every 6 hours PRN Temp greater or equal to 38C (100.4F)  oxyCODONE    IR 5 milliGRAM(s) Oral every 4 hours PRN Severe Pain (7 - 10)    Vital Signs Last 24 Hrs  T(C): 36.8 (23 Mar 2021 05:23), Max: 37.5 (22 Mar 2021 14:01)  T(F): 98.2 (23 Mar 2021 05:23), Max: 99.5 (22 Mar 2021 14:01)  HR: 71 (23 Mar 2021 05:23) (71 - 82)  BP: 121/61 (23 Mar 2021 05:23) (121/61 - 136/63)  RR: 18 (23 Mar 2021 05:23) (18 - 18)    Physical Exam:   Dressing C/D/I. Splint intact.  Compartments soft and compressible  Motor intact distally  SILT distally  CR<2sec, palpable pulses                        8.1    4.31  )-----------( 174      ( 23 Mar 2021 06:22 )             25.3     136  |  101  |  61<HH>  ----------------------------<  113<H>  4.9   |  22  |  4.3<HH>    Ca    7.8<L>      22 Mar 2021 07:52  Mg     2.0     03-22  TPro  6.1  /  Alb  3.5  /  TBili  0.5  /  DBili  x   /  AST  17  /  ALT  <5  /  AlkPhos  84  03-22    A/P: 75M POD3 from left femur IMN.   - WBAT LLE  - DVT PPx  - Ancef renal dosing x24 hours post op  - PT/OT  - Hgb stable 8.1, received RBC yesterday ORTHO PROGRESS NOTE    Interval History: Patient seen and examined at bedside. No complaints of chest pain, SOB, N/V.    MEDICATIONS  (STANDING):  aspirin  chewable 81 milliGRAM(s) Oral daily  atorvastatin 10 milliGRAM(s) Oral at bedtime  calcitriol   Capsule 0.5 MICROGram(s) Oral daily  calcium acetate 667 milliGRAM(s) Oral three times a day with meals  dextrose 40% Gel 15 Gram(s) Oral once  dextrose 50% Injectable 25 Gram(s) IV Push once  dextrose 50% Injectable 12.5 Gram(s) IV Push once  dextrose 50% Injectable 25 Gram(s) IV Push once  heparin SubCutaneous Injection - Peds 5000 Unit(s) SubCutaneous every 8 hours  influenza   Vaccine 0.5 milliLiter(s) IntraMuscular once  insulin glargine Injectable (LANTUS) 10 Unit(s) SubCutaneous at bedtime  insulin lispro (ADMELOG) corrective regimen sliding scale   SubCutaneous three times a day before meals  insulin lispro Injectable (ADMELOG) 3 Unit(s) SubCutaneous three times a day before meals  melatonin 5 milliGRAM(s) Oral at bedtime  metoprolol succinate  milliGRAM(s) Oral daily  polyethylene glycol 3350 17 Gram(s) Oral daily  senna 2 Tablet(s) Oral at bedtime    MEDICATIONS  (PRN):  acetaminophen   Tablet .. 650 milliGRAM(s) Oral every 6 hours PRN Temp greater or equal to 38C (100.4F)  oxyCODONE    IR 5 milliGRAM(s) Oral every 4 hours PRN Severe Pain (7 - 10)    Vital Signs Last 24 Hrs  T(C): 36.8 (23 Mar 2021 05:23), Max: 37.5 (22 Mar 2021 14:01)  T(F): 98.2 (23 Mar 2021 05:23), Max: 99.5 (22 Mar 2021 14:01)  HR: 71 (23 Mar 2021 05:23) (71 - 82)  BP: 121/61 (23 Mar 2021 05:23) (121/61 - 136/63)  RR: 18 (23 Mar 2021 05:23) (18 - 18)    Physical Exam:   Dressing C/D/I. Splint intact.  Compartments soft and compressible  Motor intact distally  SILT distally  CR<2sec, palpable pulses                        8.1    4.31  )-----------( 174      ( 23 Mar 2021 06:22 )             25.3     136  |  101  |  61<HH>  ----------------------------<  113<H>  4.9   |  22  |  4.3<HH>    Ca    7.8<L>      22 Mar 2021 07:52  Mg     2.0     03-22  TPro  6.1  /  Alb  3.5  /  TBili  0.5  /  DBili  x   /  AST  17  /  ALT  <5  /  AlkPhos  84  03-22    A/P: 75M POD3 from left femur IMN.   - WBAT LLE  - DVT PPx  - Ancef renal dosing x24 hours post op  - PT/OT  - Hgb stable 8.1, received RBC yesterday ORTHO PROGRESS NOTE    Interval History: Patient seen and examined at bedside. No complaints of chest pain, SOB, N/V.    MEDICATIONS  (STANDING):  aspirin  chewable 81 milliGRAM(s) Oral daily  atorvastatin 10 milliGRAM(s) Oral at bedtime  calcitriol   Capsule 0.5 MICROGram(s) Oral daily  calcium acetate 667 milliGRAM(s) Oral three times a day with meals  dextrose 40% Gel 15 Gram(s) Oral once  dextrose 50% Injectable 25 Gram(s) IV Push once  dextrose 50% Injectable 12.5 Gram(s) IV Push once  dextrose 50% Injectable 25 Gram(s) IV Push once  heparin SubCutaneous Injection - Peds 5000 Unit(s) SubCutaneous every 8 hours  influenza   Vaccine 0.5 milliLiter(s) IntraMuscular once  insulin glargine Injectable (LANTUS) 10 Unit(s) SubCutaneous at bedtime  insulin lispro (ADMELOG) corrective regimen sliding scale   SubCutaneous three times a day before meals  insulin lispro Injectable (ADMELOG) 3 Unit(s) SubCutaneous three times a day before meals  melatonin 5 milliGRAM(s) Oral at bedtime  metoprolol succinate  milliGRAM(s) Oral daily  polyethylene glycol 3350 17 Gram(s) Oral daily  senna 2 Tablet(s) Oral at bedtime    MEDICATIONS  (PRN):  acetaminophen   Tablet .. 650 milliGRAM(s) Oral every 6 hours PRN Temp greater or equal to 38C (100.4F)  oxyCODONE    IR 5 milliGRAM(s) Oral every 4 hours PRN Severe Pain (7 - 10)    Vital Signs Last 24 Hrs  T(C): 36.8 (23 Mar 2021 05:23), Max: 37.5 (22 Mar 2021 14:01)  T(F): 98.2 (23 Mar 2021 05:23), Max: 99.5 (22 Mar 2021 14:01)  HR: 71 (23 Mar 2021 05:23) (71 - 82)  BP: 121/61 (23 Mar 2021 05:23) (121/61 - 136/63)  RR: 18 (23 Mar 2021 05:23) (18 - 18)    Physical Exam:   Dressing C/D/I  Compartments soft and compressible  Motor intact distally  SILT distally  CR<2sec, palpable pulses                        8.1    4.31  )-----------( 174      ( 23 Mar 2021 06:22 )             25.3     136  |  101  |  61<HH>  ----------------------------<  113<H>  4.9   |  22  |  4.3<HH>    Ca    7.8<L>      22 Mar 2021 07:52  Mg     2.0     03-22  TPro  6.1  /  Alb  3.5  /  TBili  0.5  /  DBili  x   /  AST  17  /  ALT  <5  /  AlkPhos  84  03-22    A/P: 75M POD3 from left femur IMN.   - WBAT LLE  - DVT PPx  - Ancef renal dosing x24 hours post op  - PT/OT  - Hgb stable 8.1, received RBC yesterday

## 2021-03-23 NOTE — PROGRESS NOTE ADULT - SUBJECTIVE AND OBJECTIVE BOX
WIBLERT JULIAN  75y  Male  ***My note supersedes ALL resident notes that I sign.  My corrections for their notes are in my note.***    I can be reached directly on [email or web address]. My office number is [phone]. My personal cell number is [phone].    INTERVAL EVENTS: Here for f/u of hip fx. Pt did stairs today. He is almost ready to go home, likely tomorrow. Still looking for pain control - I spoke w/ him and the RN together about what meds he is on for pain and when he can have them, hopefully, this will help.    T(F): 98 (03-23-21 @ 13:43), Max: 99.1 (03-22-21 @ 21:25)  HR: 72 (03-23-21 @ 13:43) (71 - 82)  BP: 117/60 (03-23-21 @ 13:43) (117/60 - 136/63)  RR: 18 (03-23-21 @ 13:43) (18 - 18)  SpO2: --    Gen: NAD  HEENT: PERRL, EOMI, mouth clr, nose clr  Neck: no nodes, no JVD, thyroid nl  lungs: clr  hrt: s1 s2 rrr no murmur  abd: soft, NT/ND, no HS megaly  ext: no edema, no c/c  lt hip: surg site OK  neuro: aa ox3, cn intact, can move all 4 ext    LABS:                      8.1     (    95.5   4.31  )-----------( ---------      174      ( 23 Mar 2021 06:22 )             25.3    (    14.0     140   (   105   (   88      03-23-21 @ 06:22  ----------------------               4.8   (   20   (   61                             -----                        4.5  Ca  8.5   Mg  2.1    P   5.9     LFT  6.2  (  0.8  (  18       03-23-21 @ 06:22  -------------------------  3.3  (  90  (  <5    CAPILLARY BLOOD GLUCOSE  POCT Blood Glucose.: 158 (03-23-21 @ 11:16)  POCT Blood Glucose.: 98 (03-23-21 @ 07:47)  POCT Blood Glucose.: 211 (03-22-21 @ 21:25)  POCT Blood Glucose.: 197 (03-22-21 @ 16:31)  POCT Blood Glucose.: 418 (03-22-21 @ 11:13)  POCT Blood Glucose.: 104 (03-22-21 @ 07:42)  POCT Blood Glucose.: 282 (03-21-21 @ 21:19)  POCT Blood Glucose.: 159 (03-21-21 @ 16:39)    RADIOLOGY & ADDITIONAL TESTS:      MEDICATIONS:    acetaminophen   Tablet .. 650 milliGRAM(s) Oral every 6 hours PRN  aspirin  chewable 81 milliGRAM(s) Oral daily  atorvastatin 10 milliGRAM(s) Oral at bedtime  calcitriol   Capsule 0.5 MICROGram(s) Oral daily  calcium acetate 667 milliGRAM(s) Oral three times a day with meals  heparin SubCutaneous Injection - Peds 5000 Unit(s) SubCutaneous every 8 hours  influenza   Vaccine 0.5 milliLiter(s) IntraMuscular once  insulin glargine Injectable (LANTUS) 10 Unit(s) SubCutaneous at bedtime  insulin lispro (ADMELOG) corrective regimen sliding scale   SubCutaneous three times a day before meals  insulin lispro Injectable (ADMELOG) 3 Unit(s) SubCutaneous three times a day before meals  melatonin 5 milliGRAM(s) Oral at bedtime  metoprolol succinate  milliGRAM(s) Oral daily  oxyCODONE    IR 5 milliGRAM(s) Oral every 4 hours PRN  polyethylene glycol 3350 17 Gram(s) Oral daily  senna 2 Tablet(s) Oral at bedtime

## 2021-03-24 ENCOUNTER — TRANSCRIPTION ENCOUNTER (OUTPATIENT)
Age: 76
End: 2021-03-24

## 2021-03-24 VITALS
RESPIRATION RATE: 18 BRPM | DIASTOLIC BLOOD PRESSURE: 68 MMHG | HEART RATE: 70 BPM | TEMPERATURE: 96 F | SYSTOLIC BLOOD PRESSURE: 137 MMHG

## 2021-03-24 LAB
ALBUMIN SERPL ELPH-MCNC: 3 G/DL — LOW (ref 3.5–5.2)
ALP SERPL-CCNC: 78 U/L — SIGNIFICANT CHANGE UP (ref 30–115)
ALT FLD-CCNC: <5 U/L — SIGNIFICANT CHANGE UP (ref 0–41)
ANION GAP SERPL CALC-SCNC: 12 MMOL/L — SIGNIFICANT CHANGE UP (ref 7–14)
AST SERPL-CCNC: 15 U/L — SIGNIFICANT CHANGE UP (ref 0–41)
BILIRUB SERPL-MCNC: 0.6 MG/DL — SIGNIFICANT CHANGE UP (ref 0.2–1.2)
BLD GP AB SCN SERPL QL: SIGNIFICANT CHANGE UP
BUN SERPL-MCNC: 63 MG/DL — CRITICAL HIGH (ref 10–20)
CALCIUM SERPL-MCNC: 8.2 MG/DL — LOW (ref 8.5–10.1)
CHLORIDE SERPL-SCNC: 104 MMOL/L — SIGNIFICANT CHANGE UP (ref 98–110)
CO2 SERPL-SCNC: 17 MMOL/L — SIGNIFICANT CHANGE UP (ref 17–32)
CREAT SERPL-MCNC: 4.2 MG/DL — CRITICAL HIGH (ref 0.7–1.5)
GLUCOSE BLDC GLUCOMTR-MCNC: 115 MG/DL — HIGH (ref 70–99)
GLUCOSE BLDC GLUCOMTR-MCNC: 143 MG/DL — HIGH (ref 70–99)
GLUCOSE BLDC GLUCOMTR-MCNC: 177 MG/DL — HIGH (ref 70–99)
GLUCOSE SERPL-MCNC: 128 MG/DL — HIGH (ref 70–99)
HCT VFR BLD CALC: 22.9 % — LOW (ref 42–52)
HGB BLD-MCNC: 7.5 G/DL — LOW (ref 14–18)
MCHC RBC-ENTMCNC: 30.6 PG — SIGNIFICANT CHANGE UP (ref 27–31)
MCHC RBC-ENTMCNC: 32.8 G/DL — SIGNIFICANT CHANGE UP (ref 32–37)
MCV RBC AUTO: 93.5 FL — SIGNIFICANT CHANGE UP (ref 80–94)
NRBC # BLD: 0 /100 WBCS — SIGNIFICANT CHANGE UP (ref 0–0)
PLATELET # BLD AUTO: 167 K/UL — SIGNIFICANT CHANGE UP (ref 130–400)
POTASSIUM SERPL-MCNC: 5.2 MMOL/L — HIGH (ref 3.5–5)
POTASSIUM SERPL-SCNC: 5.2 MMOL/L — HIGH (ref 3.5–5)
PROT SERPL-MCNC: 5.6 G/DL — LOW (ref 6–8)
RBC # BLD: 2.45 M/UL — LOW (ref 4.7–6.1)
RBC # FLD: 13.2 % — SIGNIFICANT CHANGE UP (ref 11.5–14.5)
SODIUM SERPL-SCNC: 133 MMOL/L — LOW (ref 135–146)
WBC # BLD: 3.31 K/UL — LOW (ref 4.8–10.8)
WBC # FLD AUTO: 3.31 K/UL — LOW (ref 4.8–10.8)

## 2021-03-24 PROCEDURE — 99239 HOSP IP/OBS DSCHRG MGMT >30: CPT

## 2021-03-24 RX ORDER — ASPIRIN/CALCIUM CARB/MAGNESIUM 324 MG
1 TABLET ORAL
Qty: 0 | Refills: 0 | DISCHARGE
Start: 2021-03-24

## 2021-03-24 RX ORDER — RIVAROXABAN 15 MG-20MG
1 KIT ORAL
Qty: 30 | Refills: 0
Start: 2021-03-24 | End: 2021-04-22

## 2021-03-24 RX ORDER — FERROUS SULFATE 325(65) MG
1 TABLET ORAL
Qty: 0 | Refills: 0 | DISCHARGE
Start: 2021-03-24

## 2021-03-24 RX ORDER — CALCITRIOL 0.5 UG/1
1 CAPSULE ORAL
Qty: 0 | Refills: 0 | DISCHARGE
Start: 2021-03-24

## 2021-03-24 RX ORDER — SODIUM BICARBONATE 1 MEQ/ML
1 SYRINGE (ML) INTRAVENOUS
Qty: 0 | Refills: 0 | DISCHARGE

## 2021-03-24 RX ORDER — FUROSEMIDE 40 MG
1 TABLET ORAL
Qty: 0 | Refills: 0 | DISCHARGE

## 2021-03-24 RX ORDER — CALCITRIOL 0.5 UG/1
2 CAPSULE ORAL
Qty: 0 | Refills: 0 | DISCHARGE
Start: 2021-03-24

## 2021-03-24 RX ORDER — SODIUM BICARBONATE 1 MEQ/ML
650 SYRINGE (ML) INTRAVENOUS THREE TIMES A DAY
Refills: 0 | Status: DISCONTINUED | OUTPATIENT
Start: 2021-03-24 | End: 2021-03-24

## 2021-03-24 RX ORDER — FERROUS SULFATE 325(65) MG
325 TABLET ORAL DAILY
Refills: 0 | Status: DISCONTINUED | OUTPATIENT
Start: 2021-03-24 | End: 2021-03-24

## 2021-03-24 RX ORDER — SODIUM BICARBONATE 1 MEQ/ML
1 SYRINGE (ML) INTRAVENOUS
Qty: 0 | Refills: 0 | DISCHARGE
Start: 2021-03-24

## 2021-03-24 RX ORDER — ATORVASTATIN CALCIUM 80 MG/1
1 TABLET, FILM COATED ORAL
Qty: 0 | Refills: 0 | DISCHARGE
Start: 2021-03-24

## 2021-03-24 RX ORDER — ACETAMINOPHEN 500 MG
2 TABLET ORAL
Qty: 40 | Refills: 0
Start: 2021-03-24 | End: 2021-03-28

## 2021-03-24 RX ORDER — SODIUM BICARBONATE 1 MEQ/ML
2 SYRINGE (ML) INTRAVENOUS
Qty: 0 | Refills: 0 | DISCHARGE
Start: 2021-03-24

## 2021-03-24 RX ORDER — OXYCODONE HYDROCHLORIDE 5 MG/1
1 TABLET ORAL
Qty: 42 | Refills: 0
Start: 2021-03-24 | End: 2021-03-30

## 2021-03-24 RX ADMIN — Medication 3 UNIT(S): at 17:15

## 2021-03-24 RX ADMIN — Medication 3 UNIT(S): at 11:42

## 2021-03-24 RX ADMIN — HEPARIN SODIUM 5000 UNIT(S): 5000 INJECTION INTRAVENOUS; SUBCUTANEOUS at 05:08

## 2021-03-24 RX ADMIN — Medication 100 MILLIGRAM(S): at 05:08

## 2021-03-24 RX ADMIN — Medication 650 MILLIGRAM(S): at 13:09

## 2021-03-24 RX ADMIN — Medication 325 MILLIGRAM(S): at 11:36

## 2021-03-24 RX ADMIN — Medication 2: at 11:42

## 2021-03-24 RX ADMIN — OXYCODONE HYDROCHLORIDE 5 MILLIGRAM(S): 5 TABLET ORAL at 05:37

## 2021-03-24 RX ADMIN — CALCITRIOL 0.5 MICROGRAM(S): 0.5 CAPSULE ORAL at 11:37

## 2021-03-24 RX ADMIN — Medication 81 MILLIGRAM(S): at 11:36

## 2021-03-24 RX ADMIN — POLYETHYLENE GLYCOL 3350 17 GRAM(S): 17 POWDER, FOR SOLUTION ORAL at 11:36

## 2021-03-24 RX ADMIN — Medication 650 MILLIGRAM(S): at 05:08

## 2021-03-24 RX ADMIN — Medication 667 MILLIGRAM(S): at 11:35

## 2021-03-24 RX ADMIN — OXYCODONE HYDROCHLORIDE 5 MILLIGRAM(S): 5 TABLET ORAL at 11:38

## 2021-03-24 RX ADMIN — OXYCODONE HYDROCHLORIDE 5 MILLIGRAM(S): 5 TABLET ORAL at 12:10

## 2021-03-24 RX ADMIN — Medication 3 UNIT(S): at 08:28

## 2021-03-24 NOTE — PROGRESS NOTE ADULT - PROVIDER SPECIALTY LIST ADULT
Internal Medicine
Nephrology
Internal Medicine
Orthopedics
Hospitalist
Internal Medicine
Nephrology
Orthopedics
Orthopedics
Internal Medicine

## 2021-03-24 NOTE — DISCHARGE NOTE PROVIDER - HOSPITAL COURSE
75 year old male has medical history of CKD V, CAD s/p PCI and CABG 15+ years ago, DM, Lower extremity edema and HLD presented with mechanical fall. Patient slipped on wet floor lost balance, denies hitting head, dizziness or loss of consciousness. Patient was admitted for intertrochanteric femoral fracture s/p left ORIF on 3/20/202 and completed course of ancef. Patient was working with physical therapy and requiring assistane as he was having pain. Pain adequately controlled now and is to work with physical therapy outpatient. Repeat hemoglobin was 7.5 and given hx of CAD, he was transfused 1 unit PRBCs. During hospital course, patient was also followed up by nephrology for CKD 4, hypocalcemia, hyperphosphatemia, vit d deficiency; secondary parathyroidism. to continue with phoslo 1 tab and calcitriol 0.5mcg daily with po iron supplementation.    Patient is now hemodynamically stable for discharge.    75 year old male has medical history of CKD V, CAD s/p PCI and CABG 15+ years ago, DM, Lower extremity edema and HLD presented with mechanical fall. Patient slipped on wet floor lost balance, denies hitting head, dizziness or loss of consciousness. Patient was admitted for intertrochanteric femoral fracture s/p left ORIF on 3/20/202 and completed course of ancef. Patient was working with physical therapy and requiring assistane as he was having pain. Pain adequately controlled now and is to work with physical therapy outpatient. Repeat hemoglobin was 7.5 and given hx of CAD, he was transfused 1 unit PRBCs. During hospital course, patient was also followed up by nephrology for CKD 4, hypocalcemia, hyperphosphatemia, vit d deficiency; secondary parathyroidism. to continue with phoslo 1 tab and calcitriol 0.5mcg daily with po iron supplementation. Patient is now hemodynamically stable for discharge.

## 2021-03-24 NOTE — DISCHARGE NOTE PROVIDER - NSDCCPCAREPLAN_GEN_ALL_CORE_FT
PRINCIPAL DISCHARGE DIAGNOSIS  Diagnosis: Closed nondisplaced intertrochanteric fracture of left femur, initial encounter  Assessment and Plan of Treatment: you were admitted after a fall in which you had a left hip fracture and had a procedure done to fix it. Please follow up with your orthopedic doctor in 1-2 weeks. Please work with physical therapist to help improve your walking.      SECONDARY DISCHARGE DIAGNOSES  Diagnosis: CKD (chronic kidney disease), stage V  Assessment and Plan of Treatment: Please follow up with your nephrology (kidney) doctor in 1-2 weeks and take all your prescribed medications.

## 2021-03-24 NOTE — PROGRESS NOTE ADULT - SUBJECTIVE AND OBJECTIVE BOX
IESHA WILBERT  75y  Male  ***My note supersedes ALL resident notes that I sign.  My corrections for their notes are in my note.***    I can be reached directly on [email or web address]. My office number is [phone]. My personal cell number is [phone].    INTERVAL EVENTS: Here for f/u of hip fx. Pt walking fairly well w/ walker w/ PT. He moves fairly well, but has pain w/ mvmt. He uses walker correctly and well. Pt still prefers to go home.    T(F): 96.3 (03-24-21 @ 16:11), Max: 100.1 (03-24-21 @ 04:51)  HR: 70 (03-24-21 @ 16:11) (70 - 79)  BP: 137/68 (03-24-21 @ 16:11) (106/56 - 145/68)  RR: 18 (03-24-21 @ 16:11) (18 - 18)  SpO2: 91% (03-24-21 @ 07:50) (91% - 91%)    Gen: NAD  HEENT: PERRL, EOMI, mouth clr, nose clr  Neck: no nodes, no JVD, thyroid nl  lungs: clr  hrt: s1 s2 rrr no murmur  abd: soft, NT/ND, no HS megaly  ext: no edema, no c/c; surg site OK (no bleeding)  neuro: aa ox3, cn intact, can move all 4 ext    LABS:                      7.5     (    93.5   3.31  )-----------( ---------      167      ( 24 Mar 2021 06:41 )             22.9    (    13.2     133   (   104   (   128      03-24-21 @ 06:41  ----------------------               5.2   (   17   (   63                             -----                        4.2  Ca  8.2   Mg  --    P   --     LFT  5.6  (  0.6  (  15       03-24-21 @ 06:41  -------------------------  3.0  (  78  (  <5    CAPILLARY BLOOD GLUCOSE  POCT Blood Glucose.: 177 (03-24-21 @ 11:31)  POCT Blood Glucose.: 143 (03-24-21 @ 07:44)  POCT Blood Glucose.: 118 (03-23-21 @ 21:17)  POCT Blood Glucose.: 178 (03-23-21 @ 16:46)  POCT Blood Glucose.: 158 (03-23-21 @ 11:16)  POCT Blood Glucose.: 98 (03-23-21 @ 07:47)  POCT Blood Glucose.: 211 (03-22-21 @ 21:25)  POCT Blood Glucose.: 197 (03-22-21 @ 16:31)    RADIOLOGY & ADDITIONAL TESTS:      MEDICATIONS:    acetaminophen   Tablet .. 650 milliGRAM(s) Oral every 6 hours PRN  aspirin  chewable 81 milliGRAM(s) Oral daily  atorvastatin 10 milliGRAM(s) Oral at bedtime  calcitriol   Capsule 0.5 MICROGram(s) Oral daily  calcium acetate 667 milliGRAM(s) Oral three times a day with meals  ferrous    sulfate 325 milliGRAM(s) Oral daily  heparin SubCutaneous Injection - Peds 5000 Unit(s) SubCutaneous every 8 hours  influenza   Vaccine 0.5 milliLiter(s) IntraMuscular once  insulin glargine Injectable (LANTUS) 10 Unit(s) SubCutaneous at bedtime  insulin lispro (ADMELOG) corrective regimen sliding scale   SubCutaneous three times a day before meals  insulin lispro Injectable (ADMELOG) 3 Unit(s) SubCutaneous three times a day before meals  melatonin 5 milliGRAM(s) Oral at bedtime  metoprolol succinate  milliGRAM(s) Oral daily  oxyCODONE    IR 5 milliGRAM(s) Oral every 4 hours PRN  polyethylene glycol 3350 17 Gram(s) Oral daily  senna 2 Tablet(s) Oral at bedtime  sodium bicarbonate 650 milliGRAM(s) Oral three times a day

## 2021-03-24 NOTE — DISCHARGE NOTE PROVIDER - CARE PROVIDER_API CALL
Raheem forbes  Phone: [phone]  Fax: (   )    -  Established Patient  Follow Up Time: 1 week    Bienvenido Carrillo)  Orthopaedic Surgery Surgery  [address] [place] [address]  Phone: [phone]  Fax: [phone]  Established Patient  Follow Up Time: 1 week

## 2021-03-24 NOTE — PROGRESS NOTE ADULT - ASSESSMENT
75 year old man has medical history of CKD V, CAD s/p PCI and CABG 15+ years ago, DM, Lower extremity edema and HLD presented with mechanical fall. Patient slipped on wet floor lost balance, denies hitting head, dizziness or loss of consciousness.     # Left intertrochanteric hip fx 2/2 fall at home; s/p ORIF 3/20  completed 24hrs abx ppx (d/c ancef)  DVT ppx  PT f/u  Pain: oxy IR 5mg po q4 prn pain + bowel reg (sent Rx for 1 wk supply to pharmacy)  doubt temp 100.1F constitutes infection - cont to follow off abx    # CKD 5 2/2 DM/HTN  base Cr 5  nephro noted    # hypocalcemia; hyperphosphatemia (mild); Vit D Deficiency; secondary hyperparathyroidism  iPTH 320  Phos 5.8  25 OH Vit D 14  Ca 7.8 (alb 3.5)  spoke w/ renal: d/c cholecalciferol and sevelamer  give:  calcitriol 0.5mcg po q24 and  phoslo 667mg po qac    # anemia is acute from blood loss in OR and chronic from CKD  would keep hgb >8 (CAD hx) -> give 1u PRBC today prior to d/c  FeSO4 325mg po q24 + MVI po q24  outpt f/u w/ renal to recheck iron stores and then start Procrit    # CAD s/p PCI and CABG; DLD  asa 81mg po q24  cont statin  cont BB    # DM - control improving  diabetic diet  FS qac/hs  lantus 10 HS and lisp 3/meal   upon d/c, Tradjenta 5mg po q24    # Mild aortic regurgitation; Mild pulmonic valve regurgitation; Chronic diastolic and systolic congestive heart failure   all stable  outpt f/u w/ cardio    # Insomnia on melatonin     # DVT ppx: hep sc    # GI ppx: none    # Activity: WBAT w/ asst and walker; PT f/u; pt will go home w/ home PT (does NOT need STR @ SNF)    Dispo: tx DM; tx pain; PT f/u; give 1u PRBC; d/c home today 75 year old man has medical history of CKD V, CAD s/p PCI and CABG 15+ years ago, DM, Lower extremity edema and HLD presented with mechanical fall. Patient slipped on wet floor lost balance, denies hitting head, dizziness or loss of consciousness.     # Left intertrochanteric hip fx 2/2 fall at home; s/p ORIF 3/20  completed 24hrs abx ppx (d/c ancef)  DVT ppx (d/w ortho team): pt is on baby asa; add xarelto 10mg po q24 for 1 mo  PT f/u  Pain: oxy IR 5mg po q4 prn pain + bowel reg (sent Rx for 1 wk supply to pharmacy)  doubt temp 100.1F constitutes infection - cont to follow off abx    # CKD 5 2/2 DM/HTN  base Cr 5  nephro noted    # hypocalcemia; hyperphosphatemia (mild); Vit D Deficiency; secondary hyperparathyroidism  iPTH 320  Phos 5.8  25 OH Vit D 14  Ca 7.8 (alb 3.5)  spoke w/ renal: d/c cholecalciferol and sevelamer  give:  calcitriol 0.5mcg po q24 and  phoslo 667mg po qac    # anemia is acute from blood loss in OR and chronic from CKD  would keep hgb >8 (CAD hx) -> give 1u PRBC today prior to d/c  FeSO4 325mg po q24 + MVI po q24  outpt f/u w/ renal to recheck iron stores and then start Procrit    # CAD s/p PCI and CABG; DLD  asa 81mg po q24  cont statin  cont BB    # DM - control improving  diabetic diet  FS qac/hs  lantus 10 HS and lisp 3/meal   upon d/c, Tradjenta 5mg po q24    # Mild aortic regurgitation; Mild pulmonic valve regurgitation; Chronic diastolic and systolic congestive heart failure   all stable  outpt f/u w/ cardio    # Insomnia on melatonin     # DVT ppx: hep sc (xarelto 10mg po q24 upon d/c for 1mo)    # GI ppx: none    # Activity: WBAT w/ asst and walker; PT f/u; pt will go home w/ home PT (does NOT need STR @ SNF)    Dispo: tx DM; tx pain; PT f/u; give 1u PRBC; d/c home today

## 2021-03-24 NOTE — DISCHARGE NOTE PROVIDER - PROVIDER TOKENS
FREE:[LAST:[plueger],FIRST:[Raheem],PHONE:[phone]],FAX:[(   )    -],FOLLOWUP:[1 week],ESTABLISHEDPATIENT:[T]],PROVIDER:[TOKEN:[[id number]],FOLLOWUP:[1 week],ESTABLISHEDPATIENT:[T]]

## 2021-03-24 NOTE — PROGRESS NOTE ADULT - SUBJECTIVE AND OBJECTIVE BOX
ORTHO PROGRESS NOTE    Interval History: Patient seen and examined at bedside. The patient is awake and alert in NAD. No complaints of chest pain, SOB, N/V.    Vital Signs Last 24 Hrs  T(C): 37.8 (24 Mar 2021 04:51), Max: 37.8 (24 Mar 2021 04:51)  T(F): 100.1 (24 Mar 2021 04:51), Max: 100.1 (24 Mar 2021 04:51)  HR: 79 (24 Mar 2021 04:51) (72 - 79)  BP: 106/56 (24 Mar 2021 04:51) (106/56 - 145/68)  RR: 18 (24 Mar 2021 04:51) (18 - 18)    Physical Exam:   Dressing C/D/I   Compartments soft and compressible  Motor intact distally  SILT distally  CR<2sec, palpable pulses                        8.1    4.31  )-----------( 174      ( 23 Mar 2021 06:22 )             25.3     140  |  105  |  61<HH>  ----------------------------<  88  4.8   |  20  |  4.5<HH>    Ca    8.5      23 Mar 2021 06:22  Phos  5.9     03-23  Mg     2.1     03-23  TPro  6.2  /  Alb  3.3<L>  /  TBili  0.8  /  DBili  x   /  AST  18  /  ALT  <5  /  AlkPhos  90  03-23    A/P: 75M POD4 from left femur IMN.   - WBAT LLE  - DVT PPx  - Ancef renal dosing x24 hours post op  - PT/OT  - Hgb stable

## 2021-03-24 NOTE — PROGRESS NOTE ADULT - TIME BILLING
d/c planning and outpt f/u for mult med prob    home PT set up    med rec w/ pt and resident    sending Rx

## 2021-03-24 NOTE — PROGRESS NOTE ADULT - REASON FOR ADMISSION
Left femoral fracture secondary to mechanical fall

## 2021-03-24 NOTE — PROGRESS NOTE ADULT - SUBJECTIVE AND OBJECTIVE BOX
Nephrology progress note    THIS IS AN INCOMPLETE NOTE . FULL NOTE TO FOLLOW SHORTLY    Patient is seen and examined, events over the last 24 h noted .    Allergies:  No Known Allergies    Hospital Medications:   MEDICATIONS  (STANDING):  aspirin  chewable 81 milliGRAM(s) Oral daily  atorvastatin 10 milliGRAM(s) Oral at bedtime  calcitriol   Capsule 0.5 MICROGram(s) Oral daily  calcium acetate 667 milliGRAM(s) Oral three times a day with meals  dextrose 40% Gel 15 Gram(s) Oral once  dextrose 50% Injectable 25 Gram(s) IV Push once  dextrose 50% Injectable 12.5 Gram(s) IV Push once  dextrose 50% Injectable 25 Gram(s) IV Push once  ferrous    sulfate 325 milliGRAM(s) Oral daily  heparin SubCutaneous Injection - Peds 5000 Unit(s) SubCutaneous every 8 hours  influenza   Vaccine 0.5 milliLiter(s) IntraMuscular once  insulin glargine Injectable (LANTUS) 10 Unit(s) SubCutaneous at bedtime  insulin lispro (ADMELOG) corrective regimen sliding scale   SubCutaneous three times a day before meals  insulin lispro Injectable (ADMELOG) 3 Unit(s) SubCutaneous three times a day before meals  melatonin 5 milliGRAM(s) Oral at bedtime  metoprolol succinate  milliGRAM(s) Oral daily  polyethylene glycol 3350 17 Gram(s) Oral daily  senna 2 Tablet(s) Oral at bedtime  sodium bicarbonate 650 milliGRAM(s) Oral three times a day        VITALS:  T(F): 100.1 (03-24-21 @ 04:51), Max: 100.1 (03-24-21 @ 04:51)  HR: 79 (03-24-21 @ 04:51)  BP: 106/56 (03-24-21 @ 04:51)  RR: 18 (03-24-21 @ 04:51)  SpO2: 91% (03-24-21 @ 07:50)  Wt(kg): --    03-22 @ 07:01  -  03-23 @ 07:00  --------------------------------------------------------  IN: 200 mL / OUT: 750 mL / NET: -550 mL    03-23 @ 07:01  -  03-24 @ 07:00  --------------------------------------------------------  IN: 100 mL / OUT: 1300 mL / NET: -1200 mL          PHYSICAL EXAM:  Constitutional: NAD  HEENT: anicteric sclera, oropharynx clear, MMM  Neck: No JVD  Respiratory: CTAB, no wheezes, rales or rhonchi  Cardiovascular: S1, S2, RRR  Gastrointestinal: BS+, soft, NT/ND  Extremities: No cyanosis or clubbing. No peripheral edema  :  No franco.   Skin: No rashes    LABS:  03-24    133<L>  |  104  |  63<HH>  ----------------------------<  128<H>  5.2<H>   |  17  |  4.2<HH>    Ca    8.2<L>      24 Mar 2021 06:41  Phos  5.9     03-23  Mg     2.1     03-23    TPro  5.6<L>  /  Alb  3.0<L>  /  TBili  0.6  /  DBili      /  AST  15  /  ALT  <5  /  AlkPhos  78  03-24                          7.5    3.31  )-----------( 167      ( 24 Mar 2021 06:41 )             22.9       Urine Studies:      RADIOLOGY & ADDITIONAL STUDIES:   Nephrology progress note  Patient is seen and examined, events over the last 24 h noted .  lying in bed comfortable  complained of slight left hip pain     Allergies:  No Known Allergies    Hospital Medications:   MEDICATIONS  (STANDING):  aspirin  chewable 81 milliGRAM(s) Oral daily  atorvastatin 10 milliGRAM(s) Oral at bedtime  calcitriol   Capsule 0.5 MICROGram(s) Oral daily  calcium acetate 667 milliGRAM(s) Oral three times a day with meals  ferrous    sulfate 325 milliGRAM(s) Oral daily  heparin SubCutaneous Injection - Peds 5000 Unit(s) SubCutaneous every 8 hours  influenza   Vaccine 0.5 milliLiter(s) IntraMuscular once  insulin glargine Injectable (LANTUS) 10 Unit(s) SubCutaneous at bedtime  insulin lispro (ADMELOG) corrective regimen sliding scale   SubCutaneous three times a day before meals  insulin lispro Injectable (ADMELOG) 3 Unit(s) SubCutaneous three times a day before meals  melatonin 5 milliGRAM(s) Oral at bedtime  metoprolol succinate  milliGRAM(s) Oral daily  polyethylene glycol 3350 17 Gram(s) Oral daily  senna 2 Tablet(s) Oral at bedtime  sodium bicarbonate 650 milliGRAM(s) Oral three times a day        VITALS:  T(F): 100.1 (03-24-21 @ 04:51), Max: 100.1 (03-24-21 @ 04:51)  HR: 79 (03-24-21 @ 04:51)  BP: 106/56 (03-24-21 @ 04:51)  RR: 18 (03-24-21 @ 04:51)  SpO2: 91% (03-24-21 @ 07:50)      03-22 @ 07:01  -  03-23 @ 07:00  --------------------------------------------------------  IN: 200 mL / OUT: 750 mL / NET: -550 mL    03-23 @ 07:01  -  03-24 @ 07:00  --------------------------------------------------------  IN: 100 mL / OUT: 1300 mL / NET: -1200 mL          PHYSICAL EXAM:  Constitutional: NAD  Respiratory: CTAB,  Cardiovascular: S1, S2, RRR  Gastrointestinal: BS+, soft, NT/ND  Extremities: No cyanosis or clubbing. No peripheral edema  Skin: No rashes    LABS:  Creatinine Trend: 4.2<--, 4.5<--, 4.3<--, 4.7<--, 4.8<--, 4.3<--  03-24    133<L>  |  104  |  63<HH>  ----------------------------<  128<H>  5.2<H>   |  17  |  4.2<HH>    Ca    8.2<L>      24 Mar 2021 06:41  Phos  5.9     03-23  Mg     2.1     03-23    Iron with Total Binding Capacity in AM (03.22.21 @ 07:52)    Iron - Total Binding Capacity.: 187 ug/dL    % Saturation, Iron: 12 %    Iron Total, Serum: 22 ug/dL    Unsaturated Iron Binding Capacity: 165 ug/dL      TPro  5.6<L>  /  Alb  3.0<L>  /  TBili  0.6  /  DBili      /  AST  15  /  ALT  <5  /  AlkPhos  78  03-24                          7.5    3.31  )-----------( 167      ( 24 Mar 2021 06:41 )             22.9       Urine Studies:      RADIOLOGY & ADDITIONAL STUDIES:

## 2021-03-24 NOTE — DISCHARGE NOTE PROVIDER - NSDCMRMEDTOKEN_GEN_ALL_CORE_FT
aspirin 81 mg oral tablet, chewable: 1 tab(s) orally once a day  atorvastatin 10 mg oral tablet: 1 tab(s) orally once a day (at bedtime)  calcitriol 0.5 mcg oral capsule: 1 cap(s) orally once a day  ferrous sulfate 325 mg (65 mg elemental iron) oral tablet: 1 tab(s) orally once a day  Procrit:   Renvela 800 mg oral tablet: 1 tab(s) orally 3 times a day (with meals)  sodium bicarbonate 650 mg oral tablet: 1 tab(s) orally 3 times a day  Toprol- mg oral tablet, extended release: 1 tab(s) orally once a day  Vitamin D2 50,000 intl units (1.25 mg) oral capsule (obsolete): 1 cap(s) orally once a day   aspirin 81 mg oral tablet, chewable: 1 tab(s) orally once a day  atorvastatin 10 mg oral tablet: 1 tab(s) orally once a day (at bedtime)  calcitriol 0.5 mcg oral capsule: 1 cap(s) orally once a day  ferrous sulfate 325 mg (65 mg elemental iron) oral tablet: 1 tab(s) orally once a day  Procrit:   Renvela 800 mg oral tablet: 1 tab(s) orally 3 times a day (with meals)  sodium bicarbonate 650 mg oral tablet: 1 tab(s) orally 3 times a day  Toprol- mg oral tablet, extended release: 1 tab(s) orally once a day  Tylenol Regular Strength 325 mg oral tablet: 2 tab(s) orally every 6 hours, As Needed -for moderate pain   Vitamin D2 50,000 intl units (1.25 mg) oral capsule (obsolete): 1 cap(s) orally once a day   aspirin 81 mg oral tablet, chewable: 1 tab(s) orally once a day  atorvastatin 10 mg oral tablet: 1 tab(s) orally once a day (at bedtime)  calcitriol 0.5 mcg oral capsule: 1 cap(s) orally once a day  ferrous sulfate 325 mg (65 mg elemental iron) oral tablet: 1 tab(s) orally once a day  Multiple Vitamins oral capsule: 1 cap(s) orally once a day   Procrit:   Renvela 800 mg oral tablet: 1 tab(s) orally 3 times a day (with meals)  sodium bicarbonate 650 mg oral tablet: 1 tab(s) orally 3 times a day  Toprol- mg oral tablet, extended release: 1 tab(s) orally once a day  Tylenol Regular Strength 325 mg oral tablet: 2 tab(s) orally every 6 hours, As Needed -for moderate pain   Vitamin D2 50,000 intl units (1.25 mg) oral capsule (obsolete): 1 cap(s) orally once a day  Xarelto 10 mg oral tablet: 1 tab(s) orally once a day

## 2021-03-24 NOTE — PROGRESS NOTE ADULT - ASSESSMENT
75M with PMH of CKD 5 (follows with Dr. Driscoll at [facility]), CAD s/p PCI/CABG, DM2, HLD, prostate cancer s/p prostatectomy, admitted for L intertrochanteric femoral fx d/t mechanical fall    # CKD V, likely due to DM/HTN  # CKD-MBD, hyperphos/hypocalcemia  # Low anion gap metabolic acidosis d/t renal failure  # Normocytic anemia due to advanced CKD,  planned for transfusion   # L intertrochanteric hip fx  - does not have access for RRT  - non oliguric, check Bladder SONO for PVR  - creatinine stable / slightly better   -  on phoslo 1 tab qac check intact PTH level,  continue calcitriol 0.5mcg daily/ IP and Ca noted   - iron stores noted start venofer 100 mg IV q24h x10 / will need GUCCI after Fe repletion   - appreciate ortho f/u  - no acute indication for RRT   -  will follow

## 2021-03-25 RX ORDER — LINAGLIPTIN 5 MG/1
1 TABLET, FILM COATED ORAL
Qty: 30 | Refills: 0
Start: 2021-03-25 | End: 2021-04-23

## 2021-04-05 DIAGNOSIS — S72.142A DISPLACED INTERTROCHANTERIC FRACTURE OF LEFT FEMUR, INITIAL ENCOUNTER FOR CLOSED FRACTURE: ICD-10-CM

## 2021-04-05 DIAGNOSIS — N25.81 SECONDARY HYPERPARATHYROIDISM OF RENAL ORIGIN: ICD-10-CM

## 2021-04-05 DIAGNOSIS — N18.5 CHRONIC KIDNEY DISEASE, STAGE 5: ICD-10-CM

## 2021-04-05 DIAGNOSIS — Z87.891 PERSONAL HISTORY OF NICOTINE DEPENDENCE: ICD-10-CM

## 2021-04-05 DIAGNOSIS — I25.5 ISCHEMIC CARDIOMYOPATHY: ICD-10-CM

## 2021-04-05 DIAGNOSIS — I25.10 ATHEROSCLEROTIC HEART DISEASE OF NATIVE CORONARY ARTERY WITHOUT ANGINA PECTORIS: ICD-10-CM

## 2021-04-05 DIAGNOSIS — E83.51 HYPOCALCEMIA: ICD-10-CM

## 2021-04-05 DIAGNOSIS — E78.5 HYPERLIPIDEMIA, UNSPECIFIED: ICD-10-CM

## 2021-04-05 DIAGNOSIS — D63.1 ANEMIA IN CHRONIC KIDNEY DISEASE: ICD-10-CM

## 2021-04-05 DIAGNOSIS — E11.65 TYPE 2 DIABETES MELLITUS WITH HYPERGLYCEMIA: ICD-10-CM

## 2021-04-05 DIAGNOSIS — G47.00 INSOMNIA, UNSPECIFIED: ICD-10-CM

## 2021-04-05 DIAGNOSIS — Z98.61 CORONARY ANGIOPLASTY STATUS: ICD-10-CM

## 2021-04-05 DIAGNOSIS — E55.9 VITAMIN D DEFICIENCY, UNSPECIFIED: ICD-10-CM

## 2021-04-05 DIAGNOSIS — M25.552 PAIN IN LEFT HIP: ICD-10-CM
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
## 2021-04-05 DIAGNOSIS — Z85.46 PERSONAL HISTORY OF MALIGNANT NEOPLASM OF PROSTATE: ICD-10-CM
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
## 2021-04-05 DIAGNOSIS — I35.1 NONRHEUMATIC AORTIC (VALVE) INSUFFICIENCY: ICD-10-CM

## 2021-04-05 DIAGNOSIS — I13.2 HYPERTENSIVE HEART AND CHRONIC KIDNEY DISEASE WITH HEART FAILURE AND WITH STAGE 5 CHRONIC KIDNEY DISEASE, OR END STAGE RENAL DISEASE: ICD-10-CM

## 2021-04-05 DIAGNOSIS — E83.39 OTHER DISORDERS OF PHOSPHORUS METABOLISM: ICD-10-CM

## 2021-04-05 DIAGNOSIS — I50.42 CHRONIC COMBINED SYSTOLIC (CONGESTIVE) AND DIASTOLIC (CONGESTIVE) HEART FAILURE: ICD-10-CM

## 2021-04-05 DIAGNOSIS — E87.2 ACIDOSIS: ICD-10-CM

## 2021-04-05 DIAGNOSIS — I37.1 NONRHEUMATIC PULMONARY VALVE INSUFFICIENCY: ICD-10-CM

## 2021-04-05 DIAGNOSIS — D62 ACUTE POSTHEMORRHAGIC ANEMIA: ICD-10-CM

## 2021-12-09 PROBLEM — I25.10 ATHEROSCLEROTIC HEART DISEASE OF NATIVE CORONARY ARTERY WITHOUT ANGINA PECTORIS: Chronic | Status: ACTIVE | Noted: 2021-03-18

## 2021-12-09 PROBLEM — E78.5 HYPERLIPIDEMIA, UNSPECIFIED: Chronic | Status: ACTIVE | Noted: 2021-03-19

## 2021-12-09 PROBLEM — E11.9 TYPE 2 DIABETES MELLITUS WITHOUT COMPLICATIONS: Chronic | Status: ACTIVE | Noted: 2021-03-18

## 2021-12-09 PROBLEM — I50.9 HEART FAILURE, UNSPECIFIED: Chronic | Status: ACTIVE | Noted: 2021-03-20

## 2021-12-09 PROBLEM — I10 ESSENTIAL (PRIMARY) HYPERTENSION: Chronic | Status: ACTIVE | Noted: 2021-03-19

## 2021-12-09 PROBLEM — N18.9 CHRONIC KIDNEY DISEASE, UNSPECIFIED: Chronic | Status: ACTIVE | Noted: 2021-03-18

## 2022-01-01 ENCOUNTER — RESULT CHARGE (OUTPATIENT)
Age: 77
End: 2022-01-01

## 2022-01-01 ENCOUNTER — TRANSCRIPTION ENCOUNTER (OUTPATIENT)
Age: 77
End: 2022-01-01

## 2022-01-01 ENCOUNTER — APPOINTMENT (OUTPATIENT)
Dept: CARDIOLOGY | Facility: CLINIC | Age: 77
End: 2022-01-01
Payer: MEDICARE

## 2022-01-01 ENCOUNTER — APPOINTMENT (OUTPATIENT)
Dept: AFTER HOURS CARE | Facility: EMERGENCY ROOM | Age: 77
End: 2022-01-01
Payer: MEDICARE

## 2022-01-01 ENCOUNTER — FORM ENCOUNTER (OUTPATIENT)
Age: 77
End: 2022-01-01

## 2022-01-01 ENCOUNTER — INPATIENT (INPATIENT)
Facility: HOSPITAL | Age: 77
LOS: 0 days | End: 2022-05-17
Attending: INTERNAL MEDICINE | Admitting: INTERNAL MEDICINE
Payer: MEDICARE

## 2022-01-01 ENCOUNTER — APPOINTMENT (OUTPATIENT)
Dept: CARDIOLOGY | Facility: CLINIC | Age: 77
End: 2022-01-01

## 2022-01-01 ENCOUNTER — APPOINTMENT (OUTPATIENT)
Dept: CARE COORDINATION | Facility: HOME HEALTH | Age: 77
End: 2022-01-01

## 2022-01-01 ENCOUNTER — INPATIENT (INPATIENT)
Facility: HOSPITAL | Age: 77
LOS: 5 days | Discharge: HOME | End: 2022-03-25
Attending: INTERNAL MEDICINE | Admitting: INTERNAL MEDICINE
Payer: MEDICARE

## 2022-01-01 ENCOUNTER — OUTPATIENT (OUTPATIENT)
Dept: OUTPATIENT SERVICES | Facility: HOSPITAL | Age: 77
LOS: 1 days | Discharge: HOME | End: 2022-01-01
Payer: MEDICARE

## 2022-01-01 ENCOUNTER — APPOINTMENT (OUTPATIENT)
Dept: CARE COORDINATION | Facility: HOME HEALTH | Age: 77
End: 2022-01-01
Payer: MEDICARE

## 2022-01-01 VITALS
RESPIRATION RATE: 18 BRPM | HEIGHT: 67 IN | WEIGHT: 145.06 LBS | OXYGEN SATURATION: 97 % | HEART RATE: 58 BPM | DIASTOLIC BLOOD PRESSURE: 74 MMHG | SYSTOLIC BLOOD PRESSURE: 136 MMHG

## 2022-01-01 VITALS
HEIGHT: 67 IN | TEMPERATURE: 95.2 F | SYSTOLIC BLOOD PRESSURE: 114 MMHG | BODY MASS INDEX: 21.19 KG/M2 | HEART RATE: 71 BPM | DIASTOLIC BLOOD PRESSURE: 60 MMHG | WEIGHT: 135 LBS

## 2022-01-01 VITALS — SYSTOLIC BLOOD PRESSURE: 128 MMHG | DIASTOLIC BLOOD PRESSURE: 67 MMHG | HEART RATE: 75 BPM | RESPIRATION RATE: 18 BRPM

## 2022-01-01 VITALS
WEIGHT: 137 LBS | BODY MASS INDEX: 21.5 KG/M2 | HEIGHT: 67 IN | TEMPERATURE: 98.2 F | DIASTOLIC BLOOD PRESSURE: 70 MMHG | SYSTOLIC BLOOD PRESSURE: 110 MMHG

## 2022-01-01 VITALS — HEART RATE: 45 BPM | OXYGEN SATURATION: 85 %

## 2022-01-01 VITALS
HEIGHT: 67 IN | HEART RATE: 60 BPM | SYSTOLIC BLOOD PRESSURE: 98 MMHG | WEIGHT: 138 LBS | DIASTOLIC BLOOD PRESSURE: 62 MMHG | BODY MASS INDEX: 21.66 KG/M2

## 2022-01-01 DIAGNOSIS — I25.9 CHRONIC ISCHEMIC HEART DISEASE, UNSPECIFIED: ICD-10-CM

## 2022-01-01 DIAGNOSIS — I25.2 OLD MYOCARDIAL INFARCTION: ICD-10-CM

## 2022-01-01 DIAGNOSIS — R09.89 OTHER SPECIFIED SYMPTOMS AND SIGNS INVOLVING THE CIRCULATORY AND RESPIRATORY SYSTEMS: ICD-10-CM

## 2022-01-01 DIAGNOSIS — E83.39 OTHER DISORDERS OF PHOSPHORUS METABOLISM: ICD-10-CM

## 2022-01-01 DIAGNOSIS — I47.2 VENTRICULAR TACHYCARDIA: ICD-10-CM

## 2022-01-01 DIAGNOSIS — I50.23 ACUTE ON CHRONIC SYSTOLIC (CONGESTIVE) HEART FAILURE: ICD-10-CM

## 2022-01-01 DIAGNOSIS — I25.5 ISCHEMIC CARDIOMYOPATHY: ICD-10-CM

## 2022-01-01 DIAGNOSIS — Z87.891 PERSONAL HISTORY OF NICOTINE DEPENDENCE: ICD-10-CM

## 2022-01-01 DIAGNOSIS — Z01.810 ENCOUNTER FOR PREPROCEDURAL CARDIOVASCULAR EXAMINATION: ICD-10-CM

## 2022-01-01 DIAGNOSIS — I50.9 HEART FAILURE, UNSPECIFIED: ICD-10-CM

## 2022-01-01 DIAGNOSIS — E78.5 HYPERLIPIDEMIA, UNSPECIFIED: ICD-10-CM

## 2022-01-01 DIAGNOSIS — I70.213 ATHEROSCLEROSIS OF NATIVE ARTERIES OF EXTREMITIES WITH INTERMITTENT CLAUDICATION, BILATERAL LEGS: ICD-10-CM

## 2022-01-01 DIAGNOSIS — I24.8 OTHER FORMS OF ACUTE ISCHEMIC HEART DISEASE: ICD-10-CM

## 2022-01-01 DIAGNOSIS — N18.4 CHRONIC KIDNEY DISEASE, STAGE 4 (SEVERE): ICD-10-CM

## 2022-01-01 DIAGNOSIS — R06.00 DYSPNEA, UNSPECIFIED: ICD-10-CM

## 2022-01-01 DIAGNOSIS — N18.6 END STAGE RENAL DISEASE: ICD-10-CM

## 2022-01-01 DIAGNOSIS — M79.89 OTHER SPECIFIED SOFT TISSUE DISORDERS: ICD-10-CM

## 2022-01-01 DIAGNOSIS — Z00.00 ENCOUNTER FOR GENERAL ADULT MEDICAL EXAMINATION W/OUT ABNORMAL FINDINGS: ICD-10-CM

## 2022-01-01 DIAGNOSIS — E87.2 ACIDOSIS: ICD-10-CM

## 2022-01-01 DIAGNOSIS — I13.2 HYPERTENSIVE HEART AND CHRONIC KIDNEY DISEASE WITH HEART FAILURE AND WITH STAGE 5 CHRONIC KIDNEY DISEASE, OR END STAGE RENAL DISEASE: ICD-10-CM

## 2022-01-01 DIAGNOSIS — E11.9 TYPE 2 DIABETES MELLITUS WITHOUT COMPLICATIONS: ICD-10-CM

## 2022-01-01 DIAGNOSIS — E87.1 HYPO-OSMOLALITY AND HYPONATREMIA: ICD-10-CM

## 2022-01-01 DIAGNOSIS — Z79.4 LONG TERM (CURRENT) USE OF INSULIN: ICD-10-CM

## 2022-01-01 DIAGNOSIS — Z20.822 CONTACT WITH AND (SUSPECTED) EXPOSURE TO COVID-19: ICD-10-CM

## 2022-01-01 DIAGNOSIS — I10 ESSENTIAL (PRIMARY) HYPERTENSION: ICD-10-CM

## 2022-01-01 DIAGNOSIS — Z99.2 END STAGE RENAL DISEASE: ICD-10-CM

## 2022-01-01 LAB
A1C WITH ESTIMATED AVERAGE GLUCOSE RESULT: 7.5 % — HIGH (ref 4–5.6)
ALBUMIN SERPL ELPH-MCNC: 2 G/DL — LOW (ref 3.5–5.2)
ALBUMIN SERPL ELPH-MCNC: 3.5 G/DL — SIGNIFICANT CHANGE UP (ref 3.5–5.2)
ALBUMIN SERPL ELPH-MCNC: 3.6 G/DL — SIGNIFICANT CHANGE UP (ref 3.5–5.2)
ALBUMIN SERPL ELPH-MCNC: 3.7 G/DL — SIGNIFICANT CHANGE UP (ref 3.5–5.2)
ALBUMIN SERPL ELPH-MCNC: 3.8 G/DL — SIGNIFICANT CHANGE UP (ref 3.5–5.2)
ALBUMIN SERPL ELPH-MCNC: 3.9 G/DL — SIGNIFICANT CHANGE UP (ref 3.5–5.2)
ALBUMIN SERPL ELPH-MCNC: 4 G/DL — SIGNIFICANT CHANGE UP (ref 3.5–5.2)
ALBUMIN SERPL ELPH-MCNC: 4 G/DL — SIGNIFICANT CHANGE UP (ref 3.5–5.2)
ALBUMIN SERPL ELPH-MCNC: 4.1 G/DL — SIGNIFICANT CHANGE UP (ref 3.5–5.2)
ALP SERPL-CCNC: 125 U/L — HIGH (ref 30–115)
ALP SERPL-CCNC: 131 U/L — HIGH (ref 30–115)
ALP SERPL-CCNC: 139 U/L — HIGH (ref 30–115)
ALP SERPL-CCNC: 151 U/L — HIGH (ref 30–115)
ALP SERPL-CCNC: 155 U/L — HIGH (ref 30–115)
ALP SERPL-CCNC: 156 U/L — HIGH (ref 30–115)
ALP SERPL-CCNC: 171 U/L — HIGH (ref 30–115)
ALP SERPL-CCNC: 186 U/L — HIGH (ref 30–115)
ALP SERPL-CCNC: 51 U/L — SIGNIFICANT CHANGE UP (ref 30–115)
ALT FLD-CCNC: 110 U/L — HIGH (ref 0–41)
ALT FLD-CCNC: 124 U/L — HIGH (ref 0–41)
ALT FLD-CCNC: 41 U/L — SIGNIFICANT CHANGE UP (ref 0–41)
ALT FLD-CCNC: 67 U/L — HIGH (ref 0–41)
ALT FLD-CCNC: 77 U/L — HIGH (ref 0–41)
ALT FLD-CCNC: 86 U/L — HIGH (ref 0–41)
ALT FLD-CCNC: 92 U/L — HIGH (ref 0–41)
ALT FLD-CCNC: 93 U/L — HIGH (ref 0–41)
ALT FLD-CCNC: 96 U/L — HIGH (ref 0–41)
ANION GAP SERPL CALC-SCNC: 15 MMOL/L — HIGH (ref 7–14)
ANION GAP SERPL CALC-SCNC: 16 MMOL/L — HIGH (ref 7–14)
ANION GAP SERPL CALC-SCNC: 16 MMOL/L — HIGH (ref 7–14)
ANION GAP SERPL CALC-SCNC: 17 MMOL/L — HIGH (ref 7–14)
ANION GAP SERPL CALC-SCNC: 17 MMOL/L — HIGH (ref 7–14)
ANION GAP SERPL CALC-SCNC: 18 MMOL/L — HIGH (ref 7–14)
ANION GAP SERPL CALC-SCNC: 19 MMOL/L — HIGH (ref 7–14)
ANION GAP SERPL CALC-SCNC: 19 MMOL/L — SIGNIFICANT CHANGE UP (ref 7–14)
ANION GAP SERPL CALC-SCNC: 29 MMOL/L — HIGH (ref 7–14)
ANISOCYTOSIS BLD QL: SIGNIFICANT CHANGE UP
ANISOCYTOSIS BLD QL: SLIGHT — SIGNIFICANT CHANGE UP
APTT BLD: 34 SEC — SIGNIFICANT CHANGE UP (ref 27–39.2)
APTT BLD: >200 SEC — CRITICAL HIGH (ref 27–39.2)
APTT BLD: >200 SEC — CRITICAL HIGH (ref 27–39.2)
AST SERPL-CCNC: 37 U/L — SIGNIFICANT CHANGE UP (ref 0–41)
AST SERPL-CCNC: 42 U/L — HIGH (ref 0–41)
AST SERPL-CCNC: 46 U/L — HIGH (ref 0–41)
AST SERPL-CCNC: 51 U/L — HIGH (ref 0–41)
AST SERPL-CCNC: 62 U/L — HIGH (ref 0–41)
AST SERPL-CCNC: 64 U/L — HIGH (ref 0–41)
AST SERPL-CCNC: 68 U/L — HIGH (ref 0–41)
BASOPHILS # BLD AUTO: 0.03 K/UL — SIGNIFICANT CHANGE UP (ref 0–0.2)
BASOPHILS # BLD AUTO: 0.04 K/UL — SIGNIFICANT CHANGE UP (ref 0–0.2)
BASOPHILS # BLD AUTO: 0.08 K/UL — SIGNIFICANT CHANGE UP (ref 0–0.2)
BASOPHILS NFR BLD AUTO: 0.5 % — SIGNIFICANT CHANGE UP (ref 0–1)
BASOPHILS NFR BLD AUTO: 0.6 % — SIGNIFICANT CHANGE UP (ref 0–1)
BASOPHILS NFR BLD AUTO: 0.7 % — SIGNIFICANT CHANGE UP (ref 0–1)
BASOPHILS NFR BLD AUTO: 0.7 % — SIGNIFICANT CHANGE UP (ref 0–1)
BASOPHILS NFR BLD AUTO: 0.8 % — SIGNIFICANT CHANGE UP (ref 0–1)
BASOPHILS NFR BLD AUTO: 0.8 % — SIGNIFICANT CHANGE UP (ref 0–1)
BASOPHILS NFR BLD AUTO: 0.9 % — SIGNIFICANT CHANGE UP (ref 0–1)
BASOPHILS NFR BLD AUTO: 0.9 % — SIGNIFICANT CHANGE UP (ref 0–1)
BILIRUB SERPL-MCNC: 0.4 MG/DL — SIGNIFICANT CHANGE UP (ref 0.2–1.2)
BILIRUB SERPL-MCNC: 0.4 MG/DL — SIGNIFICANT CHANGE UP (ref 0.2–1.2)
BILIRUB SERPL-MCNC: 0.5 MG/DL — SIGNIFICANT CHANGE UP (ref 0.2–1.2)
BILIRUB SERPL-MCNC: 0.6 MG/DL — SIGNIFICANT CHANGE UP (ref 0.2–1.2)
BLD GP AB SCN SERPL QL: SIGNIFICANT CHANGE UP
BLD GP AB SCN SERPL QL: SIGNIFICANT CHANGE UP
BUN SERPL-MCNC: 20 MG/DL — SIGNIFICANT CHANGE UP (ref 10–20)
BUN SERPL-MCNC: 22 MG/DL — HIGH (ref 10–20)
BUN SERPL-MCNC: 64 MG/DL — CRITICAL HIGH (ref 10–20)
BUN SERPL-MCNC: 68 MG/DL — CRITICAL HIGH (ref 10–20)
BUN SERPL-MCNC: 70 MG/DL — CRITICAL HIGH (ref 10–20)
BUN SERPL-MCNC: 71 MG/DL — CRITICAL HIGH (ref 10–20)
BUN SERPL-MCNC: 73 MG/DL — CRITICAL HIGH (ref 10–20)
BUN SERPL-MCNC: 81 MG/DL — CRITICAL HIGH (ref 10–20)
BUN SERPL-MCNC: 81 MG/DL — CRITICAL HIGH (ref 10–20)
BUN SERPL-MCNC: 83 MG/DL — CRITICAL HIGH (ref 10–20)
BUN SERPL-MCNC: 85 MG/DL — CRITICAL HIGH (ref 10–20)
BUN SERPL-MCNC: 87 MG/DL — CRITICAL HIGH (ref 10–20)
BUN SERPL-MCNC: 89 MG/DL — CRITICAL HIGH (ref 10–20)
BURR CELLS BLD QL SMEAR: PRESENT — SIGNIFICANT CHANGE UP
BURR CELLS BLD QL SMEAR: SIGNIFICANT CHANGE UP
CA-I SERPL-SCNC: 0.85 MMOL/L — LOW (ref 1.15–1.33)
CALCIUM SERPL-MCNC: 10.4 MG/DL — HIGH (ref 8.5–10.1)
CALCIUM SERPL-MCNC: 7.9 MG/DL — LOW (ref 8.5–10.1)
CALCIUM SERPL-MCNC: 8 MG/DL — LOW (ref 8.5–10.1)
CALCIUM SERPL-MCNC: 8.2 MG/DL — LOW (ref 8.5–10.1)
CALCIUM SERPL-MCNC: 8.3 MG/DL — LOW (ref 8.5–10.1)
CALCIUM SERPL-MCNC: 8.4 MG/DL — LOW (ref 8.5–10.1)
CALCIUM SERPL-MCNC: 8.4 MG/DL — LOW (ref 8.5–10.1)
CALCIUM SERPL-MCNC: 8.5 MG/DL — SIGNIFICANT CHANGE UP (ref 8.5–10.1)
CALCIUM SERPL-MCNC: 8.5 MG/DL — SIGNIFICANT CHANGE UP (ref 8.5–10.1)
CALCIUM SERPL-MCNC: 8.6 MG/DL — SIGNIFICANT CHANGE UP (ref 8.5–10.1)
CALCIUM SERPL-MCNC: 8.7 MG/DL — SIGNIFICANT CHANGE UP (ref 8.5–10.1)
CALCIUM SERPL-MCNC: 8.7 MG/DL — SIGNIFICANT CHANGE UP (ref 8.5–10.1)
CALCIUM SERPL-MCNC: 8.8 MG/DL — SIGNIFICANT CHANGE UP (ref 8.5–10.1)
CHLORIDE SERPL-SCNC: 105 MMOL/L — SIGNIFICANT CHANGE UP (ref 98–110)
CHLORIDE SERPL-SCNC: 110 MMOL/L — SIGNIFICANT CHANGE UP (ref 98–110)
CHLORIDE SERPL-SCNC: 91 MMOL/L — LOW (ref 98–110)
CHLORIDE SERPL-SCNC: 92 MMOL/L — LOW (ref 98–110)
CHLORIDE SERPL-SCNC: 93 MMOL/L — SIGNIFICANT CHANGE UP (ref 98–110)
CHLORIDE SERPL-SCNC: 94 MMOL/L — LOW (ref 98–110)
CHLORIDE SERPL-SCNC: 95 MMOL/L — LOW (ref 98–110)
CHLORIDE SERPL-SCNC: 96 MMOL/L — LOW (ref 98–110)
CHLORIDE SERPL-SCNC: 97 MMOL/L — LOW (ref 98–110)
CHLORIDE SERPL-SCNC: 98 MMOL/L — SIGNIFICANT CHANGE UP (ref 98–110)
CHLORIDE SERPL-SCNC: 98 MMOL/L — SIGNIFICANT CHANGE UP (ref 98–110)
CHOLEST SERPL-MCNC: 91 MG/DL — SIGNIFICANT CHANGE UP
CK MB CFR SERPL CALC: 153.1 NG/ML — HIGH (ref 0.6–6.3)
CK MB CFR SERPL CALC: 96.5 NG/ML — HIGH (ref 0.6–6.3)
CO2 SERPL-SCNC: 15 MMOL/L — LOW (ref 17–32)
CO2 SERPL-SCNC: 16 MMOL/L — LOW (ref 17–32)
CO2 SERPL-SCNC: 20 MMOL/L — SIGNIFICANT CHANGE UP (ref 17–32)
CO2 SERPL-SCNC: 20 MMOL/L — SIGNIFICANT CHANGE UP (ref 17–32)
CO2 SERPL-SCNC: 22 MMOL/L — SIGNIFICANT CHANGE UP (ref 17–32)
CO2 SERPL-SCNC: 23 MMOL/L — SIGNIFICANT CHANGE UP (ref 17–32)
CO2 SERPL-SCNC: 25 MMOL/L — SIGNIFICANT CHANGE UP (ref 17–32)
CO2 SERPL-SCNC: 45 MMOL/L — CRITICAL HIGH (ref 17–32)
CO2 SERPL-SCNC: 8 MMOL/L — CRITICAL LOW (ref 17–32)
CREAT SERPL-MCNC: 2.5 MG/DL — HIGH (ref 0.7–1.5)
CREAT SERPL-MCNC: 2.7 MG/DL — HIGH (ref 0.7–1.5)
CREAT SERPL-MCNC: 4.7 MG/DL — CRITICAL HIGH (ref 0.7–1.5)
CREAT SERPL-MCNC: 5 MG/DL — CRITICAL HIGH (ref 0.7–1.5)
CREAT SERPL-MCNC: 5.3 MG/DL — CRITICAL HIGH (ref 0.7–1.5)
CREAT SERPL-MCNC: 5.3 MG/DL — CRITICAL HIGH (ref 0.7–1.5)
CREAT SERPL-MCNC: 5.4 MG/DL — CRITICAL HIGH (ref 0.7–1.5)
CREAT SERPL-MCNC: 5.4 MG/DL — CRITICAL HIGH (ref 0.7–1.5)
CREAT SERPL-MCNC: 5.6 MG/DL — CRITICAL HIGH (ref 0.7–1.5)
CREAT SERPL-MCNC: 5.6 MG/DL — CRITICAL HIGH (ref 0.7–1.5)
CREAT SERPL-MCNC: 5.7 MG/DL — CRITICAL HIGH (ref 0.7–1.5)
CREAT SERPL-MCNC: 5.8 MG/DL — CRITICAL HIGH (ref 0.7–1.5)
CREAT SERPL-MCNC: 5.9 MG/DL — CRITICAL HIGH (ref 0.7–1.5)
D DIMER BLD IA.RAPID-MCNC: 827 NG/ML DDU — HIGH (ref 0–230)
D DIMER BLD IA.RAPID-MCNC: HIGH NG/ML DDU (ref 0–230)
EGFR: 10 ML/MIN/1.73M2 — LOW
EGFR: 11 ML/MIN/1.73M2 — LOW
EGFR: 12 ML/MIN/1.73M2 — LOW
EGFR: 24 ML/MIN/1.73M2 — LOW
EGFR: 26 ML/MIN/1.73M2 — LOW
EGFR: 9 ML/MIN/1.73M2 — LOW
EOSINOPHIL # BLD AUTO: 0.13 K/UL — SIGNIFICANT CHANGE UP (ref 0–0.7)
EOSINOPHIL # BLD AUTO: 0.16 K/UL — SIGNIFICANT CHANGE UP (ref 0–0.7)
EOSINOPHIL # BLD AUTO: 0.17 K/UL — SIGNIFICANT CHANGE UP (ref 0–0.7)
EOSINOPHIL # BLD AUTO: 0.21 K/UL — SIGNIFICANT CHANGE UP (ref 0–0.7)
EOSINOPHIL # BLD AUTO: 0.23 K/UL — SIGNIFICANT CHANGE UP (ref 0–0.7)
EOSINOPHIL # BLD AUTO: 0.28 K/UL — SIGNIFICANT CHANGE UP (ref 0–0.7)
EOSINOPHIL # BLD AUTO: 0.33 K/UL — SIGNIFICANT CHANGE UP (ref 0–0.7)
EOSINOPHIL # BLD AUTO: 0.35 K/UL — SIGNIFICANT CHANGE UP (ref 0–0.7)
EOSINOPHIL NFR BLD AUTO: 2.5 % — SIGNIFICANT CHANGE UP (ref 0–8)
EOSINOPHIL NFR BLD AUTO: 3.6 % — SIGNIFICANT CHANGE UP (ref 0–8)
EOSINOPHIL NFR BLD AUTO: 3.7 % — SIGNIFICANT CHANGE UP (ref 0–8)
EOSINOPHIL NFR BLD AUTO: 3.9 % — SIGNIFICANT CHANGE UP (ref 0–8)
EOSINOPHIL NFR BLD AUTO: 4.2 % — SIGNIFICANT CHANGE UP (ref 0–8)
EOSINOPHIL NFR BLD AUTO: 4.5 % — SIGNIFICANT CHANGE UP (ref 0–8)
EOSINOPHIL NFR BLD AUTO: 6.1 % — SIGNIFICANT CHANGE UP (ref 0–8)
EOSINOPHIL NFR BLD AUTO: 6.2 % — SIGNIFICANT CHANGE UP (ref 0–8)
ESTIMATED AVERAGE GLUCOSE: 169 MG/DL — HIGH (ref 68–114)
FIBRINOGEN PPP-MCNC: 163 MG/DL — LOW (ref 204.4–570.6)
GAS PNL BLDA: SIGNIFICANT CHANGE UP
GAS PNL BLDV: SIGNIFICANT CHANGE UP
GAS PNL BLDV: SIGNIFICANT CHANGE UP
GIANT PLATELETS BLD QL SMEAR: PRESENT — SIGNIFICANT CHANGE UP
GLUCOSE BLDC GLUCOMTR-MCNC: 101 MG/DL — HIGH (ref 70–99)
GLUCOSE BLDC GLUCOMTR-MCNC: 102 MG/DL — HIGH (ref 70–99)
GLUCOSE BLDC GLUCOMTR-MCNC: 108 MG/DL — HIGH (ref 70–99)
GLUCOSE BLDC GLUCOMTR-MCNC: 112 MG/DL — HIGH (ref 70–99)
GLUCOSE BLDC GLUCOMTR-MCNC: 115 MG/DL — HIGH (ref 70–99)
GLUCOSE BLDC GLUCOMTR-MCNC: 121 MG/DL — HIGH (ref 70–99)
GLUCOSE BLDC GLUCOMTR-MCNC: 126 MG/DL — HIGH (ref 70–99)
GLUCOSE BLDC GLUCOMTR-MCNC: 133 MG/DL — HIGH (ref 70–99)
GLUCOSE BLDC GLUCOMTR-MCNC: 158 MG/DL — HIGH (ref 70–99)
GLUCOSE BLDC GLUCOMTR-MCNC: 176 MG/DL — HIGH (ref 70–99)
GLUCOSE BLDC GLUCOMTR-MCNC: 179 MG/DL — HIGH (ref 70–99)
GLUCOSE BLDC GLUCOMTR-MCNC: 193 MG/DL — HIGH (ref 70–99)
GLUCOSE BLDC GLUCOMTR-MCNC: 207 MG/DL — HIGH (ref 70–99)
GLUCOSE BLDC GLUCOMTR-MCNC: 215 MG/DL — HIGH (ref 70–99)
GLUCOSE BLDC GLUCOMTR-MCNC: 215 MG/DL — HIGH (ref 70–99)
GLUCOSE BLDC GLUCOMTR-MCNC: 233 MG/DL — HIGH (ref 70–99)
GLUCOSE BLDC GLUCOMTR-MCNC: 234 MG/DL — HIGH (ref 70–99)
GLUCOSE BLDC GLUCOMTR-MCNC: 235 MG/DL — HIGH (ref 70–99)
GLUCOSE BLDC GLUCOMTR-MCNC: 257 MG/DL — HIGH (ref 70–99)
GLUCOSE BLDC GLUCOMTR-MCNC: 28 MG/DL — CRITICAL LOW (ref 70–99)
GLUCOSE BLDC GLUCOMTR-MCNC: 287 MG/DL — HIGH (ref 70–99)
GLUCOSE BLDC GLUCOMTR-MCNC: 373 MG/DL — HIGH (ref 70–99)
GLUCOSE BLDC GLUCOMTR-MCNC: 53 MG/DL — CRITICAL LOW (ref 70–99)
GLUCOSE BLDC GLUCOMTR-MCNC: 78 MG/DL — SIGNIFICANT CHANGE UP (ref 70–99)
GLUCOSE BLDC GLUCOMTR-MCNC: 80 MG/DL — SIGNIFICANT CHANGE UP (ref 70–99)
GLUCOSE BLDC GLUCOMTR-MCNC: 80 MG/DL — SIGNIFICANT CHANGE UP (ref 70–99)
GLUCOSE BLDC GLUCOMTR-MCNC: 81 MG/DL — SIGNIFICANT CHANGE UP (ref 70–99)
GLUCOSE BLDC GLUCOMTR-MCNC: 82 MG/DL — SIGNIFICANT CHANGE UP (ref 70–99)
GLUCOSE SERPL-MCNC: 105 MG/DL — HIGH (ref 70–99)
GLUCOSE SERPL-MCNC: 111 MG/DL — HIGH (ref 70–99)
GLUCOSE SERPL-MCNC: 112 MG/DL — HIGH (ref 70–99)
GLUCOSE SERPL-MCNC: 134 MG/DL — HIGH (ref 70–99)
GLUCOSE SERPL-MCNC: 190 MG/DL — HIGH (ref 70–99)
GLUCOSE SERPL-MCNC: 238 MG/DL — HIGH (ref 70–99)
GLUCOSE SERPL-MCNC: 247 MG/DL — HIGH (ref 70–99)
GLUCOSE SERPL-MCNC: 267 MG/DL — HIGH (ref 70–99)
GLUCOSE SERPL-MCNC: 281 MG/DL — HIGH (ref 70–99)
GLUCOSE SERPL-MCNC: 33 MG/DL — CRITICAL LOW (ref 70–99)
GLUCOSE SERPL-MCNC: 332 MG/DL — HIGH (ref 70–99)
GLUCOSE SERPL-MCNC: 75 MG/DL — SIGNIFICANT CHANGE UP (ref 70–99)
GLUCOSE SERPL-MCNC: 82 MG/DL — SIGNIFICANT CHANGE UP (ref 70–99)
HAV IGM SER-ACNC: SIGNIFICANT CHANGE UP
HBV CORE IGM SER-ACNC: SIGNIFICANT CHANGE UP
HBV SURFACE AB SER-ACNC: REACTIVE
HBV SURFACE AG SER-ACNC: SIGNIFICANT CHANGE UP
HCT VFR BLD CALC: 19.6 % — LOW (ref 42–52)
HCT VFR BLD CALC: 32.4 % — LOW (ref 42–52)
HCT VFR BLD CALC: 33 % — LOW (ref 42–52)
HCT VFR BLD CALC: 33.6 % — LOW (ref 42–52)
HCT VFR BLD CALC: 34.3 % — LOW (ref 42–52)
HCT VFR BLD CALC: 35.3 % — LOW (ref 42–52)
HCT VFR BLD CALC: 35.8 % — LOW (ref 42–52)
HCT VFR BLD CALC: 35.8 % — LOW (ref 42–52)
HCT VFR BLD CALC: 35.9 % — LOW (ref 42–52)
HCT VFR BLD CALC: 36.3 % — LOW (ref 42–52)
HCT VFR BLDA CALC: 25 % — LOW (ref 39–51)
HCV AB S/CO SERPL IA: 0.45 S/CO — SIGNIFICANT CHANGE UP (ref 0–0.99)
HCV AB SERPL-IMP: SIGNIFICANT CHANGE UP
HDLC SERPL-MCNC: 39 MG/DL — LOW
HGB BLD CALC-MCNC: 8.3 G/DL — LOW (ref 12.6–17.4)
HGB BLD-MCNC: 10.4 G/DL — LOW (ref 14–18)
HGB BLD-MCNC: 11.1 G/DL — LOW (ref 14–18)
HGB BLD-MCNC: 11.6 G/DL — LOW (ref 14–18)
HGB BLD-MCNC: 11.6 G/DL — LOW (ref 14–18)
HGB BLD-MCNC: 11.7 G/DL — LOW (ref 14–18)
HGB BLD-MCNC: 11.8 G/DL — LOW (ref 14–18)
HGB BLD-MCNC: 12.1 G/DL — LOW (ref 14–18)
HGB BLD-MCNC: 12.1 G/DL — LOW (ref 14–18)
HGB BLD-MCNC: 5.9 G/DL — CRITICAL LOW (ref 14–18)
HGB BLD-MCNC: 9.7 G/DL — LOW (ref 14–18)
HYPOCHROMIA BLD QL: SIGNIFICANT CHANGE UP
IMM GRANULOCYTES NFR BLD AUTO: 0.2 % — SIGNIFICANT CHANGE UP (ref 0.1–0.3)
IMM GRANULOCYTES NFR BLD AUTO: 0.3 % — SIGNIFICANT CHANGE UP (ref 0.1–0.3)
IMM GRANULOCYTES NFR BLD AUTO: 0.4 % — HIGH (ref 0.1–0.3)
IMM GRANULOCYTES NFR BLD AUTO: 0.5 % — HIGH (ref 0.1–0.3)
INR BLD: 1.22 RATIO — SIGNIFICANT CHANGE UP (ref 0.65–1.3)
INR BLD: 1.33 RATIO — HIGH (ref 0.65–1.3)
INR BLD: 1.85 RATIO — HIGH (ref 0.65–1.3)
INR BLD: 2.22 RATIO — HIGH (ref 0.65–1.3)
LACTATE BLDV-MCNC: 9.5 MMOL/L — CRITICAL HIGH (ref 0.5–2)
LACTATE SERPL-SCNC: 15.5 MMOL/L — CRITICAL HIGH (ref 0.7–2)
LIPID PNL WITH DIRECT LDL SERPL: 41 MG/DL — SIGNIFICANT CHANGE UP
LYMPHOCYTES # BLD AUTO: 0.69 K/UL — LOW (ref 1.2–3.4)
LYMPHOCYTES # BLD AUTO: 0.96 K/UL — LOW (ref 1.2–3.4)
LYMPHOCYTES # BLD AUTO: 0.96 K/UL — LOW (ref 1.2–3.4)
LYMPHOCYTES # BLD AUTO: 0.99 K/UL — LOW (ref 1.2–3.4)
LYMPHOCYTES # BLD AUTO: 1.11 K/UL — LOW (ref 1.2–3.4)
LYMPHOCYTES # BLD AUTO: 1.22 K/UL — SIGNIFICANT CHANGE UP (ref 1.2–3.4)
LYMPHOCYTES # BLD AUTO: 1.24 K/UL — SIGNIFICANT CHANGE UP (ref 1.2–3.4)
LYMPHOCYTES # BLD AUTO: 16.7 % — LOW (ref 20.5–51.1)
LYMPHOCYTES # BLD AUTO: 16.9 % — LOW (ref 20.5–51.1)
LYMPHOCYTES # BLD AUTO: 19.7 % — LOW (ref 20.5–51.1)
LYMPHOCYTES # BLD AUTO: 21.1 % — SIGNIFICANT CHANGE UP (ref 20.5–51.1)
LYMPHOCYTES # BLD AUTO: 23.4 % — SIGNIFICANT CHANGE UP (ref 20.5–51.1)
LYMPHOCYTES # BLD AUTO: 23.9 % — SIGNIFICANT CHANGE UP (ref 20.5–51.1)
LYMPHOCYTES # BLD AUTO: 24.1 % — SIGNIFICANT CHANGE UP (ref 20.5–51.1)
LYMPHOCYTES # BLD AUTO: 4.28 K/UL — HIGH (ref 1.2–3.4)
LYMPHOCYTES # BLD AUTO: 46.4 % — SIGNIFICANT CHANGE UP (ref 20.5–51.1)
MACROCYTES BLD QL: SLIGHT — SIGNIFICANT CHANGE UP
MAGNESIUM SERPL-MCNC: 1.8 MG/DL — SIGNIFICANT CHANGE UP (ref 1.8–2.4)
MAGNESIUM SERPL-MCNC: 1.8 MG/DL — SIGNIFICANT CHANGE UP (ref 1.8–2.4)
MAGNESIUM SERPL-MCNC: 1.9 MG/DL — SIGNIFICANT CHANGE UP (ref 1.8–2.4)
MAGNESIUM SERPL-MCNC: 2 MG/DL — SIGNIFICANT CHANGE UP (ref 1.8–2.4)
MAGNESIUM SERPL-MCNC: 2.4 MG/DL — SIGNIFICANT CHANGE UP (ref 1.8–2.4)
MAGNESIUM SERPL-MCNC: 2.6 MG/DL — HIGH (ref 1.8–2.4)
MAGNESIUM SERPL-MCNC: >9.7 MG/DL — CRITICAL HIGH (ref 1.8–2.4)
MANUAL SMEAR VERIFICATION: SIGNIFICANT CHANGE UP
MANUAL SMEAR VERIFICATION: SIGNIFICANT CHANGE UP
MCHC RBC-ENTMCNC: 29.1 G/DL — LOW (ref 32–37)
MCHC RBC-ENTMCNC: 29.6 PG — SIGNIFICANT CHANGE UP (ref 27–31)
MCHC RBC-ENTMCNC: 29.7 PG — SIGNIFICANT CHANGE UP (ref 27–31)
MCHC RBC-ENTMCNC: 29.7 PG — SIGNIFICANT CHANGE UP (ref 27–31)
MCHC RBC-ENTMCNC: 29.9 G/DL — LOW (ref 32–37)
MCHC RBC-ENTMCNC: 30.1 G/DL — LOW (ref 32–37)
MCHC RBC-ENTMCNC: 30.3 PG — SIGNIFICANT CHANGE UP (ref 27–31)
MCHC RBC-ENTMCNC: 30.3 PG — SIGNIFICANT CHANGE UP (ref 27–31)
MCHC RBC-ENTMCNC: 30.4 PG — SIGNIFICANT CHANGE UP (ref 27–31)
MCHC RBC-ENTMCNC: 30.8 PG — SIGNIFICANT CHANGE UP (ref 27–31)
MCHC RBC-ENTMCNC: 30.9 PG — SIGNIFICANT CHANGE UP (ref 27–31)
MCHC RBC-ENTMCNC: 30.9 PG — SIGNIFICANT CHANGE UP (ref 27–31)
MCHC RBC-ENTMCNC: 31.6 PG — HIGH (ref 27–31)
MCHC RBC-ENTMCNC: 32.3 G/DL — SIGNIFICANT CHANGE UP (ref 32–37)
MCHC RBC-ENTMCNC: 32.4 G/DL — SIGNIFICANT CHANGE UP (ref 32–37)
MCHC RBC-ENTMCNC: 33 G/DL — SIGNIFICANT CHANGE UP (ref 32–37)
MCHC RBC-ENTMCNC: 33.3 G/DL — SIGNIFICANT CHANGE UP (ref 32–37)
MCHC RBC-ENTMCNC: 34.3 G/DL — SIGNIFICANT CHANGE UP (ref 32–37)
MCHC RBC-ENTMCNC: 34.5 G/DL — SIGNIFICANT CHANGE UP (ref 32–37)
MCHC RBC-ENTMCNC: 35.5 G/DL — SIGNIFICANT CHANGE UP (ref 32–37)
MCV RBC AUTO: 103.2 FL — HIGH (ref 80–94)
MCV RBC AUTO: 104.8 FL — HIGH (ref 80–94)
MCV RBC AUTO: 105.9 FL — HIGH (ref 80–94)
MCV RBC AUTO: 87.1 FL — SIGNIFICANT CHANGE UP (ref 80–94)
MCV RBC AUTO: 87.7 FL — SIGNIFICANT CHANGE UP (ref 80–94)
MCV RBC AUTO: 88.7 FL — SIGNIFICANT CHANGE UP (ref 80–94)
MCV RBC AUTO: 90.2 FL — SIGNIFICANT CHANGE UP (ref 80–94)
MCV RBC AUTO: 91 FL — SIGNIFICANT CHANGE UP (ref 80–94)
MCV RBC AUTO: 91.5 FL — SIGNIFICANT CHANGE UP (ref 80–94)
MCV RBC AUTO: 92.1 FL — SIGNIFICANT CHANGE UP (ref 80–94)
MICROCYTES BLD QL: SLIGHT — SIGNIFICANT CHANGE UP
MICROCYTES BLD QL: SLIGHT — SIGNIFICANT CHANGE UP
MONOCYTES # BLD AUTO: 0.08 K/UL — LOW (ref 0.1–0.6)
MONOCYTES # BLD AUTO: 0.42 K/UL — SIGNIFICANT CHANGE UP (ref 0.1–0.6)
MONOCYTES # BLD AUTO: 0.55 K/UL — SIGNIFICANT CHANGE UP (ref 0.1–0.6)
MONOCYTES # BLD AUTO: 0.56 K/UL — SIGNIFICANT CHANGE UP (ref 0.1–0.6)
MONOCYTES # BLD AUTO: 0.56 K/UL — SIGNIFICANT CHANGE UP (ref 0.1–0.6)
MONOCYTES # BLD AUTO: 0.62 K/UL — HIGH (ref 0.1–0.6)
MONOCYTES # BLD AUTO: 0.66 K/UL — HIGH (ref 0.1–0.6)
MONOCYTES # BLD AUTO: 0.68 K/UL — HIGH (ref 0.1–0.6)
MONOCYTES NFR BLD AUTO: 0.9 % — LOW (ref 1.7–9.3)
MONOCYTES NFR BLD AUTO: 10.3 % — HIGH (ref 1.7–9.3)
MONOCYTES NFR BLD AUTO: 10.6 % — HIGH (ref 1.7–9.3)
MONOCYTES NFR BLD AUTO: 11 % — HIGH (ref 1.7–9.3)
MONOCYTES NFR BLD AUTO: 11.8 % — HIGH (ref 1.7–9.3)
MONOCYTES NFR BLD AUTO: 11.9 % — HIGH (ref 1.7–9.3)
MONOCYTES NFR BLD AUTO: 13.1 % — HIGH (ref 1.7–9.3)
MONOCYTES NFR BLD AUTO: 13.6 % — HIGH (ref 1.7–9.3)
MYELOCYTES NFR BLD: 0.9 % — HIGH (ref 0–0)
NEUTROPHILS # BLD AUTO: 2.65 K/UL — SIGNIFICANT CHANGE UP (ref 1.4–6.5)
NEUTROPHILS # BLD AUTO: 2.75 K/UL — SIGNIFICANT CHANGE UP (ref 1.4–6.5)
NEUTROPHILS # BLD AUTO: 2.76 K/UL — SIGNIFICANT CHANGE UP (ref 1.4–6.5)
NEUTROPHILS # BLD AUTO: 3.01 K/UL — SIGNIFICANT CHANGE UP (ref 1.4–6.5)
NEUTROPHILS # BLD AUTO: 3.12 K/UL — SIGNIFICANT CHANGE UP (ref 1.4–6.5)
NEUTROPHILS # BLD AUTO: 3.32 K/UL — SIGNIFICANT CHANGE UP (ref 1.4–6.5)
NEUTROPHILS # BLD AUTO: 3.7 K/UL — SIGNIFICANT CHANGE UP (ref 1.4–6.5)
NEUTROPHILS # BLD AUTO: 4.36 K/UL — SIGNIFICANT CHANGE UP (ref 1.4–6.5)
NEUTROPHILS NFR BLD AUTO: 45.5 % — SIGNIFICANT CHANGE UP (ref 42.2–75.2)
NEUTROPHILS NFR BLD AUTO: 58.2 % — SIGNIFICANT CHANGE UP (ref 42.2–75.2)
NEUTROPHILS NFR BLD AUTO: 59.2 % — SIGNIFICANT CHANGE UP (ref 42.2–75.2)
NEUTROPHILS NFR BLD AUTO: 59.4 % — SIGNIFICANT CHANGE UP (ref 42.2–75.2)
NEUTROPHILS NFR BLD AUTO: 62.2 % — SIGNIFICANT CHANGE UP (ref 42.2–75.2)
NEUTROPHILS NFR BLD AUTO: 62.5 % — SIGNIFICANT CHANGE UP (ref 42.2–75.2)
NEUTROPHILS NFR BLD AUTO: 64.6 % — SIGNIFICANT CHANGE UP (ref 42.2–75.2)
NEUTROPHILS NFR BLD AUTO: 67.7 % — SIGNIFICANT CHANGE UP (ref 42.2–75.2)
NEUTS BAND # BLD: 1.8 % — SIGNIFICANT CHANGE UP (ref 0–6)
NON HDL CHOLESTEROL: 52 MG/DL — SIGNIFICANT CHANGE UP
NRBC # BLD: 0 /100 WBCS — SIGNIFICANT CHANGE UP (ref 0–0)
NRBC # BLD: 1 /100 — HIGH (ref 0–0)
NRBC # BLD: SIGNIFICANT CHANGE UP /100 WBCS (ref 0–0)
NT-PROBNP SERPL-SCNC: HIGH PG/ML (ref 0–300)
PH BLDV: 7.64 — HIGH (ref 7.32–7.43)
PHOSPHATE SERPL-MCNC: 5.6 MG/DL — HIGH (ref 2.1–4.9)
PHOSPHATE SERPL-MCNC: 6 MG/DL — HIGH (ref 2.1–4.9)
PLAT MORPH BLD: NORMAL — SIGNIFICANT CHANGE UP
PLAT MORPH BLD: NORMAL — SIGNIFICANT CHANGE UP
PLATELET # BLD AUTO: 102 K/UL — LOW (ref 130–400)
PLATELET # BLD AUTO: 115 K/UL — LOW (ref 130–400)
PLATELET # BLD AUTO: 162 K/UL — SIGNIFICANT CHANGE UP (ref 130–400)
PLATELET # BLD AUTO: 179 K/UL — SIGNIFICANT CHANGE UP (ref 130–400)
PLATELET # BLD AUTO: 205 K/UL — SIGNIFICANT CHANGE UP (ref 130–400)
PLATELET # BLD AUTO: 215 K/UL — SIGNIFICANT CHANGE UP (ref 130–400)
PLATELET # BLD AUTO: 219 K/UL — SIGNIFICANT CHANGE UP (ref 130–400)
PLATELET # BLD AUTO: 222 K/UL — SIGNIFICANT CHANGE UP (ref 130–400)
PLATELET # BLD AUTO: 241 K/UL — SIGNIFICANT CHANGE UP (ref 130–400)
PLATELET # BLD AUTO: 76 K/UL — LOW (ref 130–400)
PO2 BLDV: 23 MMHG — SIGNIFICANT CHANGE UP
POIKILOCYTOSIS BLD QL AUTO: SIGNIFICANT CHANGE UP
POIKILOCYTOSIS BLD QL AUTO: SIGNIFICANT CHANGE UP
POLYCHROMASIA BLD QL SMEAR: SLIGHT — SIGNIFICANT CHANGE UP
POTASSIUM BLDV-SCNC: 2 MMOL/L — CRITICAL LOW (ref 3.5–5.1)
POTASSIUM SERPL-MCNC: 2.8 MMOL/L — LOW (ref 3.5–5)
POTASSIUM SERPL-MCNC: 3.8 MMOL/L — SIGNIFICANT CHANGE UP (ref 3.5–5)
POTASSIUM SERPL-MCNC: 3.9 MMOL/L — SIGNIFICANT CHANGE UP (ref 3.5–5)
POTASSIUM SERPL-MCNC: 4 MMOL/L — SIGNIFICANT CHANGE UP (ref 3.5–5)
POTASSIUM SERPL-MCNC: 4 MMOL/L — SIGNIFICANT CHANGE UP (ref 3.5–5)
POTASSIUM SERPL-MCNC: 4.1 MMOL/L — SIGNIFICANT CHANGE UP (ref 3.5–5)
POTASSIUM SERPL-MCNC: 4.1 MMOL/L — SIGNIFICANT CHANGE UP (ref 3.5–5)
POTASSIUM SERPL-MCNC: 4.2 MMOL/L — SIGNIFICANT CHANGE UP (ref 3.5–5)
POTASSIUM SERPL-MCNC: 4.4 MMOL/L — SIGNIFICANT CHANGE UP (ref 3.5–5)
POTASSIUM SERPL-MCNC: 4.6 MMOL/L — SIGNIFICANT CHANGE UP (ref 3.5–5)
POTASSIUM SERPL-MCNC: 4.9 MMOL/L — SIGNIFICANT CHANGE UP (ref 3.5–5)
POTASSIUM SERPL-SCNC: 2.8 MMOL/L — LOW (ref 3.5–5)
POTASSIUM SERPL-SCNC: 3.8 MMOL/L — SIGNIFICANT CHANGE UP (ref 3.5–5)
POTASSIUM SERPL-SCNC: 3.9 MMOL/L — SIGNIFICANT CHANGE UP (ref 3.5–5)
POTASSIUM SERPL-SCNC: 4 MMOL/L — SIGNIFICANT CHANGE UP (ref 3.5–5)
POTASSIUM SERPL-SCNC: 4 MMOL/L — SIGNIFICANT CHANGE UP (ref 3.5–5)
POTASSIUM SERPL-SCNC: 4.1 MMOL/L — SIGNIFICANT CHANGE UP (ref 3.5–5)
POTASSIUM SERPL-SCNC: 4.1 MMOL/L — SIGNIFICANT CHANGE UP (ref 3.5–5)
POTASSIUM SERPL-SCNC: 4.2 MMOL/L — SIGNIFICANT CHANGE UP (ref 3.5–5)
POTASSIUM SERPL-SCNC: 4.4 MMOL/L — SIGNIFICANT CHANGE UP (ref 3.5–5)
POTASSIUM SERPL-SCNC: 4.6 MMOL/L — SIGNIFICANT CHANGE UP (ref 3.5–5)
POTASSIUM SERPL-SCNC: 4.9 MMOL/L — SIGNIFICANT CHANGE UP (ref 3.5–5)
PROCALCITONIN SERPL-MCNC: 0.33 NG/ML — HIGH (ref 0.02–0.1)
PROMYELOCYTES # FLD: 0.9 % — HIGH (ref 0–0)
PROT SERPL-MCNC: 4.6 G/DL — LOW (ref 6–8)
PROT SERPL-MCNC: 6 G/DL — SIGNIFICANT CHANGE UP (ref 6–8)
PROT SERPL-MCNC: 6.3 G/DL — SIGNIFICANT CHANGE UP (ref 6–8)
PROT SERPL-MCNC: 6.4 G/DL — SIGNIFICANT CHANGE UP (ref 6–8)
PROT SERPL-MCNC: 6.6 G/DL — SIGNIFICANT CHANGE UP (ref 6–8)
PROT SERPL-MCNC: 6.9 G/DL — SIGNIFICANT CHANGE UP (ref 6–8)
PROT SERPL-MCNC: 7 G/DL — SIGNIFICANT CHANGE UP (ref 6–8)
PROTHROM AB SERPL-ACNC: 14 SEC — HIGH (ref 9.95–12.87)
PROTHROM AB SERPL-ACNC: 15.3 SEC — HIGH (ref 9.95–12.87)
PROTHROM AB SERPL-ACNC: 21.1 SEC — HIGH (ref 9.95–12.87)
PROTHROM AB SERPL-ACNC: 25.3 SEC — HIGH (ref 9.95–12.87)
RBC # BLD: 1.87 M/UL — LOW (ref 4.7–6.1)
RBC # BLD: 3.14 M/UL — LOW (ref 4.7–6.1)
RBC # BLD: 3.38 M/UL — LOW (ref 4.7–6.1)
RBC # BLD: 3.75 M/UL — LOW (ref 4.7–6.1)
RBC # BLD: 3.79 M/UL — LOW (ref 4.7–6.1)
RBC # BLD: 3.83 M/UL — LOW (ref 4.7–6.1)
RBC # BLD: 3.9 M/UL — LOW (ref 4.7–6.1)
RBC # BLD: 3.97 M/UL — LOW (ref 4.7–6.1)
RBC # BLD: 3.98 M/UL — LOW (ref 4.7–6.1)
RBC # BLD: 3.99 M/UL — LOW (ref 4.7–6.1)
RBC # FLD: 13.5 % — SIGNIFICANT CHANGE UP (ref 11.5–14.5)
RBC # FLD: 13.6 % — SIGNIFICANT CHANGE UP (ref 11.5–14.5)
RBC # FLD: 13.6 % — SIGNIFICANT CHANGE UP (ref 11.5–14.5)
RBC # FLD: 13.7 % — SIGNIFICANT CHANGE UP (ref 11.5–14.5)
RBC # FLD: 13.7 % — SIGNIFICANT CHANGE UP (ref 11.5–14.5)
RBC # FLD: 14.3 % — SIGNIFICANT CHANGE UP (ref 11.5–14.5)
RBC # FLD: 14.5 % — SIGNIFICANT CHANGE UP (ref 11.5–14.5)
RBC # FLD: 17.1 % — HIGH (ref 11.5–14.5)
RBC # FLD: 17.4 % — HIGH (ref 11.5–14.5)
RBC # FLD: 17.9 % — HIGH (ref 11.5–14.5)
RBC BLD AUTO: ABNORMAL
RBC BLD AUTO: ABNORMAL
SAO2 % BLDV: 26.7 % — SIGNIFICANT CHANGE UP
SARS-COV-2 RNA SPEC QL NAA+PROBE: SIGNIFICANT CHANGE UP
SMUDGE CELLS # BLD: PRESENT — SIGNIFICANT CHANGE UP
SODIUM SERPL-SCNC: 129 MMOL/L — LOW (ref 135–146)
SODIUM SERPL-SCNC: 130 MMOL/L — LOW (ref 135–146)
SODIUM SERPL-SCNC: 131 MMOL/L — LOW (ref 135–146)
SODIUM SERPL-SCNC: 132 MMOL/L — LOW (ref 135–146)
SODIUM SERPL-SCNC: 132 MMOL/L — LOW (ref 135–146)
SODIUM SERPL-SCNC: 134 MMOL/L — LOW (ref 135–146)
SODIUM SERPL-SCNC: 135 MMOL/L — SIGNIFICANT CHANGE UP (ref 135–146)
SODIUM SERPL-SCNC: 137 MMOL/L — SIGNIFICANT CHANGE UP (ref 135–146)
SODIUM SERPL-SCNC: 137 MMOL/L — SIGNIFICANT CHANGE UP (ref 135–146)
SODIUM SERPL-SCNC: 138 MMOL/L — SIGNIFICANT CHANGE UP (ref 135–146)
SODIUM SERPL-SCNC: 138 MMOL/L — SIGNIFICANT CHANGE UP (ref 135–146)
SODIUM SERPL-SCNC: 147 MMOL/L — HIGH (ref 135–146)
SODIUM SERPL-SCNC: 165 MMOL/L — CRITICAL HIGH (ref 135–146)
TRIGL SERPL-MCNC: 58 MG/DL — SIGNIFICANT CHANGE UP
TROPONIN T SERPL-MCNC: 0.06 NG/ML — CRITICAL HIGH
TROPONIN T SERPL-MCNC: 0.07 NG/ML — CRITICAL HIGH
TROPONIN T SERPL-MCNC: 0.07 NG/ML — CRITICAL HIGH
TROPONIN T SERPL-MCNC: 0.08 NG/ML — CRITICAL HIGH
TROPONIN T SERPL-MCNC: 1.03 NG/ML — CRITICAL HIGH
TROPONIN T SERPL-MCNC: 2.11 NG/ML — CRITICAL HIGH
TSH SERPL-MCNC: 3.46 UIU/ML — SIGNIFICANT CHANGE UP (ref 0.27–4.2)
WBC # BLD: 10.57 K/UL — SIGNIFICANT CHANGE UP (ref 4.8–10.8)
WBC # BLD: 11.47 K/UL — HIGH (ref 4.8–10.8)
WBC # BLD: 4.08 K/UL — LOW (ref 4.8–10.8)
WBC # BLD: 4.55 K/UL — LOW (ref 4.8–10.8)
WBC # BLD: 4.64 K/UL — LOW (ref 4.8–10.8)
WBC # BLD: 5.02 K/UL — SIGNIFICANT CHANGE UP (ref 4.8–10.8)
WBC # BLD: 5.07 K/UL — SIGNIFICANT CHANGE UP (ref 4.8–10.8)
WBC # BLD: 5.3 K/UL — SIGNIFICANT CHANGE UP (ref 4.8–10.8)
WBC # BLD: 5.74 K/UL — SIGNIFICANT CHANGE UP (ref 4.8–10.8)
WBC # BLD: 9.22 K/UL — SIGNIFICANT CHANGE UP (ref 4.8–10.8)
WBC # FLD AUTO: 10.57 K/UL — SIGNIFICANT CHANGE UP (ref 4.8–10.8)
WBC # FLD AUTO: 11.47 K/UL — HIGH (ref 4.8–10.8)
WBC # FLD AUTO: 4.08 K/UL — LOW (ref 4.8–10.8)
WBC # FLD AUTO: 4.55 K/UL — LOW (ref 4.8–10.8)
WBC # FLD AUTO: 4.64 K/UL — LOW (ref 4.8–10.8)
WBC # FLD AUTO: 5.02 K/UL — SIGNIFICANT CHANGE UP (ref 4.8–10.8)
WBC # FLD AUTO: 5.07 K/UL — SIGNIFICANT CHANGE UP (ref 4.8–10.8)
WBC # FLD AUTO: 5.3 K/UL — SIGNIFICANT CHANGE UP (ref 4.8–10.8)
WBC # FLD AUTO: 5.74 K/UL — SIGNIFICANT CHANGE UP (ref 4.8–10.8)
WBC # FLD AUTO: 9.22 K/UL — SIGNIFICANT CHANGE UP (ref 4.8–10.8)

## 2022-01-01 PROCEDURE — 93010 ELECTROCARDIOGRAM REPORT: CPT

## 2022-01-01 PROCEDURE — 71045 X-RAY EXAM CHEST 1 VIEW: CPT | Mod: 26

## 2022-01-01 PROCEDURE — 77001 FLUOROGUIDE FOR VEIN DEVICE: CPT | Mod: 26

## 2022-01-01 PROCEDURE — 99291 CRITICAL CARE FIRST HOUR: CPT | Mod: FT,25,GC

## 2022-01-01 PROCEDURE — 78452 HT MUSCLE IMAGE SPECT MULT: CPT | Mod: 26,ME

## 2022-01-01 PROCEDURE — 99285 EMERGENCY DEPT VISIT HI MDM: CPT

## 2022-01-01 PROCEDURE — 95819 EEG AWAKE AND ASLEEP: CPT | Mod: 26

## 2022-01-01 PROCEDURE — 99291 CRITICAL CARE FIRST HOUR: CPT

## 2022-01-01 PROCEDURE — G1004: CPT

## 2022-01-01 PROCEDURE — 99239 HOSP IP/OBS DSCHRG MGMT >30: CPT

## 2022-01-01 PROCEDURE — 36556 INSERT NON-TUNNEL CV CATH: CPT | Mod: GC

## 2022-01-01 PROCEDURE — 99232 SBSQ HOSP IP/OBS MODERATE 35: CPT

## 2022-01-01 PROCEDURE — 93000 ELECTROCARDIOGRAM COMPLETE: CPT

## 2022-01-01 PROCEDURE — 99233 SBSQ HOSP IP/OBS HIGH 50: CPT

## 2022-01-01 PROCEDURE — 99204 OFFICE O/P NEW MOD 45 MIN: CPT

## 2022-01-01 PROCEDURE — 93010 ELECTROCARDIOGRAM REPORT: CPT | Mod: 77

## 2022-01-01 PROCEDURE — 99221 1ST HOSP IP/OBS SF/LOW 40: CPT

## 2022-01-01 PROCEDURE — 36558 INSERT TUNNELED CV CATH: CPT

## 2022-01-01 PROCEDURE — 99348 HOME/RES VST EST LOW MDM 30: CPT | Mod: 95

## 2022-01-01 PROCEDURE — 99214 OFFICE O/P EST MOD 30 MIN: CPT

## 2022-01-01 PROCEDURE — 99223 1ST HOSP IP/OBS HIGH 75: CPT

## 2022-01-01 PROCEDURE — 32551 INSERTION OF CHEST TUBE: CPT | Mod: GC

## 2022-01-01 PROCEDURE — 93923 UPR/LXTR ART STDY 3+ LVLS: CPT

## 2022-01-01 PROCEDURE — 76937 US GUIDE VASCULAR ACCESS: CPT | Mod: 26

## 2022-01-01 PROCEDURE — 99213 OFFICE O/P EST LOW 20 MIN: CPT | Mod: 95

## 2022-01-01 PROCEDURE — 93306 TTE W/DOPPLER COMPLETE: CPT | Mod: 26

## 2022-01-01 PROCEDURE — 76705 ECHO EXAM OF ABDOMEN: CPT | Mod: 26

## 2022-01-01 PROCEDURE — 76937 US GUIDE VASCULAR ACCESS: CPT | Mod: 26,GC

## 2022-01-01 PROCEDURE — 70450 CT HEAD/BRAIN W/O DYE: CPT | Mod: 26

## 2022-01-01 PROCEDURE — 71046 X-RAY EXAM CHEST 2 VIEWS: CPT | Mod: 26

## 2022-01-01 PROCEDURE — 93306 TTE W/DOPPLER COMPLETE: CPT

## 2022-01-01 RX ORDER — SODIUM BICARBONATE 1 MEQ/ML
100 SYRINGE (ML) INTRAVENOUS ONCE
Refills: 0 | Status: COMPLETED | OUTPATIENT
Start: 2022-01-01 | End: 2022-01-01

## 2022-01-01 RX ORDER — HEPARIN SODIUM 5000 [USP'U]/ML
5500 INJECTION INTRAVENOUS; SUBCUTANEOUS ONCE
Refills: 0 | Status: COMPLETED | OUTPATIENT
Start: 2022-01-01 | End: 2022-01-01

## 2022-01-01 RX ORDER — ASPIRIN/CALCIUM CARB/MAGNESIUM 324 MG
81 TABLET ORAL DAILY
Refills: 0 | Status: DISCONTINUED | OUTPATIENT
Start: 2022-01-01 | End: 2022-01-01

## 2022-01-01 RX ORDER — ERYTHROPOIETIN 10000 [IU]/ML
0 INJECTION, SOLUTION INTRAVENOUS; SUBCUTANEOUS
Qty: 0 | Refills: 0 | DISCHARGE

## 2022-01-01 RX ORDER — SEVELAMER CARBONATE 2400 MG/1
1 POWDER, FOR SUSPENSION ORAL
Qty: 0 | Refills: 0 | DISCHARGE

## 2022-01-01 RX ORDER — SODIUM CHLORIDE 9 MG/ML
1000 INJECTION, SOLUTION INTRAVENOUS ONCE
Refills: 0 | Status: COMPLETED | OUTPATIENT
Start: 2022-01-01 | End: 2022-01-01

## 2022-01-01 RX ORDER — FUROSEMIDE 20 MG/1
20 TABLET ORAL
Refills: 0 | Status: ACTIVE | COMMUNITY

## 2022-01-01 RX ORDER — ERYTHROPOIETIN 10000 [IU]/ML
1 INJECTION, SOLUTION INTRAVENOUS; SUBCUTANEOUS
Qty: 0 | Refills: 0 | DISCHARGE

## 2022-01-01 RX ORDER — ERYTHROPOIETIN 10000 [IU]/ML
10000 INJECTION, SOLUTION INTRAVENOUS; SUBCUTANEOUS ONCE
Refills: 0 | Status: COMPLETED | OUTPATIENT
Start: 2022-01-01 | End: 2022-01-01

## 2022-01-01 RX ORDER — METOPROLOL SUCCINATE 200 MG/1
200 TABLET, EXTENDED RELEASE ORAL DAILY
Qty: 90 | Refills: 1 | Status: ACTIVE | COMMUNITY
Start: 1900-01-01 | End: 1900-01-01

## 2022-01-01 RX ORDER — HEPARIN SODIUM 5000 [USP'U]/ML
2500 INJECTION INTRAVENOUS; SUBCUTANEOUS EVERY 6 HOURS
Refills: 0 | Status: DISCONTINUED | OUTPATIENT
Start: 2022-01-01 | End: 2022-01-01

## 2022-01-01 RX ORDER — CHLORHEXIDINE GLUCONATE 213 G/1000ML
15 SOLUTION TOPICAL EVERY 12 HOURS
Refills: 0 | Status: DISCONTINUED | OUTPATIENT
Start: 2022-01-01 | End: 2022-01-01

## 2022-01-01 RX ORDER — CALCITRIOL 0.5 UG/1
0.5 CAPSULE, LIQUID FILLED ORAL TWICE DAILY
Refills: 0 | Status: ACTIVE | COMMUNITY

## 2022-01-01 RX ORDER — TRAMADOL HYDROCHLORIDE 50 MG/1
25 TABLET ORAL ONCE
Refills: 0 | Status: DISCONTINUED | OUTPATIENT
Start: 2022-01-01 | End: 2022-01-01

## 2022-01-01 RX ORDER — SODIUM BICARBONATE 1 MEQ/ML
50 SYRINGE (ML) INTRAVENOUS
Refills: 0 | Status: COMPLETED | OUTPATIENT
Start: 2022-01-01 | End: 2022-01-01

## 2022-01-01 RX ORDER — METOPROLOL TARTRATE 50 MG
1 TABLET ORAL
Qty: 0 | Refills: 0 | DISCHARGE

## 2022-01-01 RX ORDER — CHLORHEXIDINE GLUCONATE 213 G/1000ML
1 SOLUTION TOPICAL
Refills: 0 | Status: DISCONTINUED | OUTPATIENT
Start: 2022-01-01 | End: 2022-01-01

## 2022-01-01 RX ORDER — ERGOCALCIFEROL 1.25 MG/1
1 CAPSULE ORAL
Qty: 0 | Refills: 0 | DISCHARGE

## 2022-01-01 RX ORDER — SODIUM BICARBONATE 1 MEQ/ML
1300 SYRINGE (ML) INTRAVENOUS THREE TIMES A DAY
Refills: 0 | Status: DISCONTINUED | OUTPATIENT
Start: 2022-01-01 | End: 2022-01-01

## 2022-01-01 RX ORDER — CALCITRIOL 0.5 UG/1
2 CAPSULE ORAL
Qty: 0 | Refills: 0 | DISCHARGE
Start: 2022-01-01

## 2022-01-01 RX ORDER — FENTANYL CITRATE 50 UG/ML
0.5 INJECTION INTRAVENOUS
Qty: 2500 | Refills: 0 | Status: DISCONTINUED | OUTPATIENT
Start: 2022-01-01 | End: 2022-01-01

## 2022-01-01 RX ORDER — INSULIN ASPART 100 [IU]/ML
10 INJECTION, SOLUTION SUBCUTANEOUS
Qty: 0 | Refills: 0 | DISCHARGE

## 2022-01-01 RX ORDER — NOREPINEPHRINE BITARTRATE/D5W 8 MG/250ML
0.05 PLASTIC BAG, INJECTION (ML) INTRAVENOUS
Qty: 32 | Refills: 0 | Status: DISCONTINUED | OUTPATIENT
Start: 2022-01-01 | End: 2022-01-01

## 2022-01-01 RX ORDER — FUROSEMIDE 40 MG
80 TABLET ORAL
Refills: 0 | Status: DISCONTINUED | OUTPATIENT
Start: 2022-01-01 | End: 2022-01-01

## 2022-01-01 RX ORDER — CARBAMAZEPINE 200 MG
200 TABLET ORAL DAILY
Refills: 0 | Status: DISCONTINUED | OUTPATIENT
Start: 2022-01-01 | End: 2022-01-01

## 2022-01-01 RX ORDER — CLOPIDOGREL BISULFATE 75 MG/1
75 TABLET, FILM COATED ORAL EVERY 24 HOURS
Refills: 0 | Status: DISCONTINUED | OUTPATIENT
Start: 2022-01-01 | End: 2022-01-01

## 2022-01-01 RX ORDER — ATORVASTATIN CALCIUM 80 MG/1
10 TABLET, FILM COATED ORAL AT BEDTIME
Refills: 0 | Status: DISCONTINUED | OUTPATIENT
Start: 2022-01-01 | End: 2022-01-01

## 2022-01-01 RX ORDER — CARBAMAZEPINE 200 MG
1 TABLET ORAL
Qty: 14 | Refills: 0
Start: 2022-01-01 | End: 2022-01-01

## 2022-01-01 RX ORDER — SEVELAMER HYDROCHLORIDE 800 MG/1
800 TABLET, FILM COATED ORAL 3 TIMES DAILY
Refills: 0 | Status: ACTIVE | COMMUNITY

## 2022-01-01 RX ORDER — POTASSIUM CHLORIDE 20 MEQ
40 PACKET (EA) ORAL ONCE
Refills: 0 | Status: COMPLETED | OUTPATIENT
Start: 2022-01-01 | End: 2022-01-01

## 2022-01-01 RX ORDER — SODIUM BICARBONATE 1 MEQ/ML
50 SYRINGE (ML) INTRAVENOUS ONCE
Refills: 0 | Status: COMPLETED | OUTPATIENT
Start: 2022-01-01 | End: 2022-01-01

## 2022-01-01 RX ORDER — DEXTROSE 10 % IN WATER 10 %
250 INTRAVENOUS SOLUTION INTRAVENOUS ONCE
Refills: 0 | Status: COMPLETED | OUTPATIENT
Start: 2022-01-01 | End: 2022-01-01

## 2022-01-01 RX ORDER — CALAMINE AND ZINC OXIDE AND PHENOL 160; 10 MG/ML; MG/ML
1 LOTION TOPICAL ONCE
Refills: 0 | Status: COMPLETED | OUTPATIENT
Start: 2022-01-01 | End: 2022-01-01

## 2022-01-01 RX ORDER — ONDANSETRON 8 MG/1
4 TABLET, FILM COATED ORAL EVERY 8 HOURS
Refills: 0 | Status: DISCONTINUED | OUTPATIENT
Start: 2022-01-01 | End: 2022-01-01

## 2022-01-01 RX ORDER — SODIUM BICARBONATE 1 MEQ/ML
200 SYRINGE (ML) INTRAVENOUS ONCE
Refills: 0 | Status: COMPLETED | OUTPATIENT
Start: 2022-01-01 | End: 2022-01-01

## 2022-01-01 RX ORDER — DEXTROSE 10 % IN WATER 10 %
250 INTRAVENOUS SOLUTION INTRAVENOUS ONCE
Refills: 0 | Status: DISCONTINUED | OUTPATIENT
Start: 2022-01-01 | End: 2022-01-01

## 2022-01-01 RX ORDER — SEVELAMER CARBONATE 800 MG/1
800 TABLET, FILM COATED ORAL
Refills: 0 | Status: ACTIVE | COMMUNITY

## 2022-01-01 RX ORDER — FUROSEMIDE 40 MG
1 TABLET ORAL
Qty: 0 | Refills: 0 | DISCHARGE

## 2022-01-01 RX ORDER — AMIODARONE HYDROCHLORIDE 400 MG/1
1 TABLET ORAL
Qty: 900 | Refills: 0 | Status: DISCONTINUED | OUTPATIENT
Start: 2022-01-01 | End: 2022-01-01

## 2022-01-01 RX ORDER — SACUBITRIL AND VALSARTAN 24; 26 MG/1; MG/1
24-26 TABLET, FILM COATED ORAL
Qty: 180 | Refills: 1 | Status: ACTIVE | COMMUNITY
Start: 1900-01-01 | End: 1900-01-01

## 2022-01-01 RX ORDER — INFLUENZA VIRUS VACCINE 15; 15; 15; 15 UG/.5ML; UG/.5ML; UG/.5ML; UG/.5ML
0.7 SUSPENSION INTRAMUSCULAR ONCE
Refills: 0 | Status: DISCONTINUED | OUTPATIENT
Start: 2022-01-01 | End: 2022-01-01

## 2022-01-01 RX ORDER — AMIODARONE HYDROCHLORIDE 400 MG/1
0.5 TABLET ORAL
Qty: 900 | Refills: 0 | Status: DISCONTINUED | OUTPATIENT
Start: 2022-01-01 | End: 2022-01-01

## 2022-01-01 RX ORDER — LANOLIN ALCOHOL/MO/W.PET/CERES
5 CREAM (GRAM) TOPICAL AT BEDTIME
Refills: 0 | Status: DISCONTINUED | OUTPATIENT
Start: 2022-01-01 | End: 2022-01-01

## 2022-01-01 RX ORDER — HEPARIN SODIUM 5000 [USP'U]/ML
INJECTION INTRAVENOUS; SUBCUTANEOUS
Qty: 25000 | Refills: 0 | Status: DISCONTINUED | OUTPATIENT
Start: 2022-01-01 | End: 2022-01-01

## 2022-01-01 RX ORDER — IRON SUCROSE 20 MG/ML
100 INJECTION, SOLUTION INTRAVENOUS ONCE
Refills: 0 | Status: COMPLETED | OUTPATIENT
Start: 2022-01-01 | End: 2022-01-01

## 2022-01-01 RX ORDER — INSULIN LISPRO 100/ML
3 VIAL (ML) SUBCUTANEOUS
Refills: 0 | Status: DISCONTINUED | OUTPATIENT
Start: 2022-01-01 | End: 2022-01-01

## 2022-01-01 RX ORDER — AMPICILLIN SODIUM AND SULBACTAM SODIUM 250; 125 MG/ML; MG/ML
3 INJECTION, POWDER, FOR SUSPENSION INTRAMUSCULAR; INTRAVENOUS EVERY 24 HOURS
Refills: 0 | Status: DISCONTINUED | OUTPATIENT
Start: 2022-01-01 | End: 2022-01-01

## 2022-01-01 RX ORDER — HEPARIN SODIUM 5000 [USP'U]/ML
5500 INJECTION INTRAVENOUS; SUBCUTANEOUS EVERY 6 HOURS
Refills: 0 | Status: DISCONTINUED | OUTPATIENT
Start: 2022-01-01 | End: 2022-01-01

## 2022-01-01 RX ORDER — SODIUM BICARBONATE 1 MEQ/ML
0.11 SYRINGE (ML) INTRAVENOUS
Qty: 150 | Refills: 0 | Status: DISCONTINUED | OUTPATIENT
Start: 2022-01-01 | End: 2022-01-01

## 2022-01-01 RX ORDER — ATORVASTATIN CALCIUM 10 MG/1
10 TABLET, FILM COATED ORAL
Qty: 90 | Refills: 1 | Status: ACTIVE | COMMUNITY
Start: 1900-01-01 | End: 1900-01-01

## 2022-01-01 RX ORDER — FUROSEMIDE 20 MG/1
20 TABLET ORAL
Refills: 0 | Status: DISCONTINUED | COMMUNITY
End: 2022-01-01

## 2022-01-01 RX ORDER — AMIODARONE HYDROCHLORIDE 400 MG/1
200 TABLET ORAL EVERY 12 HOURS
Refills: 0 | Status: DISCONTINUED | OUTPATIENT
Start: 2022-01-01 | End: 2022-01-01

## 2022-01-01 RX ORDER — METOPROLOL TARTRATE 50 MG
100 TABLET ORAL DAILY
Refills: 0 | Status: DISCONTINUED | OUTPATIENT
Start: 2022-01-01 | End: 2022-01-01

## 2022-01-01 RX ORDER — CINACALCET 30 MG/1
30 TABLET, FILM COATED ORAL DAILY
Refills: 0 | Status: DISCONTINUED | OUTPATIENT
Start: 2022-01-01 | End: 2022-01-01

## 2022-01-01 RX ORDER — SACUBITRIL AND VALSARTAN 24; 26 MG/1; MG/1
1 TABLET, FILM COATED ORAL
Refills: 0 | Status: DISCONTINUED | OUTPATIENT
Start: 2022-01-01 | End: 2022-01-01

## 2022-01-01 RX ORDER — SEVELAMER CARBONATE 2400 MG/1
800 POWDER, FOR SUSPENSION ORAL
Refills: 0 | Status: DISCONTINUED | OUTPATIENT
Start: 2022-01-01 | End: 2022-01-01

## 2022-01-01 RX ORDER — CALCITRIOL 0.5 UG/1
1 CAPSULE ORAL DAILY
Refills: 0 | Status: DISCONTINUED | OUTPATIENT
Start: 2022-01-01 | End: 2022-01-01

## 2022-01-01 RX ORDER — VASOPRESSIN 20 [USP'U]/ML
0.04 INJECTION INTRAVENOUS
Qty: 50 | Refills: 0 | Status: DISCONTINUED | OUTPATIENT
Start: 2022-01-01 | End: 2022-01-01

## 2022-01-01 RX ORDER — PANTOPRAZOLE SODIUM 20 MG/1
40 TABLET, DELAYED RELEASE ORAL
Refills: 0 | Status: DISCONTINUED | OUTPATIENT
Start: 2022-01-01 | End: 2022-01-01

## 2022-01-01 RX ORDER — INSULIN GLARGINE 100 [IU]/ML
10 INJECTION, SOLUTION SUBCUTANEOUS
Qty: 0 | Refills: 0 | DISCHARGE
Start: 2022-01-01

## 2022-01-01 RX ORDER — FUROSEMIDE 40 MG
40 TABLET ORAL ONCE
Refills: 0 | Status: COMPLETED | OUTPATIENT
Start: 2022-01-01 | End: 2022-01-01

## 2022-01-01 RX ORDER — POTASSIUM CHLORIDE 20 MEQ
40 PACKET (EA) ORAL ONCE
Refills: 0 | Status: DISCONTINUED | OUTPATIENT
Start: 2022-01-01 | End: 2022-01-01

## 2022-01-01 RX ORDER — LANOLIN ALCOHOL/MO/W.PET/CERES
3 CREAM (GRAM) TOPICAL AT BEDTIME
Refills: 0 | Status: DISCONTINUED | OUTPATIENT
Start: 2022-01-01 | End: 2022-01-01

## 2022-01-01 RX ORDER — INSULIN ASPART 100 [IU]/ML
3 INJECTION, SOLUTION SUBCUTANEOUS
Qty: 0 | Refills: 0 | DISCHARGE

## 2022-01-01 RX ORDER — PHENYLEPHRINE HYDROCHLORIDE 10 MG/ML
0.1 INJECTION INTRAVENOUS
Qty: 160 | Refills: 0 | Status: DISCONTINUED | OUTPATIENT
Start: 2022-01-01 | End: 2022-01-01

## 2022-01-01 RX ORDER — INSULIN GLARGINE 100 [IU]/ML
10 INJECTION, SOLUTION SUBCUTANEOUS AT BEDTIME
Refills: 0 | Status: DISCONTINUED | OUTPATIENT
Start: 2022-01-01 | End: 2022-01-01

## 2022-01-01 RX ORDER — CINACALCET 30 MG/1
30 TABLET ORAL DAILY
Refills: 0 | Status: ACTIVE | COMMUNITY

## 2022-01-01 RX ORDER — ASPIRIN/CALCIUM CARB/MAGNESIUM 324 MG
325 TABLET ORAL ONCE
Refills: 0 | Status: COMPLETED | OUTPATIENT
Start: 2022-01-01 | End: 2022-01-01

## 2022-01-01 RX ORDER — AMPICILLIN SODIUM AND SULBACTAM SODIUM 250; 125 MG/ML; MG/ML
3 INJECTION, POWDER, FOR SUSPENSION INTRAMUSCULAR; INTRAVENOUS EVERY 12 HOURS
Refills: 0 | Status: DISCONTINUED | OUTPATIENT
Start: 2022-01-01 | End: 2022-01-01

## 2022-01-01 RX ORDER — HEPARIN SODIUM 5000 [USP'U]/ML
5000 INJECTION INTRAVENOUS; SUBCUTANEOUS EVERY 8 HOURS
Refills: 0 | Status: DISCONTINUED | OUTPATIENT
Start: 2022-01-01 | End: 2022-01-01

## 2022-01-01 RX ORDER — CARBAMAZEPINE 200 MG/1
200 TABLET ORAL DAILY
Refills: 0 | Status: ACTIVE | COMMUNITY

## 2022-01-01 RX ORDER — CINACALCET 30 MG/1
1 TABLET, FILM COATED ORAL
Qty: 0 | Refills: 0 | DISCHARGE

## 2022-01-01 RX ORDER — ACETAMINOPHEN 500 MG
650 TABLET ORAL EVERY 6 HOURS
Refills: 0 | Status: DISCONTINUED | OUTPATIENT
Start: 2022-01-01 | End: 2022-01-01

## 2022-01-01 RX ORDER — INSULIN GLARGINE 100 [IU]/ML
100 INJECTION, SOLUTION SUBCUTANEOUS
Refills: 0 | Status: ACTIVE | COMMUNITY

## 2022-01-01 RX ORDER — INSULIN LISPRO 100/ML
VIAL (ML) SUBCUTANEOUS
Refills: 0 | Status: DISCONTINUED | OUTPATIENT
Start: 2022-01-01 | End: 2022-01-01

## 2022-01-01 RX ORDER — SACUBITRIL AND VALSARTAN 24; 26 MG/1; MG/1
1 TABLET, FILM COATED ORAL
Qty: 30 | Refills: 0
Start: 2022-01-01 | End: 2022-01-01

## 2022-01-01 RX ORDER — SODIUM BICARBONATE 650 MG/1
650 TABLET ORAL 3 TIMES DAILY
Refills: 0 | Status: ACTIVE | COMMUNITY

## 2022-01-01 RX ORDER — MAGNESIUM SULFATE 500 MG/ML
2 VIAL (ML) INJECTION ONCE
Refills: 0 | Status: COMPLETED | OUTPATIENT
Start: 2022-01-01 | End: 2022-01-01

## 2022-01-01 RX ORDER — INSULIN ASPART 100 [IU]/ML
100 INJECTION, SOLUTION INTRAVENOUS; SUBCUTANEOUS
Refills: 0 | Status: ACTIVE | COMMUNITY

## 2022-01-01 RX ADMIN — Medication 650 MILLIGRAM(S): at 23:18

## 2022-01-01 RX ADMIN — CHLORHEXIDINE GLUCONATE 15 MILLILITER(S): 213 SOLUTION TOPICAL at 05:51

## 2022-01-01 RX ADMIN — Medication 1300 MILLIGRAM(S): at 17:34

## 2022-01-01 RX ADMIN — Medication 80 MILLIGRAM(S): at 22:19

## 2022-01-01 RX ADMIN — Medication 200 MILLIGRAM(S): at 11:20

## 2022-01-01 RX ADMIN — Medication 81 MILLIGRAM(S): at 14:42

## 2022-01-01 RX ADMIN — Medication 3 UNIT(S): at 08:13

## 2022-01-01 RX ADMIN — Medication 80 MILLIGRAM(S): at 17:32

## 2022-01-01 RX ADMIN — Medication 81 MILLIGRAM(S): at 11:31

## 2022-01-01 RX ADMIN — Medication 100 MILLIGRAM(S): at 05:21

## 2022-01-01 RX ADMIN — Medication 3 UNIT(S): at 17:08

## 2022-01-01 RX ADMIN — SEVELAMER CARBONATE 800 MILLIGRAM(S): 2400 POWDER, FOR SUSPENSION ORAL at 16:52

## 2022-01-01 RX ADMIN — Medication 1300 MILLIGRAM(S): at 13:57

## 2022-01-01 RX ADMIN — Medication 1300 MILLIGRAM(S): at 21:34

## 2022-01-01 RX ADMIN — Medication 80 MILLIGRAM(S): at 17:10

## 2022-01-01 RX ADMIN — Medication 1300 MILLIGRAM(S): at 05:26

## 2022-01-01 RX ADMIN — CINACALCET 30 MILLIGRAM(S): 30 TABLET, FILM COATED ORAL at 17:32

## 2022-01-01 RX ADMIN — PHENYLEPHRINE HYDROCHLORIDE 1.31 MICROGRAM(S)/KG/MIN: 10 INJECTION INTRAVENOUS at 21:23

## 2022-01-01 RX ADMIN — Medication 81 MILLIGRAM(S): at 18:48

## 2022-01-01 RX ADMIN — Medication 80 MILLIGRAM(S): at 05:27

## 2022-01-01 RX ADMIN — Medication 3 UNIT(S): at 17:10

## 2022-01-01 RX ADMIN — Medication 80 MILLIGRAM(S): at 05:20

## 2022-01-01 RX ADMIN — Medication 650 MILLIGRAM(S): at 04:16

## 2022-01-01 RX ADMIN — Medication 200 MILLIEQUIVALENT(S): at 21:41

## 2022-01-01 RX ADMIN — Medication 650 MILLIGRAM(S): at 07:21

## 2022-01-01 RX ADMIN — Medication 3 UNIT(S): at 12:44

## 2022-01-01 RX ADMIN — HEPARIN SODIUM 5000 UNIT(S): 5000 INJECTION INTRAVENOUS; SUBCUTANEOUS at 05:22

## 2022-01-01 RX ADMIN — Medication 10: at 17:09

## 2022-01-01 RX ADMIN — HEPARIN SODIUM 5000 UNIT(S): 5000 INJECTION INTRAVENOUS; SUBCUTANEOUS at 05:27

## 2022-01-01 RX ADMIN — Medication 50 MILLIEQUIVALENT(S): at 04:30

## 2022-01-01 RX ADMIN — CALAMINE AND ZINC OXIDE AND PHENOL 1 APPLICATION(S): 160; 10 LOTION TOPICAL at 17:06

## 2022-01-01 RX ADMIN — HEPARIN SODIUM 5000 UNIT(S): 5000 INJECTION INTRAVENOUS; SUBCUTANEOUS at 17:34

## 2022-01-01 RX ADMIN — Medication 2: at 13:12

## 2022-01-01 RX ADMIN — CHLORHEXIDINE GLUCONATE 1 APPLICATION(S): 213 SOLUTION TOPICAL at 05:21

## 2022-01-01 RX ADMIN — SACUBITRIL AND VALSARTAN 1 TABLET(S): 24; 26 TABLET, FILM COATED ORAL at 05:05

## 2022-01-01 RX ADMIN — INSULIN GLARGINE 10 UNIT(S): 100 INJECTION, SOLUTION SUBCUTANEOUS at 21:45

## 2022-01-01 RX ADMIN — Medication 100 MILLIGRAM(S): at 05:46

## 2022-01-01 RX ADMIN — Medication 80 MILLIGRAM(S): at 06:06

## 2022-01-01 RX ADMIN — HEPARIN SODIUM 5000 UNIT(S): 5000 INJECTION INTRAVENOUS; SUBCUTANEOUS at 15:00

## 2022-01-01 RX ADMIN — SEVELAMER CARBONATE 800 MILLIGRAM(S): 2400 POWDER, FOR SUSPENSION ORAL at 17:46

## 2022-01-01 RX ADMIN — CHLORHEXIDINE GLUCONATE 1 APPLICATION(S): 213 SOLUTION TOPICAL at 05:09

## 2022-01-01 RX ADMIN — Medication 1300 MILLIGRAM(S): at 21:47

## 2022-01-01 RX ADMIN — SEVELAMER CARBONATE 800 MILLIGRAM(S): 2400 POWDER, FOR SUSPENSION ORAL at 17:10

## 2022-01-01 RX ADMIN — INSULIN GLARGINE 10 UNIT(S): 100 INJECTION, SOLUTION SUBCUTANEOUS at 21:47

## 2022-01-01 RX ADMIN — CHLORHEXIDINE GLUCONATE 15 MILLILITER(S): 213 SOLUTION TOPICAL at 20:32

## 2022-01-01 RX ADMIN — Medication 200 MILLIGRAM(S): at 18:48

## 2022-01-01 RX ADMIN — ATORVASTATIN CALCIUM 10 MILLIGRAM(S): 80 TABLET, FILM COATED ORAL at 21:45

## 2022-01-01 RX ADMIN — HEPARIN SODIUM 5000 UNIT(S): 5000 INJECTION INTRAVENOUS; SUBCUTANEOUS at 13:54

## 2022-01-01 RX ADMIN — ATORVASTATIN CALCIUM 10 MILLIGRAM(S): 80 TABLET, FILM COATED ORAL at 21:16

## 2022-01-01 RX ADMIN — CALCITRIOL 1 MICROGRAM(S): 0.5 CAPSULE ORAL at 12:01

## 2022-01-01 RX ADMIN — Medication 100 MILLIGRAM(S): at 06:06

## 2022-01-01 RX ADMIN — HEPARIN SODIUM 5000 UNIT(S): 5000 INJECTION INTRAVENOUS; SUBCUTANEOUS at 05:06

## 2022-01-01 RX ADMIN — Medication 80 MILLIGRAM(S): at 05:45

## 2022-01-01 RX ADMIN — Medication 100 MILLIGRAM(S): at 05:22

## 2022-01-01 RX ADMIN — Medication 3 MILLIGRAM(S): at 02:01

## 2022-01-01 RX ADMIN — Medication 40 MILLIEQUIVALENT(S): at 15:00

## 2022-01-01 RX ADMIN — SEVELAMER CARBONATE 800 MILLIGRAM(S): 2400 POWDER, FOR SUSPENSION ORAL at 08:19

## 2022-01-01 RX ADMIN — Medication 2: at 16:53

## 2022-01-01 RX ADMIN — Medication 3 UNIT(S): at 16:53

## 2022-01-01 RX ADMIN — SEVELAMER CARBONATE 800 MILLIGRAM(S): 2400 POWDER, FOR SUSPENSION ORAL at 17:35

## 2022-01-01 RX ADMIN — Medication 3.28 MICROGRAM(S)/KG/MIN: at 17:40

## 2022-01-01 RX ADMIN — Medication 80 MILLIGRAM(S): at 05:22

## 2022-01-01 RX ADMIN — Medication 80 MILLIGRAM(S): at 05:05

## 2022-01-01 RX ADMIN — HEPARIN SODIUM 5000 UNIT(S): 5000 INJECTION INTRAVENOUS; SUBCUTANEOUS at 14:45

## 2022-01-01 RX ADMIN — Medication 50 MEQ/KG/HR: at 20:15

## 2022-01-01 RX ADMIN — Medication 650 MILLIGRAM(S): at 22:45

## 2022-01-01 RX ADMIN — CALCITRIOL 1 MICROGRAM(S): 0.5 CAPSULE ORAL at 14:42

## 2022-01-01 RX ADMIN — VASOPRESSIN 2.4 UNIT(S)/MIN: 20 INJECTION INTRAVENOUS at 18:29

## 2022-01-01 RX ADMIN — Medication 80 MILLIGRAM(S): at 17:07

## 2022-01-01 RX ADMIN — CINACALCET 30 MILLIGRAM(S): 30 TABLET, FILM COATED ORAL at 14:42

## 2022-01-01 RX ADMIN — CINACALCET 30 MILLIGRAM(S): 30 TABLET, FILM COATED ORAL at 13:10

## 2022-01-01 RX ADMIN — Medication 1300 MILLIGRAM(S): at 06:06

## 2022-01-01 RX ADMIN — Medication 25 GRAM(S): at 11:31

## 2022-01-01 RX ADMIN — Medication 3 UNIT(S): at 11:54

## 2022-01-01 RX ADMIN — Medication 1000 MILLILITER(S): at 22:50

## 2022-01-01 RX ADMIN — Medication 50 MILLIEQUIVALENT(S): at 04:29

## 2022-01-01 RX ADMIN — Medication 200 MILLIGRAM(S): at 12:44

## 2022-01-01 RX ADMIN — TRAMADOL HYDROCHLORIDE 25 MILLIGRAM(S): 50 TABLET ORAL at 22:50

## 2022-01-01 RX ADMIN — SEVELAMER CARBONATE 800 MILLIGRAM(S): 2400 POWDER, FOR SUSPENSION ORAL at 13:11

## 2022-01-01 RX ADMIN — HEPARIN SODIUM 5500 UNIT(S): 5000 INJECTION INTRAVENOUS; SUBCUTANEOUS at 18:02

## 2022-01-01 RX ADMIN — Medication 650 MILLIGRAM(S): at 06:57

## 2022-01-01 RX ADMIN — SEVELAMER CARBONATE 800 MILLIGRAM(S): 2400 POWDER, FOR SUSPENSION ORAL at 11:30

## 2022-01-01 RX ADMIN — HEPARIN SODIUM 5000 UNIT(S): 5000 INJECTION INTRAVENOUS; SUBCUTANEOUS at 21:16

## 2022-01-01 RX ADMIN — CINACALCET 30 MILLIGRAM(S): 30 TABLET, FILM COATED ORAL at 11:30

## 2022-01-01 RX ADMIN — Medication 81 MILLIGRAM(S): at 12:02

## 2022-01-01 RX ADMIN — SEVELAMER CARBONATE 800 MILLIGRAM(S): 2400 POWDER, FOR SUSPENSION ORAL at 08:15

## 2022-01-01 RX ADMIN — Medication 81 MILLIGRAM(S): at 13:11

## 2022-01-01 RX ADMIN — Medication 4: at 17:46

## 2022-01-01 RX ADMIN — HEPARIN SODIUM 5000 UNIT(S): 5000 INJECTION INTRAVENOUS; SUBCUTANEOUS at 22:19

## 2022-01-01 RX ADMIN — INSULIN GLARGINE 10 UNIT(S): 100 INJECTION, SOLUTION SUBCUTANEOUS at 22:20

## 2022-01-01 RX ADMIN — HEPARIN SODIUM 5000 UNIT(S): 5000 INJECTION INTRAVENOUS; SUBCUTANEOUS at 05:20

## 2022-01-01 RX ADMIN — Medication 3 UNIT(S): at 17:49

## 2022-01-01 RX ADMIN — Medication 1300 MILLIGRAM(S): at 22:19

## 2022-01-01 RX ADMIN — Medication 1300 MILLIGRAM(S): at 21:16

## 2022-01-01 RX ADMIN — SEVELAMER CARBONATE 800 MILLIGRAM(S): 2400 POWDER, FOR SUSPENSION ORAL at 08:09

## 2022-01-01 RX ADMIN — CALCITRIOL 1 MICROGRAM(S): 0.5 CAPSULE ORAL at 11:30

## 2022-01-01 RX ADMIN — TRAMADOL HYDROCHLORIDE 25 MILLIGRAM(S): 50 TABLET ORAL at 22:19

## 2022-01-01 RX ADMIN — HEPARIN SODIUM 5000 UNIT(S): 5000 INJECTION INTRAVENOUS; SUBCUTANEOUS at 14:42

## 2022-01-01 RX ADMIN — Medication 6: at 12:21

## 2022-01-01 RX ADMIN — ATORVASTATIN CALCIUM 10 MILLIGRAM(S): 80 TABLET, FILM COATED ORAL at 21:34

## 2022-01-01 RX ADMIN — Medication 200 MILLIGRAM(S): at 11:58

## 2022-01-01 RX ADMIN — Medication 1300 MILLIGRAM(S): at 13:10

## 2022-01-01 RX ADMIN — SODIUM CHLORIDE 1000 MILLILITER(S): 9 INJECTION, SOLUTION INTRAVENOUS at 19:25

## 2022-01-01 RX ADMIN — Medication 0: at 08:02

## 2022-01-01 RX ADMIN — Medication 81 MILLIGRAM(S): at 11:30

## 2022-01-01 RX ADMIN — ATORVASTATIN CALCIUM 10 MILLIGRAM(S): 80 TABLET, FILM COATED ORAL at 21:47

## 2022-01-01 RX ADMIN — Medication 3 UNIT(S): at 14:44

## 2022-01-01 RX ADMIN — Medication 3 UNIT(S): at 08:08

## 2022-01-01 RX ADMIN — Medication 3 UNIT(S): at 12:20

## 2022-01-01 RX ADMIN — Medication 3 UNIT(S): at 13:12

## 2022-01-01 RX ADMIN — Medication 4: at 17:10

## 2022-01-01 RX ADMIN — Medication 3 UNIT(S): at 08:02

## 2022-01-01 RX ADMIN — SEVELAMER CARBONATE 800 MILLIGRAM(S): 2400 POWDER, FOR SUSPENSION ORAL at 17:06

## 2022-01-01 RX ADMIN — Medication 80 MILLIGRAM(S): at 17:35

## 2022-01-01 RX ADMIN — Medication 100 MILLIGRAM(S): at 05:05

## 2022-01-01 RX ADMIN — Medication 4: at 11:55

## 2022-01-01 RX ADMIN — CALCITRIOL 1 MICROGRAM(S): 0.5 CAPSULE ORAL at 11:31

## 2022-01-01 RX ADMIN — Medication 4: at 14:44

## 2022-01-01 RX ADMIN — Medication 50 MILLIEQUIVALENT(S): at 04:27

## 2022-01-01 RX ADMIN — AMPICILLIN SODIUM AND SULBACTAM SODIUM 200 GRAM(S): 250; 125 INJECTION, POWDER, FOR SUSPENSION INTRAMUSCULAR; INTRAVENOUS at 21:22

## 2022-01-01 RX ADMIN — SEVELAMER CARBONATE 800 MILLIGRAM(S): 2400 POWDER, FOR SUSPENSION ORAL at 12:02

## 2022-01-01 RX ADMIN — Medication 100 MILLIEQUIVALENT(S): at 01:30

## 2022-01-01 RX ADMIN — CHLORHEXIDINE GLUCONATE 1 APPLICATION(S): 213 SOLUTION TOPICAL at 05:46

## 2022-01-01 RX ADMIN — Medication 1300 MILLIGRAM(S): at 05:45

## 2022-01-01 RX ADMIN — Medication 80 MILLIGRAM(S): at 18:51

## 2022-01-01 RX ADMIN — INSULIN GLARGINE 10 UNIT(S): 100 INJECTION, SOLUTION SUBCUTANEOUS at 21:35

## 2022-01-01 RX ADMIN — HEPARIN SODIUM 5000 UNIT(S): 5000 INJECTION INTRAVENOUS; SUBCUTANEOUS at 06:06

## 2022-01-01 RX ADMIN — SACUBITRIL AND VALSARTAN 1 TABLET(S): 24; 26 TABLET, FILM COATED ORAL at 18:52

## 2022-01-01 RX ADMIN — HEPARIN SODIUM 5000 UNIT(S): 5000 INJECTION INTRAVENOUS; SUBCUTANEOUS at 21:45

## 2022-01-01 RX ADMIN — Medication 50 MILLIEQUIVALENT(S): at 21:22

## 2022-01-01 RX ADMIN — IRON SUCROSE 100 MILLIGRAM(S): 20 INJECTION, SOLUTION INTRAVENOUS at 13:55

## 2022-01-01 RX ADMIN — CALCITRIOL 1 MICROGRAM(S): 0.5 CAPSULE ORAL at 17:33

## 2022-01-01 RX ADMIN — HEPARIN SODIUM 5000 UNIT(S): 5000 INJECTION INTRAVENOUS; SUBCUTANEOUS at 21:33

## 2022-01-01 RX ADMIN — Medication 650 MILLIGRAM(S): at 06:06

## 2022-01-01 RX ADMIN — AMIODARONE HYDROCHLORIDE 33.3 MG/MIN: 400 TABLET ORAL at 15:08

## 2022-01-01 RX ADMIN — CHLORHEXIDINE GLUCONATE 1 APPLICATION(S): 213 SOLUTION TOPICAL at 05:23

## 2022-01-01 RX ADMIN — HEPARIN SODIUM 5000 UNIT(S): 5000 INJECTION INTRAVENOUS; SUBCUTANEOUS at 13:13

## 2022-01-01 RX ADMIN — Medication 50 MILLIEQUIVALENT(S): at 18:05

## 2022-01-01 RX ADMIN — Medication 50 MILLIEQUIVALENT(S): at 03:54

## 2022-01-01 RX ADMIN — Medication 650 MILLIGRAM(S): at 05:00

## 2022-01-01 RX ADMIN — SEVELAMER CARBONATE 800 MILLIGRAM(S): 2400 POWDER, FOR SUSPENSION ORAL at 08:02

## 2022-01-01 RX ADMIN — Medication 100 MILLIGRAM(S): at 05:26

## 2022-01-01 RX ADMIN — ATORVASTATIN CALCIUM 10 MILLIGRAM(S): 80 TABLET, FILM COATED ORAL at 22:18

## 2022-01-01 RX ADMIN — HEPARIN SODIUM 5000 UNIT(S): 5000 INJECTION INTRAVENOUS; SUBCUTANEOUS at 21:47

## 2022-01-01 RX ADMIN — INSULIN GLARGINE 10 UNIT(S): 100 INJECTION, SOLUTION SUBCUTANEOUS at 22:14

## 2022-01-01 RX ADMIN — SEVELAMER CARBONATE 800 MILLIGRAM(S): 2400 POWDER, FOR SUSPENSION ORAL at 12:32

## 2022-01-01 RX ADMIN — SEVELAMER CARBONATE 800 MILLIGRAM(S): 2400 POWDER, FOR SUSPENSION ORAL at 08:14

## 2022-01-01 RX ADMIN — ATORVASTATIN CALCIUM 10 MILLIGRAM(S): 80 TABLET, FILM COATED ORAL at 22:17

## 2022-01-01 RX ADMIN — CALCITRIOL 1 MICROGRAM(S): 0.5 CAPSULE ORAL at 13:11

## 2022-01-01 RX ADMIN — Medication 40 MILLIGRAM(S): at 12:10

## 2022-01-01 RX ADMIN — Medication 200 MILLIGRAM(S): at 14:43

## 2022-01-01 RX ADMIN — Medication 1300 MILLIGRAM(S): at 05:21

## 2022-01-01 RX ADMIN — ERYTHROPOIETIN 10000 UNIT(S): 10000 INJECTION, SOLUTION INTRAVENOUS; SUBCUTANEOUS at 13:55

## 2022-01-01 RX ADMIN — Medication 650 MILLIGRAM(S): at 05:05

## 2022-01-01 RX ADMIN — Medication 650 MILLIGRAM(S): at 22:11

## 2022-01-01 RX ADMIN — HEPARIN SODIUM 1200 UNIT(S)/HR: 5000 INJECTION INTRAVENOUS; SUBCUTANEOUS at 18:22

## 2022-01-01 RX ADMIN — CINACALCET 30 MILLIGRAM(S): 30 TABLET, FILM COATED ORAL at 11:59

## 2022-01-01 RX ADMIN — SEVELAMER CARBONATE 800 MILLIGRAM(S): 2400 POWDER, FOR SUSPENSION ORAL at 14:43

## 2022-01-01 RX ADMIN — Medication 0: at 08:09

## 2022-01-01 RX ADMIN — Medication 650 MILLIGRAM(S): at 21:45

## 2022-01-14 PROBLEM — E78.5 HYPERLIPIDEMIA: Status: ACTIVE | Noted: 2022-01-01

## 2022-01-14 PROBLEM — I25.2 OLD MYOCARDIAL INFARCT: Status: ACTIVE | Noted: 2022-01-01

## 2022-01-14 PROBLEM — Z87.891 FORMER SMOKER: Status: ACTIVE | Noted: 2022-01-01

## 2022-01-28 PROBLEM — Z01.810 ENCOUNTER FOR PRE-OPERATIVE CARDIOVASCULAR CLEARANCE: Status: RESOLVED | Noted: 2022-01-01 | Resolved: 2022-01-01

## 2022-02-09 NOTE — REVIEW OF SYSTEMS
[Feeling Fatigued] : feeling fatigued [Negative] : Neurological [Fever] : no fever [Headache] : no headache [Chills] : no chills [Rash] : no rash [Anxiety] : no anxiety [Easy Bruising] : no tendency for easy bruising

## 2022-02-09 NOTE — ASSESSMENT
[FreeTextEntry1] : 76-yo male with h/o remote MI, CABG. ? LVEF.\par HTN.\par Hyperlipidemia.\par Evidence of LE PAD with claudication.\par CKD 4.\par \par Recommend:\par Continue treatment.\par Will try to obtain prior records ([facility], CABG report, ECHO).\par 2D ECHO.\par PVR/arterial duplex of the LE.\par F/u in 1 month.\par \par Dez Grier MD\par

## 2022-02-09 NOTE — ADDENDUM
[FreeTextEntry1] : ECHO 01/28/22:\par Anterior, septal, apical, inferior WMAs, LVEF 22%\par Severe LAE\par Mild MR, TR\par Probably bicuspid AV, mild AS and AI.\par \par MPI 01/31/22:\par Scar in LAD and RCA territories, no ischemia\par LVEF 25%.\par \par Impression:\par Intermediate risk for cardiovascular complications of AV fistula creation.\par \par Recommend:\par Proceed with surgery.\par Continue ASA and Metoprolol.\par No SBE prophylaxis.\par

## 2022-02-09 NOTE — HISTORY OF PRESENT ILLNESS
[FreeTextEntry1] : 76-yo male with h/o CAD, MI and 2-vessel CABG in 2000. Patient was followed by a cardiologist in [place] for several years. He is not sure about the extent of damage to his heart or LV function.\par \par Progressive kidney disease in the last few years. Patient is scheduled for AV fistula creation next week. He was referred for kidney transplant evaluation but rejected due to his cardiac condition.\par \par He denies CP, SOB. His walking is limited by fatigue, muscle weakness.\par \par GFR 11\par Hb 8.1.

## 2022-03-01 PROBLEM — I25.9 CHRONIC ISCHEMIC HEART DISEASE: Status: ACTIVE | Noted: 2022-01-01

## 2022-03-01 NOTE — ASSESSMENT
[FreeTextEntry1] : 76-yo male with h/o remote MI, CABG. \par Ischemic cardiomyopathy, severe LV dysfunction. No ischemia.\par HTN.\par Hyperlipidemia.\par CKD 4. S/p AV fistula creation.\par \par Recommend:\par Continue treatment.\par Will try to obtain prior records ([facility], CABG report).\par ICD discussed patient. He has repeatedly refused it in the past. I explained the indication and the risk. He will discuss it with his family and let me know next time.\par Will start Entresto once HD is started.\par F/u in 1 month.\par \par Dez Grier MD\par

## 2022-03-01 NOTE — CARDIOLOGY SUMMARY
[de-identified] : 03/01/22:\par SR, IVCD, prob old AWMI and IWMI, diffuse ST-T changes. [de-identified] : 01/28/22:\par AW, septal, IW scar, LVEF 22%\par G2DD, severe LAE\par Mild ELLY\par Mild MR, TR\par Mild AS (1.6), AI.\par  [de-identified] : 01/31/22:\par Anterior, inferior, apical scar. No ischemia.\par LVEF 25%. [de-identified] : PVR of the LE 01/23/22:\par No significant PAD.

## 2022-03-01 NOTE — HISTORY OF PRESENT ILLNESS
[FreeTextEntry1] : 76-yo male with h/o CAD, MI and 2-vessel CABG in 2000. \par \par Progressive kidney disease in the last few years. Patient is scheduled for AV fistula creation next week. He was referred for kidney transplant evaluation but rejected due to his cardiac condition.\par \par He denies CP, SOB. His walking is limited by fatigue, muscle weakness.\par \par GFR 11\par Hb 8.1.

## 2022-03-19 NOTE — H&P ADULT - ATTENDING COMMENTS
77 YO M with a PMH of CKD5, CAD, HFrEF, HLD, HTN, and recent shingles infection who presents to the hospital with a c/o progressive SOB for the past x 1-2 days. Associated with B/L LE swelling. - orthopnea, - non-productive cough. Denies any fevers/chills, CP, palpitations, N/V/D, ABD pain, dysuria, headaches, or rashes.     In the ED, Chest X-ray shows small right pleural effusion. RUQ US shows GB wal edema and perihepatic hepatic ascites and right pleural effusion. BNP elevated. Started on IV Lasix.     FMHx: Reviewed, not relevant    Physical exam shows pt in NAD. VSS, afebrile, not hypoxic on RA. A&Ox3. Non-focal neuro exam. Muscle strength/sensation intact. Crackles noted in B/L lung fields. RRR, no M/G/R. ABD is soft and non-tender, normoactive BSs. B/L LEs with 1+ pitting edema. No rashes. Labs and radiology as above.    Dyspnea, likely due to acute on chronic HFrEF + fluid overload. IV Lasix and transition to PO. Echo. Monitor daily weights, Is&Os, and diet/fluid restriction. BMP in the AM. Restart home meds.     NAGMA (Non-AG Metabolic Acidosis) from Renal. Obtain VBG. Repeat BMP in the AM. Renal consult.     Hyponatremia. Send urine/serum osmo and urine lytes. TSH. Fluid restriction. Serial BMPs. Do not over correct (<8-10 in 24 hours).     Transaminitis, likely from hepatic congestion. Diuresis. Trend LFTs.     Troponin elevation, suspected cause is CKD5. Doubt ACS. No CP or EKG changes. Serial cardiac enzymes and EKGs.     HX of CKD5, CAD, HLD, HTN, and recent shingles infection. Restart home meds, except as stated above. DVT PPX. Inform PCP of pt's admission to hospital. My note supersedes the residents note.     Date seen by Attending: 3/19/22

## 2022-03-19 NOTE — H&P ADULT - NSHPLABSRESULTS_GEN_ALL_CORE
12.1   5.30  )-----------( 241      ( 19 Mar 2022 10:22 )             36.3       03-19    129<L>  |  96<L>  |  70<HH>  ----------------------------<  190<H>  4.4   |  15<L>  |  5.3<HH>    Ca    8.5      19 Mar 2022 10:22  Mg     1.8     03-19    TPro  6.4  /  Alb  3.7  /  TBili  0.6  /  DBili  x   /  AST  62<H>  /  ALT  67<H>  /  AlkPhos  156<H>  03-19        Lactate Trend    CARDIAC MARKERS ( 19 Mar 2022 10:22 )  x     / 0.07 ng/mL / x     / x     / x        CAPILLARY BLOOD GLUCOSE

## 2022-03-19 NOTE — H&P ADULT - NSHPPHYSICALEXAM_GEN_ALL_CORE
Vital Signs Last 24 Hrs  T(C): --  T(F): --  HR: 56 (19 Mar 2022 12:18) (56 - 58)  BP: 140/78 (19 Mar 2022 12:18) (136/74 - 140/78)  BP(mean): --  RR: 18 (19 Mar 2022 09:07) (18 - 18)  SpO2: 97% (19 Mar 2022 09:07) (97% - 97%)      GENERAL: NAD, lying in bed comfortably  HEAD:  Atraumatic, Normocephalic  EYES: EOMI, conjunctiva and sclera clear  ENT: Moist mucous membranes  NECK: Supple, No JVD  CHEST/LUNG: Clear to auscultation bilaterally;  Unlabored respirations  HEART: Regular rate and rhythm; No murmurs, rubs, or gallops  ABDOMEN: Bowel sounds present; Soft, Nontender, Nondistended.   EXTREMITIES: 2+ peripheral LE edema, No clubbing, cyanosis  NERVOUS SYSTEM:  Alert & Oriented X3, speech clear. No deficits   SKIN: crusted over lesions on right chest

## 2022-03-19 NOTE — PATIENT PROFILE ADULT - FALL HARM RISK - HARM RISK INTERVENTIONS

## 2022-03-19 NOTE — ED PROVIDER NOTE - PHYSICAL EXAMINATION
VITAL SIGNS: I have reviewed nursing notes and confirm.  CONSTITUTIONAL: non-toxic, well appearing  SKIN: no rash, no petechiae.  EYES: EOMI, pink conjunctiva, anicteric  ENT: tongue midline, no exudates, MMM  NECK: Supple; no meningismus, no JVD  CARD: RRR, no murmurs, equal radial pulses bilaterally 2+  RESP: CTAB, no respiratory distress  ABD: Soft, non-tender, non-distended, no peritoneal signs, no HSM, no CVA tenderness  EXT: Normal ROM x4. +2 lower extremity edema   NEURO: Alert, oriented x3. CN2-12 intact, equal strength bilaterally, nl gait.  PSYCH: Cooperative, appropriate.

## 2022-03-19 NOTE — H&P ADULT - HISTORY OF PRESENT ILLNESS
76 yr old m w/ a pmh significant for CKD5, CAD, CHF, HLD, HTN who presents with sob and lower extremity swelling. Pt states that he has been having lower extremity pain and sob for the past couple of days. Pt was told by nephrologist to come to the hospital for admission. Of note, pt did have placement of AV fistula for future dialysis but is not mature next. Patient also was complaining of right sided chest pain and right sided back pain from an episode of shingles. The lesions are crusted over, but the pain still remains. Patient denies fever, chills, cough, abdominal pain, vomiting, constipation and diarrhea.     In ED vitals:    76 yr old m w/ a pmh significant for CKD5, CAD, CHF, HLD, HTN who presents with sob and lower extremity swelling. Pt states that he has been having lower extremity pain and sob for the past couple of days. Pt was told by nephrologist to come to the hospital for admission. Of note, pt did have placement of AV fistula for future dialysis but is not mature next. Patient also was complaining of right sided chest pain and right sided back pain from an episode of shingles. The lesions are crusted over, but the pain still remains. Patient denies fever, chills, cough, abdominal pain, vomiting, constipation and diarrhea.     In ED vitals: /74, HR 58, RR 18, SpO2 97% on RA Patient got one dose of IV lasix in ED.

## 2022-03-19 NOTE — H&P ADULT - ASSESSMENT
76 yr old m w/ a pmh significant for CKD5, CAD, HFrEF, HLD, HTN who presents with sob and lower extremity swelling. Pt states that he has been having lower extremity swelling and sob for the past couple of days.    #Dyspnea and LE swelling 2/2 volume overload from HFrEF and CKD5/ESRD   - BNP 46,457, CXR looks congested -> f/u official read  - Echo 3/19/21: EF 20-25%, G1DD, Mild MR, Mild AR, Mild IN  - s/p 40mg IV lasix in ED -> cont 80mg IV bid   - monitor Is and Os, daily weights   - cont metoprolol succinate 100 qd     #CKD5/ESRD   #Elevated Troponin 2/2 CKD   - Right upper extremity immature AVF   - Trop 0.07 -> cont to trend  - cont sevelamer, cinacalcet, calcitriol, sodium bicarb as per nephro  - Nephro consult - Dr. Spencer   - avoid nephrotoxic agents     #Prolonged QTc   - EKG: QTc 490   - avoid QTc prolonging agents     #Hyponatremia 2/2 volume overload   - Na 129 on admission   - cont with diuresis   - avoid overcorrection     #Post Herpetic Neuralgia   - Shingles treated 2 weeks ago with acyclovir   - start carbamazepine     #DM   - F/u a1c   - cont home insulin dosing: Lantus 10 qhs, lispro 3 tid     #HTN - controlled off meds   #HLD - cont statin   #CAD - cont ASA, statin and metoprolol     #Misc   Diet DASH  DVT ppx heparin   GI ppx none  Activity IAT

## 2022-03-19 NOTE — ED PROVIDER NOTE - OBJECTIVE STATEMENT
76 yr old m w/ a pmh significant for CKD, CAD, CHF, HLD, HTN who presents with sob and lower extremity swelling. Pt states that he has been having lower extremity pain and sob for the past couple of days. Pt was told by nephrologist to come to the hospital for admission. Of note, pt did have placement of AV fistula for future dialysis. Pt denies any other medical complaints.

## 2022-03-19 NOTE — ED ADULT NURSE NOTE - OBJECTIVE STATEMENT
Patient c/o right sided chest pain with B/L LE edema and ascites x 1 week. Patient has PMH of CHF, CKD and recently recovered from shingles. On assessment B/L LE +2 edema present. Abdominal distention present, denies pain, soft nontender. Denies nausea, vomiting, fever, chills. + SOB, patient sating 98 % on room air. Patient also c/o difficulty urinating x 1 week. CM in place.

## 2022-03-19 NOTE — ED PROVIDER NOTE - NS_BEDUNITTYPES_ED_ALL_ED
[facility] Medicine  Progress note      Team: [facility] HOSP MED B Terry Deleon MD  Admit Date: 1/4/2019  JAMISON 1/15/2019  Code status: Full Code    Principal Problem:  Gross hematuria    Tejeda Catheter removed for voiding trial. Daughter  wants SNF placement. kayexalate 30gm x once for hyperkalemia 5.5       ROS   Constitutional: Negative for fever.   HENT: Negative for sore throat.  Blindness  Respiratory: Negative for shortness of breath.    Cardiovascular: Negative for chest pain.   Gastrointestinal: Negative for nausea.   Genitourinary: Negative for dysuria.        Hematuria   Musculoskeletal: Negative for back pain.   Skin: Negative for rash.   Neurological: Negative for weakness.   Hematological: Does not bruise/bleed easily.      PEx  Temp:  [95.4 °F (35.2 °C)-97.6 °F (36.4 °C)]   Pulse:  [57-74]   Resp:  [16-20]   BP: (138-193)/(66-81)   SpO2:  [97 %-100 %]     Intake/Output Summary (Last 24 hours) at 1/14/2019 1305  Last data filed at 1/14/2019 0500  Gross per 24 hour   Intake --   Output 100 ml   Net -100 ml       Constitutional: Negative for fever. AAOX 2  HENT: Negative for sore throat.  right corneal opacity   Respiratory: Negative for shortness of breath.    Cardiovascular: Negative for chest pain.   Gastrointestinal: Negative for nausea.   Genitourinary: Negative for dysuria.        Penile bleeding as has been in the past. tejeda with bloody urine  Musculoskeletal: Negative for back pain.   Skin: Negative for rash.   Neurological: Negative for weakness.   Hematological: Does not bruise/bleed easily.    Recent Labs   Lab 01/12/19  0341 01/13/19  0507 01/14/19  0420   WBC 8.75 7.10 8.51   HGB 8.7* 8.6* 10.3*   HCT 26.5* 26.4* 32.5*    164 167     Recent Labs   Lab 01/08/19  0725  01/10/19  1350  01/12/19  0341 01/13/19  0507 01/14/19  0420   *   < > 134*   < > 135* 131* 129*   K 4.8   < > 4.6   < > 4.3 4.7 5.5*      < > 99   < > 101 99 98   CO2 21*   < > 24   < > 25 20* 18*   BUN 57*   < >  45*   < > 25* 35* 45*   CREATININE 7.9*   < > 7.1*   < > 5.3* 6.5* 7.9*   *   < > 188*   < > 114* 126* 125*   CALCIUM 8.6*   < > 8.2*   < > 8.2* 8.0* 8.1*   PHOS 5.1*  --  3.9  --   --   --   --     < > = values in this interval not displayed.     Recent Labs   Lab 01/12/19  0341 01/13/19  0507 01/14/19  0420   ALKPHOS 110 100 100   ALT <5* <5* <5*   AST 35 27 38   ALBUMIN 2.2* 2.2* 2.3*   PROT 7.4 7.0 7.8   BILITOT 0.4 0.4 0.3      Recent Labs   Lab 01/13/19  0729 01/13/19  1133 01/13/19  1624 01/13/19  2140 01/14/19  0808 01/14/19  1202   POCTGLUCOSE 136* 289* 131* 181* 94 175*     No results for input(s): CPK, CPKMB, MB, TROPONINI in the last 72 hours.    Scheduled Meds:   sodium chloride 0.9%   Intravenous Once    sodium chloride 0.9%   Intravenous Once    amLODIPine  5 mg Oral Daily    aspirin  81 mg Oral Daily    brinzolamide  1 drop Left Eye TID    calcium acetate  667 mg Oral TID WM    carvedilol  25 mg Oral BID    dutasteride  0.5 mg Oral Daily    epoetin napoleon (PROCRIT) injection  8,000 Units Intravenous Every Tues, Thurs, Sat    levETIRAcetam  500 mg Oral BID    polyethylene glycol  17 g Oral Daily    travoprost  1 drop Left Eye QHS     Continuous Infusions:  As Needed:  sodium chloride, sodium chloride, sodium chloride, sodium chloride 0.9%, dextrose 50%, dextrose 50%, glucagon (human recombinant), glucose, glucose, insulin aspart U-100, sodium chloride 0.9%, sodium chloride 0.9%, traMADol    Active Hospital Problems    Diagnosis  POA    Pre-op evaluation [Z01.818]  Not Applicable    Anemia [D64.9]  Yes    Hematuria [R31.9]  Yes    HTN (hypertension) [I10]  Yes    Dyslipidemia [E78.5]  Yes    ESRD (end stage renal disease) on dialysis [N18.6, Z99.2]  Not Applicable    Blindness of right eye [H54.40]  Yes    Anemia in chronic kidney disease, on chronic dialysis [N18.6, D63.1, Z99.2]  Not Applicable    Chronic combined systolic and diastolic heart failure [I50.42]  Yes      Chronic    Seizure disorder [G40.909]  Yes     Chronic      Resolved Hospital Problems    Diagnosis Date Resolved POA    *Gross hematuria [R31.0] 01/10/2019 Yes    Altered mental status [R41.82] 01/10/2019 No       Overview  89-year-old male presents to the ER for evaluation of bleeding from the penis.  The patient has had this problem intermittently for the past 2 months and has been seen here and admitted here for multiple times.  Urology believes that the bleeding is coming from the prostate. Urology saw patient and ideally recommends irrigate tejeda PRN and transfuse PRN. Will plan to add 1% alum irrigation for hematuria to start tomorrow. May need endoscopic intervention in near future but currently feel risks of invasive intervention would be overly morbid. Hemodialysis done.        Assessment and Plan for Problems addressed today:         Hematuria     - urology consulted  - Urine culture - NGTD   - cystoscopy last admission unrevealing  - Low H/H, type and screen with transfusion of 1U pRBCs  - Hgb has been stable for a couple days.s/p HD today (1/8).  2d echo- Moderately decreased left ventricular systolic function - worse in the anterolateral wall. The estimated ejection fraction is 35%. Grade I (mild) left ventricular diastolic dysfunction consistent with impaired relaxation. CBI appeared to clot off however it cleared easily with no clots irrigated out which suggests what little urine he makes daily is not sufficient to clear residual bladder/prostatic bleeding.,alum unavailable at this institution. If patient continues to bleed today Urology to pursue cystoscopy with possible intervention.(1/8).   - CT 11/21/2018:   1. Irregular contour about the superolateral aspect of the right kidney, mass cannot be excluded.  There is a tiny non-obstructing calcification within that region.  Right renal cyst.  4.  Several enlarged para-aortic lymph nodes extending along the left ileopsoas with some obliteration of  the fat planes with an additional enlarged left external iliac lymph node.  These findings are new since CT pelvis 11/14/2017.    S/P TURP and fulguration of prostate on 1/8/18. Suspected prostate as a source of bleeding, unlikely due to small renal mass. Appeared to clot off this morning,CBI restarted  this AM on slow drip with clamp again in am.       Urology OK sending patient  home with a 60cc catheter tipped syringe and irrigation supplies, if family member or caretaker is amenable to learning how to irrigate his catheter in the event it should clog, it would likely circumvent an emergency department visit.Daughter not willing to irrigate tejeda per urology instructions.  Tejeda Catheter removed for voiding trial.      Anemia in chronic kidney disease, on chronic dialysis     - Hgb 6.5 -> 8.8--> 8.7 with hematuria. PRBC 1 unit transfusion today   - Transfused 2 U pRBCs (1/4)     Hyperkalemia -  kayexalate 30gm x once for hyperkalemia 5.5       Blindness     - home gtts      Dementia with behavioral disturbance     - AAOX2 -oriented to month and year   - zyprexa PRN      Renovascular hypertension      - continue home medications. 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 Glucose controlled . HbA1c at 6.3      ESRD (end stage renal disease) on dialysis     - HD MWF  - nephrology consulted  - BP and lytes non-emergent   refused HD today. Follow up CMP       Benign prostatic hyperplasia with urinary retention     - continue Dutasteride       Chronic combined systolic and diastolic heart failure     - last seen by cardiology 05/16 -Likely Nonischemic CMY- asymptomatic with mild LE edema- Echo 6/15 showed improved EF to 40%. Anterior and anterolateral hypokinesis. Previously discussed stress test to evaluate for regional ischemia (stress test in 2011 negative) but risk vs benefit (angiogram, etc) and with patients/families  decided not to  pursue any further testing.     compensated, continue home medications.  2d echo- Moderately decreased left ventricular systolic function - worse in the anterolateral wall. The estimated ejection fraction is 35%. Grade I (mild) left ventricular diastolic dysfunction consistent with impaired relaxation      Seizure disorder     - continue home keppra       Disposition - home with PACE program . Daughter  wants SNF placement.     DVT PPx:      Provider  Terry Deleon MD  Attending Staff Physician  [facility] Medicine  pager- [phone] [place] - [phone]   TELEMETRY

## 2022-03-19 NOTE — ED PROVIDER NOTE - CARE PLAN
1 Principal Discharge DX:	SOB (shortness of breath)  Secondary Diagnosis:	Lower extremity edema  Secondary Diagnosis:	Acute CHF

## 2022-03-19 NOTE — ED ADULT TRIAGE NOTE - CHIEF COMPLAINT QUOTE
Patient complaining of chest pain, shortness of breath and swelling of b/l legs x2 days. Patient has kidney and heart failure and was told to come in by his nephrologist.

## 2022-03-19 NOTE — ED PROVIDER NOTE - NS ED ROS FT
Review of Systems    Constitutional: (-) fever  Cardiovascular: (-) chest pain, (-) syncope  Respiratory: (-) cough, (+) shortness of breath  Gastrointestinal: (-) vomiting, (-) diarrhea, (-) abdominal pain  Musculoskeletal: (-) neck pain, (-) back pain, (-) joint pain  Integumentary: (-) rash, (+) edema  Neurological: (-) headache, (-) altered mental status    Except as documented in the HPI, all other systems are negative.

## 2022-03-19 NOTE — ED ADULT NURSE NOTE - NSICDXPASTMEDICALHX_GEN_ALL_CORE_FT
PAST MEDICAL HISTORY:  CAD (coronary artery disease)     CHF (congestive heart failure)     CKD (chronic kidney disease)     DM (diabetes mellitus)     HLD (hyperlipidemia)     HTN (hypertension)

## 2022-03-20 NOTE — PROGRESS NOTE ADULT - SUBJECTIVE AND OBJECTIVE BOX
[facility]  INITIAL CONSULT NOTE  --------------------------------------------------------------------------------  HPI:    76 yr old m w/ a pmh significant for  CAD, CHF, HLD, HTN who presents with sob and lower extremity swelling. Pt states that he has been having lower extremity pain and sob for the past couple of days. CKD4-5, s/p  L AV fistula which is not ready to use.     PAST HISTORY  --------------------------------------------------------------------------------  PAST MEDICAL & SURGICAL HISTORY:  DM (diabetes mellitus)    CAD (coronary artery disease)    CKD (chronic kidney disease)    HTN (hypertension)    HLD (hyperlipidemia)    CHF (congestive heart failure)      FAMILY HISTORY:    PAST SOCIAL HISTORY:    ALLERGIES & MEDICATIONS  --------------------------------------------------------------------------------  Allergies    No Known Allergies    Intolerances      Standing Inpatient Medications  aspirin  chewable 81 milliGRAM(s) Oral daily  atorvastatin 10 milliGRAM(s) Oral at bedtime  calcitriol   Capsule 1 MICROGram(s) Oral daily  cinacalcet 30 milliGRAM(s) Oral daily  furosemide   Injectable 80 milliGRAM(s) IV Push two times a day  heparin   Injectable 5000 Unit(s) SubCutaneous every 8 hours  influenza  Vaccine (HIGH DOSE) 0.7 milliLiter(s) IntraMuscular once  insulin glargine Injectable (LANTUS) 10 Unit(s) SubCutaneous at bedtime  insulin lispro (ADMELOG) corrective regimen sliding scale   SubCutaneous three times a day before meals  insulin lispro Injectable (ADMELOG) 3 Unit(s) SubCutaneous three times a day before meals  metoprolol succinate  milliGRAM(s) Oral daily  sevelamer carbonate 800 milliGRAM(s) Oral three times a day with meals  sodium bicarbonate 1300 milliGRAM(s) Oral three times a day    PRN Inpatient Medications  acetaminophen     Tablet .. 650 milliGRAM(s) Oral every 6 hours PRN  melatonin 5 milliGRAM(s) Oral at bedtime PRN  ondansetron Injectable 4 milliGRAM(s) IV Push every 8 hours PRN      REVIEW OF SYSTEMS  --------------------------------------------------------------------------------    Skin: No rashes  Respiratory: SOB  CV: No chest pain,  GI: No abdominal pain, diarrhea, constipation, nausea, vomiting, melena, hematochezia  MSK: leg edema,  No joint pain/swelling; no back pain;           All other systems were reviewed and are negative, except as noted.    VITALS/PHYSICAL EXAM  --------------------------------------------------------------------------------  T(C): 35.7 (03-20-22 @ 04:33), Max: 36.3 (03-19-22 @ 19:54)  HR: 56 (03-20-22 @ 04:33) (56 - 60)  BP: 141/70 (03-20-22 @ 04:33) (126/74 - 141/70)  RR: 18 (03-20-22 @ 04:33) (18 - 20)  SpO2: 99% (03-19-22 @ 19:54) (97% - 99%)  Wt(kg): --  Height (cm): 170.2 (03-19-22 @ 19:54)  Weight (kg): 66.2 (03-19-22 @ 19:54)  BMI (kg/m2): 22.9 (03-19-22 @ 19:54)  BSA (m2): 1.77 (03-19-22 @ 19:54)      03-20-22 @ 07:01  -  03-20-22 @ 12:40  --------------------------------------------------------  IN: 0 mL / OUT: 400 mL / NET: -400 mL      Physical Exam:  	Gen: no distress  	HEENT: PERRL, supple neck,  	Pulm: B/L BS, rales  	CV: S1S2; no rub  	Abd: +BS, soft, nontender/nondistended  	LE:  edema  	Neuro: No focal deficits,   	    LABS/STUDIES  --------------------------------------------------------------------------------              11.6   4.64  >-----------<  215      [03-20-22 @ 04:30]              33.6     130  |  95  |  81  ----------------------------<  105      [03-20-22 @ 04:30]  4.1   |  16  |  5.4        Ca     8.0     [03-20-22 @ 04:30]      Mg     1.8     [03-20-22 @ 04:30]      Phos  6.0     [03-20-22 @ 04:30]    TPro  6.3  /  Alb  3.5  /  TBili  0.4  /  DBili  x   /  AST  68  /  ALT  93  /  AlkPhos  186  [03-20-22 @ 04:30]        Troponin 0.07      [03-20-22 @ 04:30]    Creatinine Trend:  SCr 5.4 [03-20 @ 04:30]  SCr 5.3 [03-19 @ 10:22]        Iron 22, TIBC 187, %sat 12      [03-22-21 @ 07:52]  Ferritin 329      [03-22-21 @ 07:52]  PTH -- (Ca 7.2)      [03-21-21 @ 07:33]   320  Lipid: chol 91, TG 58, HDL 39, LDL --      [03-20-22 @ 04:30]

## 2022-03-20 NOTE — PROGRESS NOTE ADULT - ASSESSMENT
# CKD V, likely due to DM/HTN,   s/p AVF creation , not ready  to use  fluid overload  continue IV lasix 80 mg bid and fluid restriction  no urgent need to initiate HD today  binder for high phos  Sensipar for high PTH  may need HD via NIKKI cath if he is not getting better  f/u BMP, phos    d/w house staff

## 2022-03-20 NOTE — CONSULT NOTE ADULT - ASSESSMENT
Impression:   #Chronic Ischemic Heart Disease  #Acute exacerbation of CHFrEF with EF <25%   #Ischemic cardiomyopathy  #CKD 5 s/p AV fistula creation  #Troponinemia secondary to demand ischemia from CHF exacerbation and Advanced Kidney Disease  #Right Sided Reproducible Chest Pain     Plan:   - Continue on Lasix 80mg IV BID as per nephrology; SOB is improving  - Troponin stable 0.06/0.07. EKG with no acute ST or T wave abnormalities   - TTE noted; EF <20% which is worse than prior TTE. Patient noted to have refused AICD multiple times and is aware of the risk for SCD secondary to arrythmia.   - Continue on Metoprolol 100mg Daily; HR at goal   - Continue on ASA 81  - Chest pain appears to be post herpetic neuralgia. It's reproducible and burning in nature. Visible scars from recent shingles present at site of pain   - As per Dr. Grier outpatient note on 3/1/2022: plan to start on Entresto once patient is on HD. Follow up appointment set for 6/21/2022   Impression:   #Chronic Ischemic Heart Disease  #Acute exacerbation of CHFrEF with EF <25%   #Ischemic cardiomyopathy  #CKD 5 s/p AV fistula creation  #Troponinemia secondary to demand ischemia from CHF exacerbation and Advanced Kidney Disease  #Right Sided Reproducible Chest Pain     Plan:   - Continue on Lasix 80mg IV BID as per nephrology; SOB is improving  - TTE: EF <20% which is worse than prior TTE. Patient noted to have refused AICD multiple times and is aware of the risk for SCD secondary to arrythmia.   - Continue on Metoprolol 100mg Daily; HR at goal   - Continue on ASA 81  - Troponin mildly elevated but stable 0.07  - Nuclear stress test Jan 2022 showed  large in size severe in severity inferior, inferoseptal defect defect without reversibility.   - Continue ASA, Metoprolol and Lipitor.  - Chest pain appears to be post herpetic neuralgia. It's reproducible and burning in nature. Visible scars from recent shingles present at site of pain   - As per Dr. Grier outpatient note on 3/1/2022: plan to start on Entresto once patient is on HD. Follow up appointment set for 6/21/2022     Impression:   #Chronic Ischemic Heart Disease  #Acute exacerbation of CHFrEF with EF <25%    #Ischemic cardiomyopathy  #CKD 5 s/p AV fistula creation  #Troponinemia secondary to demand ischemia from CHF exacerbation and Advanced Kidney Disease  #Right Sided Reproducible Chest Pain     Plan:   - Continue on Lasix 80mg IV BID as per nephrology; SOB is improving  - TTE: EF <20% which is worse than prior TTE. Patient noted to have refused AICD multiple times and is aware of the risk for SCD secondary to arrythmia.   - Continue on Metoprolol 100mg Daily; HR at goal   - Continue on ASA 81  - Troponin mildly elevated but stable 0.07  - Nuclear stress test Jan 2022 showed  large in size severe in severity inferior, inferoseptal defect defect without reversibility.   - Continue ASA, Metoprolol and Lipitor.  - Chest pain appears to be post herpetic neuralgia. It's reproducible and burning in nature. Visible scars from recent shingles present at site of pain   - As per Dr. Grier outpatient note on 3/1/2022: plan to start on Entresto once patient is on HD. Follow up appointment set for 6/21/2022

## 2022-03-20 NOTE — PROGRESS NOTE ADULT - ASSESSMENT
76 yr old m w/ a pmh significant for CKD5, CAD, HFrEF, HLD, HTN who presents with sob and lower extremity swelling. Pt states that he has been having lower extremity swelling and sob for the past couple of days.    A/P:   Acute HFrEF: Ischemic cardiomyopathy  Leg edema, SOB, vascular congestion.   CXR showed bilateral pleural effusion and mild interstitial infiltrates (new).   Echo March 21 showed LVEF 20-25%  Lasix 80mg IV BID.   Continue Metoprolol. Not on ACEi due to CKD.   Per patient he refused ICD in the past.      CAD s/p CABG in 2000  EKG showed new LBBB, TWI II, III, aVF,   Troponin mildly elevated but stable 0.07  Nuclear stress test Jan 2022 showed  large in size severe in severity inferior, inferoseptal defect defect without reversibility.   Continue ASA, Metoprolol and Lipitor.     CKD5/ESRD   s/p Right upper extremity AVF creation, not ready for use.   No need for urgent HD per nephrology.   continue sevelamer, cinacalcet, calcitriol, sodium bicarb.    Right sided chest pain: likely from recent Herpes zoster infection.  He was treated with Acyclovir.   Started on tegretol.     Prolonged QTc   EKG: QTc 490   - avoid QTc prolonging agents     DM   cont home insulin dosing: Lantus 10 qhs, lispro 3 tid     DVT ppx heparin

## 2022-03-20 NOTE — PROGRESS NOTE ADULT - SUBJECTIVE AND OBJECTIVE BOX
WILBERT JULIAN  76y  Male      Patient is a 76y old  Male who presents with a chief complaint of fluid overload (20 Mar 2022 15:45)      INTERVAL HPI/OVERNIGHT EVENTS:  He feels slightly better. Still with leg edema and SOB.   Vital Signs Last 24 Hrs  T(C): 36.4 (20 Mar 2022 12:51), Max: 36.4 (20 Mar 2022 12:51)  T(F): 97.5 (20 Mar 2022 12:51), Max: 97.5 (20 Mar 2022 12:51)  HR: 59 (20 Mar 2022 12:51) (56 - 60)  BP: 143/73 (20 Mar 2022 12:51) (132/76 - 143/73)  BP(mean): --  RR: 20 (20 Mar 2022 12:51) (18 - 20)  SpO2: 99% (19 Mar 2022 19:54) (99% - 99%)      03-20-22 @ 07:01  -  03-20-22 @ 16:05  --------------------------------------------------------  IN: 0 mL / OUT: 400 mL / NET: -400 mL            Consultant(s) Notes Reviewed:  [x ] YES  [ ] NO          MEDICATIONS  (STANDING):  aspirin  chewable 81 milliGRAM(s) Oral daily  atorvastatin 10 milliGRAM(s) Oral at bedtime  calamine/zinc oxide Lotion 1 Application(s) Topical once  calcitriol   Capsule 1 MICROGram(s) Oral daily  cinacalcet 30 milliGRAM(s) Oral daily  furosemide   Injectable 80 milliGRAM(s) IV Push two times a day  heparin   Injectable 5000 Unit(s) SubCutaneous every 8 hours  influenza  Vaccine (HIGH DOSE) 0.7 milliLiter(s) IntraMuscular once  insulin glargine Injectable (LANTUS) 10 Unit(s) SubCutaneous at bedtime  insulin lispro (ADMELOG) corrective regimen sliding scale   SubCutaneous three times a day before meals  insulin lispro Injectable (ADMELOG) 3 Unit(s) SubCutaneous three times a day before meals  metoprolol succinate  milliGRAM(s) Oral daily  sevelamer carbonate 800 milliGRAM(s) Oral three times a day with meals  sodium bicarbonate 1300 milliGRAM(s) Oral three times a day    MEDICATIONS  (PRN):  acetaminophen     Tablet .. 650 milliGRAM(s) Oral every 6 hours PRN Temp greater or equal to 38C (100.4F), Mild Pain (1 - 3)  melatonin 5 milliGRAM(s) Oral at bedtime PRN Insomnia  ondansetron Injectable 4 milliGRAM(s) IV Push every 8 hours PRN Nausea and/or Vomiting      LABS                          11.6   4.64  )-----------( 215      ( 20 Mar 2022 04:30 )             33.6     03-20    130<L>  |  95<L>  |  81<HH>  ----------------------------<  105<H>  4.1   |  16<L>  |  5.4<HH>    Ca    8.0<L>      20 Mar 2022 04:30  Phos  6.0     03-20  Mg     1.8     03-20    TPro  6.3  /  Alb  3.5  /  TBili  0.4  /  DBili  x   /  AST  68<H>  /  ALT  93<H>  /  AlkPhos  186<H>  03-20          Lactate Trend    CARDIAC MARKERS ( 20 Mar 2022 04:30 )  x     / 0.07 ng/mL / x     / x     / x      CARDIAC MARKERS ( 19 Mar 2022 16:00 )  x     / 0.06 ng/mL / x     / x     / x      CARDIAC MARKERS ( 19 Mar 2022 10:22 )  x     / 0.07 ng/mL / x     / x     / x          CAPILLARY BLOOD GLUCOSE      POCT Blood Glucose.: 179 mg/dL (20 Mar 2022 12:49)        RADIOLOGY & ADDITIONAL TESTS:    Imaging Personally Reviewed:  [ ] YES  [ ] NO    HEALTH ISSUES - PROBLEM Dx:          PHYSICAL EXAM:  GENERAL: NAD, well-developed.  HEAD:  Atraumatic, Normocephalic.  EYES: EOMI, PERRLA, conjunctiva and sclera clear.  NECK: Supple, No JVD.  CHEST/LUNG: decreased breath sounds on bases, bilateral lower lung rales.   HEART: Regular rate and rhythm; S1 S2.   ABDOMEN: Soft, Nontender, Nondistended; Bowel sounds present.  EXTREMITIES:  leg edema 2+  PSYCH: AAOx3.  NEUROLOGY: non-focal.  SKIN: No rashes or lesions.

## 2022-03-20 NOTE — CONSULT NOTE ADULT - ATTENDING COMMENTS
Impression:   #Chronic Ischemic Heart Disease  #Acute exacerbation of CHFrEF with EF <25%    #Ischemic cardiomyopathy  #CKD 5 s/p AV fistula creation  #Troponinemia secondary to demand ischemia from CHF exacerbation and Advanced Kidney Disease  #Right Sided Reproducible Chest Pain     Plan:   - Continue on Lasix 80mg IV BID as per nephrology; SOB is improving  - TTE: EF <20% which is worse than prior TTE. Patient noted to have refused AICD multiple times and is aware of the risk for SCD secondary to arrythmia.   - Continue on Metoprolol 100mg Daily; HR at goal   - Continue on ASA 81  - Troponin mildly elevated but stable 0.07  - Nuclear stress test Jan 2022 showed  large in size severe in severity inferior, inferoseptal defect defect without reversibility.   - Continue ASA, Metoprolol and Lipitor.  - Chest pain appears to be post herpetic neuralgia. It's reproducible and burning in nature. Visible scars from recent shingles present at site of pain   - As per Dr. Grier outpatient note on 3/1/2022: plan to start on Entresto once patient is on HD. Follow up appointment set for 6/21/2022  - Discussed with the patient in the presence of his wife and daughter benefits and risks of AICD. The patient did not want to proceed at this point and would like to reassess after he starts HD.

## 2022-03-21 NOTE — PROGRESS NOTE ADULT - SUBJECTIVE AND OBJECTIVE BOX
WILBERT JULIAN  76y  Male      Patient is a 76y old  Male who presents with a chief complaint of fluid overload (21 Mar 2022 13:13)      INTERVAL HPI/OVERNIGHT EVENTS:  Still with SOB and leg edema.   Vital Signs Last 24 Hrs  T(C): 36.2 (21 Mar 2022 13:42), Max: 36.2 (20 Mar 2022 20:18)  T(F): 97.1 (21 Mar 2022 13:42), Max: 97.2 (20 Mar 2022 20:18)  HR: 63 (21 Mar 2022 13:42) (60 - 64)  BP: 133/68 (21 Mar 2022 13:42) (131/69 - 145/73)  BP(mean): 93 (20 Mar 2022 20:18) (93 - 93)  RR: 20 (21 Mar 2022 13:42) (18 - 20)  SpO2: 96% (21 Mar 2022 06:52) (96% - 96%)      03-20-22 @ 07:01  -  03-21-22 @ 07:00  --------------------------------------------------------  IN: 480 mL / OUT: 3175 mL / NET: -2695 mL    03-21-22 @ 07:01  -  03-21-22 @ 13:52  --------------------------------------------------------  IN: 350 mL / OUT: 750 mL / NET: -400 mL            Consultant(s) Notes Reviewed:  [x ] YES  [ ] NO          MEDICATIONS  (STANDING):  aspirin  chewable 81 milliGRAM(s) Oral daily  atorvastatin 10 milliGRAM(s) Oral at bedtime  calcitriol   Capsule 1 MICROGram(s) Oral daily  carBAMazepine ER Capsule 200 milliGRAM(s) Oral daily  cinacalcet 30 milliGRAM(s) Oral daily  furosemide   Injectable 80 milliGRAM(s) IV Push two times a day  heparin   Injectable 5000 Unit(s) SubCutaneous every 8 hours  influenza  Vaccine (HIGH DOSE) 0.7 milliLiter(s) IntraMuscular once  insulin glargine Injectable (LANTUS) 10 Unit(s) SubCutaneous at bedtime  insulin lispro (ADMELOG) corrective regimen sliding scale   SubCutaneous three times a day before meals  insulin lispro Injectable (ADMELOG) 3 Unit(s) SubCutaneous three times a day before meals  metoprolol succinate  milliGRAM(s) Oral daily  sevelamer carbonate 800 milliGRAM(s) Oral three times a day with meals  sodium bicarbonate 1300 milliGRAM(s) Oral three times a day    MEDICATIONS  (PRN):  acetaminophen     Tablet .. 650 milliGRAM(s) Oral every 6 hours PRN Temp greater or equal to 38C (100.4F), Mild Pain (1 - 3)  melatonin 5 milliGRAM(s) Oral at bedtime PRN Insomnia  ondansetron Injectable 4 milliGRAM(s) IV Push every 8 hours PRN Nausea and/or Vomiting      LABS                          11.6   4.64  )-----------( 215      ( 20 Mar 2022 04:30 )             33.6     03-20    130<L>  |  95<L>  |  81<HH>  ----------------------------<  105<H>  4.1   |  16<L>  |  5.4<HH>    Ca    8.0<L>      20 Mar 2022 04:30  Phos  6.0     03-20  Mg     1.8     03-20    TPro  6.3  /  Alb  3.5  /  TBili  0.4  /  DBili  x   /  AST  68<H>  /  ALT  93<H>  /  AlkPhos  186<H>  03-20          Lactate Trend    CARDIAC MARKERS ( 20 Mar 2022 04:30 )  x     / 0.07 ng/mL / x     / x     / x      CARDIAC MARKERS ( 19 Mar 2022 16:00 )  x     / 0.06 ng/mL / x     / x     / x          CAPILLARY BLOOD GLUCOSE      POCT Blood Glucose.: 257 mg/dL (21 Mar 2022 11:23)        RADIOLOGY & ADDITIONAL TESTS:    Imaging Personally Reviewed:  [ ] YES  [ ] NO    HEALTH ISSUES - PROBLEM Dx:          PHYSICAL EXAM:  GENERAL: NAD, well-developed.  HEAD:  Atraumatic, Normocephalic.  EYES: EOMI, PERRLA, conjunctiva and sclera clear.  NECK: Supple, No JVD.  CHEST/LUNG: decreased breath sounds on bases, bilateral lower lung rales.   HEART: Regular rate and rhythm; S1 S2.   ABDOMEN: Soft, Nontender, Nondistended; Bowel sounds present.  EXTREMITIES:  leg edema 2+  PSYCH: AAOx3.  NEUROLOGY: non-focal.  SKIN: No rashes or lesions.

## 2022-03-21 NOTE — PROGRESS NOTE ADULT - SUBJECTIVE AND OBJECTIVE BOX
WILBERT JULIAN 76y Male  MRN#: [id number]   Hospital Day: 2d    SUBJECTIVE  Patient is a 76y old Male  pmh significant for CKD5, CAD, CHF, HLD, HTN who presents with sob and lower extremity swelling.    Currently admitted to medicine with the primary diagnosis of CHF exacerbation      INTERVAL HPI AND OVERNIGHT EVENTS:  Patient was examined and seen at bedside. This morning he is resting comfortably in bed and reports no issues or overnight events.    REVIEW OF SYMPTOMS:  Admits to burning sensation on right sided thorax and upper back. Otherwise denies any HA, CP, palpitations, SOB, abdominal pain.     OBJECTIVE  PAST MEDICAL & SURGICAL HISTORY  DM (diabetes mellitus)    CAD (coronary artery disease)    CKD (chronic kidney disease)    HTN (hypertension)    HLD (hyperlipidemia)    CHF (congestive heart failure)      ALLERGIES:  No Known Allergies    MEDICATIONS:  STANDING MEDICATIONS  aspirin  chewable 81 milliGRAM(s) Oral daily  atorvastatin 10 milliGRAM(s) Oral at bedtime  calcitriol   Capsule 1 MICROGram(s) Oral daily  carBAMazepine ER Capsule 200 milliGRAM(s) Oral daily  cinacalcet 30 milliGRAM(s) Oral daily  furosemide   Injectable 80 milliGRAM(s) IV Push two times a day  heparin   Injectable 5000 Unit(s) SubCutaneous every 8 hours  influenza  Vaccine (HIGH DOSE) 0.7 milliLiter(s) IntraMuscular once  insulin glargine Injectable (LANTUS) 10 Unit(s) SubCutaneous at bedtime  insulin lispro (ADMELOG) corrective regimen sliding scale   SubCutaneous three times a day before meals  insulin lispro Injectable (ADMELOG) 3 Unit(s) SubCutaneous three times a day before meals  metoprolol succinate  milliGRAM(s) Oral daily  sevelamer carbonate 800 milliGRAM(s) Oral three times a day with meals  sodium bicarbonate 1300 milliGRAM(s) Oral three times a day    PRN MEDICATIONS  acetaminophen     Tablet .. 650 milliGRAM(s) Oral every 6 hours PRN  melatonin 5 milliGRAM(s) Oral at bedtime PRN  ondansetron Injectable 4 milliGRAM(s) IV Push every 8 hours PRN      VITAL SIGNS: Last 24 Hours  T(C): 35.9 (21 Mar 2022 04:22), Max: 36.2 (20 Mar 2022 20:18)  T(F): 96.7 (21 Mar 2022 04:22), Max: 97.2 (20 Mar 2022 20:18)  HR: 64 (21 Mar 2022 06:52) (60 - 64)  BP: 145/73 (21 Mar 2022 06:52) (131/69 - 145/73)  BP(mean): 93 (20 Mar 2022 20:18) (93 - 93)  RR: 18 (21 Mar 2022 06:52) (18 - 20)  SpO2: 96% (21 Mar 2022 06:52) (96% - 96%)    LABS:                        11.6   4.64  )-----------( 215      ( 20 Mar 2022 04:30 )             33.6     03-20    130<L>  |  95<L>  |  81<HH>  ----------------------------<  105<H>  4.1   |  16<L>  |  5.4<HH>    Ca    8.0<L>      20 Mar 2022 04:30  Phos  6.0     03-20  Mg     1.8     03-20    TPro  6.3  /  Alb  3.5  /  TBili  0.4  /  DBili  x   /  AST  68<H>  /  ALT  93<H>  /  AlkPhos  186<H>  03-20      CARDIAC MARKERS ( 20 Mar 2022 04:30 )  x     / 0.07 ng/mL / x     / x     / x      CARDIAC MARKERS ( 19 Mar 2022 16:00 )  x     / 0.06 ng/mL / x     / x     / x          RADIOLOGY:    PHYSICAL EXAM:  CONSTITUTIONAL: No acute distress,  AAOx3  HEAD: Atraumatic, normocephalic  EYES: conjunctiva and sclera clear  ENT: No JVD  PULMONARY: Clear to auscultation bilaterally; no wheezes  CARDIOVASCULAR: Regular rate and rhythm; no murmurs, rubs, or gallops  GASTROINTESTINAL: Soft, non-tender, non-distended; bowel sounds present  MUSCULOSKELETAL: no peripheral edema, no vesicular lesions noted on chest and back  NEUROLOGY: non-focal  SKIN: warm and dry         WILBERT JULIAN 76y Male  MRN#: [id number]   Hospital Day: 2d    SUBJECTIVE  Patient is a 76y old Male  pmh significant for CKD5, CAD, CHF, HLD, HTN who presents with sob and lower extremity swelling.    Currently admitted to medicine with the primary diagnosis of CHF exacerbation      INTERVAL HPI AND OVERNIGHT EVENTS:  Patient was examined and seen at bedside. This morning he is resting comfortably in bed. Episodes of NSVT noted on tele.     REVIEW OF SYMPTOMS:  Admits to burning sensation on right sided thorax and upper back. Otherwise denies any HA, CP, palpitations, SOB, abdominal pain.     OBJECTIVE  PAST MEDICAL & SURGICAL HISTORY  DM (diabetes mellitus)    CAD (coronary artery disease)    CKD (chronic kidney disease)    HTN (hypertension)    HLD (hyperlipidemia)    CHF (congestive heart failure)      ALLERGIES:  No Known Allergies    MEDICATIONS:  STANDING MEDICATIONS  aspirin  chewable 81 milliGRAM(s) Oral daily  atorvastatin 10 milliGRAM(s) Oral at bedtime  calcitriol   Capsule 1 MICROGram(s) Oral daily  carBAMazepine ER Capsule 200 milliGRAM(s) Oral daily  cinacalcet 30 milliGRAM(s) Oral daily  furosemide   Injectable 80 milliGRAM(s) IV Push two times a day  heparin   Injectable 5000 Unit(s) SubCutaneous every 8 hours  influenza  Vaccine (HIGH DOSE) 0.7 milliLiter(s) IntraMuscular once  insulin glargine Injectable (LANTUS) 10 Unit(s) SubCutaneous at bedtime  insulin lispro (ADMELOG) corrective regimen sliding scale   SubCutaneous three times a day before meals  insulin lispro Injectable (ADMELOG) 3 Unit(s) SubCutaneous three times a day before meals  metoprolol succinate  milliGRAM(s) Oral daily  sevelamer carbonate 800 milliGRAM(s) Oral three times a day with meals  sodium bicarbonate 1300 milliGRAM(s) Oral three times a day    PRN MEDICATIONS  acetaminophen     Tablet .. 650 milliGRAM(s) Oral every 6 hours PRN  melatonin 5 milliGRAM(s) Oral at bedtime PRN  ondansetron Injectable 4 milliGRAM(s) IV Push every 8 hours PRN      VITAL SIGNS: Last 24 Hours  T(C): 35.9 (21 Mar 2022 04:22), Max: 36.2 (20 Mar 2022 20:18)  T(F): 96.7 (21 Mar 2022 04:22), Max: 97.2 (20 Mar 2022 20:18)  HR: 64 (21 Mar 2022 06:52) (60 - 64)  BP: 145/73 (21 Mar 2022 06:52) (131/69 - 145/73)  BP(mean): 93 (20 Mar 2022 20:18) (93 - 93)  RR: 18 (21 Mar 2022 06:52) (18 - 20)  SpO2: 96% (21 Mar 2022 06:52) (96% - 96%)    LABS:                        11.6   4.64  )-----------( 215      ( 20 Mar 2022 04:30 )             33.6     03-20    130<L>  |  95<L>  |  81<HH>  ----------------------------<  105<H>  4.1   |  16<L>  |  5.4<HH>    Ca    8.0<L>      20 Mar 2022 04:30  Phos  6.0     03-20  Mg     1.8     03-20    TPro  6.3  /  Alb  3.5  /  TBili  0.4  /  DBili  x   /  AST  68<H>  /  ALT  93<H>  /  AlkPhos  186<H>  03-20      CARDIAC MARKERS ( 20 Mar 2022 04:30 )  x     / 0.07 ng/mL / x     / x     / x      CARDIAC MARKERS ( 19 Mar 2022 16:00 )  x     / 0.06 ng/mL / x     / x     / x          RADIOLOGY:    PHYSICAL EXAM:  CONSTITUTIONAL: No acute distress,  AAOx3  HEAD: Atraumatic, normocephalic  EYES: conjunctiva and sclera clear  ENT: No JVD  PULMONARY: Clear to auscultation bilaterally; no wheezes  CARDIOVASCULAR: Regular rate and rhythm; no murmurs, rubs, or gallops  GASTROINTESTINAL: Soft, non-tender, non-distended; bowel sounds present  MUSCULOSKELETAL: no peripheral edema, no vesicular lesions noted on chest and back  NEUROLOGY: non-focal  SKIN: warm and dry

## 2022-03-21 NOTE — PROGRESS NOTE ADULT - ASSESSMENT
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  Pt and daughter all in agreement to initiate HD this admission.  Benefits and risks of dialysis discussed, including infection, bleeding, arrhythmia, hemodynamic complications, death  vascular surgery consult for TDC placement  HD initiation once TDC in place  outpatient HD arrangements for [facility]  cont lasix iv  renal diet / sevelamer / na bicarb po / calcitriol / cinacalcet   entresto start once on dialysis  f/u cardiology  full code  d/w hospitalist, Dr. Moses

## 2022-03-21 NOTE — PROGRESS NOTE ADULT - SUBJECTIVE AND OBJECTIVE BOX
NEPHROLOGY FOLLOW UP NOTE    pt seen and examined  c/o right chest wall and arm pain from zoster  sob better  edema down  + void    PAST MEDICAL & SURGICAL HISTORY:  DM (diabetes mellitus)    CAD (coronary artery disease)    CKD (chronic kidney disease)    HTN (hypertension)    HLD (hyperlipidemia)    CHF (congestive heart failure)      Allergies:  No Known Allergies    Home Medications Reviewed    SOCIAL HISTORY:  Denies ETOH,Smoking,   FAMILY HISTORY:        REVIEW OF SYSTEMS:  CONSTITUTIONAL: No weakness, fevers or chills  EYES/ENT: No visual changes;  No vertigo or throat pain   NECK: No pain or stiffness  RESPIRATORY: No cough, wheezing, hemoptysis; No shortness of breath  CARDIOVASCULAR: + chest pain or palpitations.  GASTROINTESTINAL: No abdominal or epigastric pain. No nausea, vomiting, or hematemesis; No diarrhea or constipation. No melena or hematochezia.  GENITOURINARY: No dysuria, frequency, foamy urine, urinary urgency, incontinence or hematuria  NEUROLOGICAL: No numbness or weakness  SKIN: No itching, burning, rashes, or lesions   VASCULAR: No bilateral lower extremity edema.   All other review of systems is negative unless indicated above.    PHYSICAL EXAM:  Constitutional: NAD  HEENT: anicteric sclera, oropharynx clear, MMM  Neck: No JVD  Respiratory: CTAB, no wheezes, rales or rhonchi  Cardiovascular: S1, S2, RRR  Gastrointestinal: BS+, soft, NT/ND  Extremities: No cyanosis or clubbing. No peripheral edema  Neurological: A/O x 3, no focal deficits  Psychiatric: Normal mood, normal affect  : No CVA tenderness. No franco.   Skin: No rashes  Vascular Access: AVF    Hospital Medications:   MEDICATIONS  (STANDING):  aspirin  chewable 81 milliGRAM(s) Oral daily  atorvastatin 10 milliGRAM(s) Oral at bedtime  calcitriol   Capsule 1 MICROGram(s) Oral daily  carBAMazepine ER Capsule 200 milliGRAM(s) Oral daily  cinacalcet 30 milliGRAM(s) Oral daily  furosemide   Injectable 80 milliGRAM(s) IV Push two times a day  heparin   Injectable 5000 Unit(s) SubCutaneous every 8 hours  influenza  Vaccine (HIGH DOSE) 0.7 milliLiter(s) IntraMuscular once  insulin glargine Injectable (LANTUS) 10 Unit(s) SubCutaneous at bedtime  insulin lispro (ADMELOG) corrective regimen sliding scale   SubCutaneous three times a day before meals  insulin lispro Injectable (ADMELOG) 3 Unit(s) SubCutaneous three times a day before meals  metoprolol succinate  milliGRAM(s) Oral daily  sevelamer carbonate 800 milliGRAM(s) Oral three times a day with meals  sodium bicarbonate 1300 milliGRAM(s) Oral three times a day      advance care planning and directives reviewed         VITALS:  T(F): 96.7 (03-21-22 @ 04:22), Max: 97.5 (03-20-22 @ 12:51)  HR: 64 (03-21-22 @ 06:52)  BP: 145/73 (03-21-22 @ 06:52)  RR: 18 (03-21-22 @ 06:52)  SpO2: 96% (03-21-22 @ 06:52)  Wt(kg): --    03-20 @ 07:01  -  03-21 @ 07:00  --------------------------------------------------------  IN: 480 mL / OUT: 3175 mL / NET: -2695 mL    03-21 @ 07:01  -  03-21 @ 10:40  --------------------------------------------------------  IN: 350 mL / OUT: 400 mL / NET: -50 mL          LABS:  03-20    130<L>  |  95<L>  |  81<HH>  ----------------------------<  105<H>  4.1   |  16<L>  |  5.4<HH>    Ca    8.0<L>      20 Mar 2022 04:30  Phos  6.0     03-20  Mg     1.8     03-20    TPro  6.3  /  Alb  3.5  /  TBili  0.4  /  DBili      /  AST  68<H>  /  ALT  93<H>  /  AlkPhos  186<H>  03-20                          11.6   4.64  )-----------( 215      ( 20 Mar 2022 04:30 )             33.6       Urine Studies:        RADIOLOGY & ADDITIONAL STUDIES:

## 2022-03-21 NOTE — CONSULT NOTE ADULT - ASSESSMENT
ASSESSMENT:   76 yr old Male w/ PMHx significant for CKD5, CAD, CHF, HLD, HTN who presents with sob and lower extremity swelling  Vascular surgery consulted for placement of Tunneled dialysis catheter. Pt had a recent left upper extremity AVF creation approximately 3 weeks ago with Dr. Varela which has not matured yet. Denies any complications post AVF creation.     PLAN:   - Possible Add on tomorrow 3/22/22 for placement of Tunneled dialysis catheter   - Preop patient today  - Plan to be discussed with Attending, Dr. Bond     VASCULAR TEAM SPECTRA: [id number] ASSESSMENT:   76 yr old Male w/ PMHx significant for CKD5, CAD, CHF, HLD, HTN who presents with sob and lower extremity swelling  Vascular surgery consulted for placement of Tunneled dialysis catheter. Pt had a recent left upper extremity AVF creation approximately 3 weeks ago with Dr. Varela which has not matured yet. Denies any complications post AVF creation.     PLAN:   - Scheduled for OR tomorrow 3/22/22 for insertion of Tunneled dialysis catheter   - Preop patient today   - Plan to be discussed with Attending, Dr. Bond     VASCULAR TEAM SPECTRA: [id number]

## 2022-03-21 NOTE — PROGRESS NOTE ADULT - ASSESSMENT
76 yr old m w/ a pmh significant for CKD5, CAD, HFrEF, HLD, HTN who presents with sob and lower extremity swelling. Pt states that he has been having lower extremity swelling and sob for the past couple of days.    A/P:   Acute HFrEF: Ischemic cardiomyopathy  Leg edema, SOB, vascular congestion.   CXR showed bilateral pleural effusion and mild interstitial infiltrates (new).   Echo 3/20/22 showed LVEF <20%, grade II diastolic dysfunction, severe LAE, severe ELLY, mod MR, moderate TR.   Lasix 80mg IV BID.   Continue Metoprolol. Not on ACEi due to CKD.   Per patient he refused ICD in the past.      CAD s/p CABG in 2000  EKG showed new LBBB, TWI II, III, aVF,   Troponin mildly elevated but stable 0.07  Nuclear stress test Jan 2022 showed  large in size severe in severity inferior, inferoseptal defect defect without reversibility.   Continue ASA, Metoprolol and Lipitor.     CKD5/ESRD   s/p Right upper extremity AVF creation, not ready for use.   continue sevelamer, cinacalcet, calcitriol, sodium bicarb.  Nephrology plan for Tunnel cath placement and to start on HD.     Right sided chest pain: likely from recent Herpes zoster infection.  He was treated with Acyclovir.   Started on tegretol 200mg daily.     Prolonged QTc   EKG: QTc 490     DM   cont home insulin dosing: Lantus 10 qhs, lispro 3 tid     DVT ppx heparin   #Progress Note Handoff:  Pending (specify):  improving volume overload, Tunnel cath.   Family discussion:  Disposition: Home.

## 2022-03-21 NOTE — CONSULT NOTE ADULT - SUBJECTIVE AND OBJECTIVE BOX
HPI:  76 yr old m w/ a pmh significant for CKD5, CAD, CHF, HLD, HTN who presents with sob and lower extremity swelling. Pt states that he has been having lower extremity pain and sob for the past couple of days. Pt was told by nephrologist to come to the hospital for admission. Of note, pt did have placement of AV fistula for future dialysis but is not mature next. Patient also was complaining of right sided chest pain and right sided back pain from an episode of shingles. The lesions are crusted over, but the pain still remains. Patient denies fever, chills, cough, abdominal pain, vomiting, constipation and diarrhea.     In ED vitals: /74, HR 58, RR 18, SpO2 97% on RA Patient got one dose of IV lasix in ED.    (19 Mar 2022 14:11)      PAST MEDICAL & SURGICAL HISTORY  DM (diabetes mellitus)    CAD (coronary artery disease)    CKD (chronic kidney disease)    HTN (hypertension)    HLD (hyperlipidemia)    CHF (congestive heart failure)        FAMILY HISTORY:  FAMILY HISTORY:      SOCIAL HISTORY:  []smoker  []Alcohol  []Drug    ALLERGIES:  No Known Allergies      MEDICATIONS:  MEDICATIONS  (STANDING):  aspirin  chewable 81 milliGRAM(s) Oral daily  atorvastatin 10 milliGRAM(s) Oral at bedtime  calcitriol   Capsule 1 MICROGram(s) Oral daily  cinacalcet 30 milliGRAM(s) Oral daily  furosemide   Injectable 80 milliGRAM(s) IV Push two times a day  heparin   Injectable 5000 Unit(s) SubCutaneous every 8 hours  influenza  Vaccine (HIGH DOSE) 0.7 milliLiter(s) IntraMuscular once  insulin glargine Injectable (LANTUS) 10 Unit(s) SubCutaneous at bedtime  insulin lispro (ADMELOG) corrective regimen sliding scale   SubCutaneous three times a day before meals  insulin lispro Injectable (ADMELOG) 3 Unit(s) SubCutaneous three times a day before meals  metoprolol succinate  milliGRAM(s) Oral daily  sevelamer carbonate 800 milliGRAM(s) Oral three times a day with meals  sodium bicarbonate 1300 milliGRAM(s) Oral three times a day    MEDICATIONS  (PRN):  acetaminophen     Tablet .. 650 milliGRAM(s) Oral every 6 hours PRN Temp greater or equal to 38C (100.4F), Mild Pain (1 - 3)  melatonin 5 milliGRAM(s) Oral at bedtime PRN Insomnia  ondansetron Injectable 4 milliGRAM(s) IV Push every 8 hours PRN Nausea and/or Vomiting      HOME MEDICATIONS:  Home Medications:  aspirin 81 mg oral tablet, chewable: 1 tab(s) orally once a day (19 Mar 2022 14:36)  atorvastatin 10 mg oral tablet: 1 tab(s) orally once a day (at bedtime) (19 Mar 2022 14:36)  calcitriol 0.5 mcg oral capsule: 2 cap(s) orally once a day (19 Mar 2022 14:36)  cinacalcet 30 mg oral tablet: 1 tab(s) orally once a day (19 Mar 2022 14:36)  furosemide 80 mg oral tablet: 1 tab(s) orally 2 times a day (19 Mar 2022 14:36)  Insulin Aspart FlexPen 100 units/mL injectable solution: 3 unit(s) injectable 3 times a day (19 Mar 2022 14:36)  NovoLOG FlexPen 100 units/mL injectable solution: 10 unit(s) injectable once a day (at bedtime) (19 Mar 2022 14:36)  Procrit 20,000 units/mL injectable solution: 1 milliliter(s) injectable every 2 weeks (19 Mar 2022 14:36)  Renvela 800 mg oral tablet: 1 tab(s) orally 3 times a day (with meals) (19 Mar 2022 14:36)  sodium bicarbonate 650 mg oral tablet: 2 tab(s) orally 3 times a day (19 Mar 2022 14:36)  Toprol- mg oral tablet, extended release: 1 tab(s) orally once a day (19 Mar 2022 14:36)      VITALS:   T(F): 97.5 ( @ 12:51), Max: 97.5 ( @ 12:51)  HR: 59 ( @ 12:51) (56 - 60)  BP: 143/73 ( @ 12:51) (126/74 - 143/73)  BP(mean): --  RR: 20 ( @ 12:51) (18 - 20)  SpO2: 99% ( @ 19:54) (97% - 99%)    I&O's Summary    20 Mar 2022 07:01  -  20 Mar 2022 14:41  --------------------------------------------------------  IN: 0 mL / OUT: 400 mL / NET: -400 mL        REVIEW OF SYSTEMS:  CONSTITUTIONAL: No weakness, fevers or chills  EYES: No visual changes  ENT: No vertigo or throat pain   NECK: No pain or stiffness  RESPIRATORY: No cough, wheezing, hemoptysis; No shortness of breath  CARDIOVASCULAR: No chest pain or palpitations  GASTROINTESTINAL: No abdominal or epigastric pain. No nausea, vomiting, or hematemesis; No diarrhea or constipation. No melena or hematochezia.  GENITOURINARY: No dysuria, frequency or hematuria  NEUROLOGICAL: No numbness or weakness  SKIN: No itching, no rashes  MSK: no    PHYSICAL EXAM:  NEURO: patient is awake , alert and oriented  GEN: Not in acute distress  NECK: no thyroid enlargement, no JVD  LUNGS: Clear to auscultation bilaterally   CARDIOVASCULAR: S1/S2 present, RRR , no murmurs or rubs, no carotid bruits,  + PP bilaterally  ABD: Soft, non-tender, non-distended, +BS  EXT: No BOO  SKIN: Intact    LABS:                        11.6   4.64  )-----------( 215      ( 20 Mar 2022 04:30 )             33.6     03-20    130<L>  |  95<L>  |  81<HH>  ----------------------------<  105<H>  4.1   |  16<L>  |  5.4<HH>    Ca    8.0<L>      20 Mar 2022 04:30  Phos  6.0     03-20  Mg     1.8     -20    TPro  6.3  /  Alb  3.5  /  TBili  0.4  /  DBili  x   /  AST  68<H>  /  ALT  93<H>  /  AlkPhos  186<H>  -20      Troponin T, Serum: 0.07 ng/mL *HH* (22 @ 04:30)  Troponin T, Serum: 0.06 ng/mL ** (22 @ 16:00)    CARDIAC MARKERS ( 20 Mar 2022 04:30 )  x     / 0.07 ng/mL / x     / x     / x      CARDIAC MARKERS ( 19 Mar 2022 16:00 )  x     / 0.06 ng/mL / x     / x     / x      CARDIAC MARKERS ( 19 Mar 2022 10:22 )  x     / 0.07 ng/mL / x     / x     / x            Troponin trend:    Serum Pro-Brain Natriuretic Peptide: 64703 pg/mL (22 @ 10:22)    03-20 Chol 91 LDL -- HDL 39<L> Trig 58      RADIOLOGY:  -CXR:  Xray Chest 2 Views PA/Lat (22 @ 11:09)  Impression:  Small right pleural effusion/opacity.  -TTE:   TTE Echo Complete w/o Contrast w/ Doppler (22 @ 10:47)  Summary:   1. Left ventricular ejection fraction, by visual estimation, is <20%.   2. Severely decreased global left ventricular systolic function.   3. Spectral Doppler shows pseudonormal pattern of left ventricular   myocardial filling (Grade II diastolic dysfunction).   4. Severely enlarged left atrium.   5. Severely enlarged right atrium.   6. The mitral valve leaflets are tethered due to reduced systolic   function and elevated LVDP.   7. Moderate mitral regurgitation.   8. Moderate tricuspid regurgitation.   9. Mild aortic stenosis.  10. Mild aortic regurgitation.  11. Estimated pulmonary artery systolic pressure is 50.8 mmHg assuming a   right atrial pressure of 8 mmHg, which is consistent with moderate   pulmonary hypertension.  12. LA volume Index is 60.0 ml/m² ml/m2.  -CCTA:  -STRESS TEST:  NM Nuclear Stress Pharmacologic Multiple (22 @ 09:57)   Impression:  1. IV Lexiscan Dual Isotope Study which was negative with respect to   symptoms and EKG changes.  2. Myocardial perfusion imaging reveals infarction in the distribution of   the mid left anterior descending coronary artery which most probably   wraps around the apex and/or right coronary artery with persistent   dilatation of left ventricle similar to previous study 2013  3. Gated imaging reveals akinesia of the distal anterior apex apex and   inferoapical apex with global hypokinesia with ejection fraction severely   reduced at 25% similar to previous study 2013  Recommendation:  Medical therapy.    Risk factor modification.    Compare to previous coronary arteriography as well as operative reports  -CATHETERIZATION:     EC Lead ECG (22 @ 10:37)  Ventricular Rate 57 BPM  Atrial Rate 57 BPM  P-R Interval 146 ms  QRS Duration 172 ms  Q-T Interval 546 ms  QTC Calculation(Bazett) 531 ms  P Axis 33 degrees  R Axis -53 degrees  T Axis 132 degrees    Diagnosis Line Sinus bradycardia  Left axis deviation  Left bundle branch block  Abnormal ECG    TELEMETRY EVENTS:  Sinus/Sinus demetra 50s - 60s  Couplets and Triplets noted on tele monitor
VASCULAR SURGERY CONSULT NOTE    HPI:  76 yr old Male w/ PMHx significant for CKD5, CAD, CHF, HLD, HTN who presents with sob and lower extremity swelling. Pt states that he has been having lower extremity pain and sob for the past couple of days. Pt was told by nephrologist to come to the hospital for admission. Of note, pt did have placement of AV fistula for future dialysis but is not mature next. Patient also was complaining of right sided chest pain and right sided back pain from an episode of shingles. The lesions are crusted over, but the pain still remains. Patient denies fever, chills, cough, abdominal pain, vomiting, constipation and diarrhea.   In ED vitals: /74, HR 58, RR 18, SpO2 97% on RA Patient got one dose of IV lasix in ED.    (19 Mar 2022 14:11)    Vascular surgery consulted for placement of Tunneled dialysis catheter. Pt had a recent left upper extremity AVF creation approximately 3 weeks ago with Dr. Varela which has not matured yet. Denies any complications post AVF creation.     PAST MEDICAL & SURGICAL HISTORY:  DM (diabetes mellitus)  CAD (coronary artery disease)  CKD (chronic kidney disease)  HTN (hypertension)  HLD (hyperlipidemia)  CHF (congestive heart failure)  No Known Allergies    Home Medications:  aspirin 81 mg oral tablet, chewable: 1 tab(s) orally once a day (19 Mar 2022 14:36)  atorvastatin 10 mg oral tablet: 1 tab(s) orally once a day (at bedtime) (19 Mar 2022 14:36)  calcitriol 0.5 mcg oral capsule: 2 cap(s) orally once a day (19 Mar 2022 14:36)  cinacalcet 30 mg oral tablet: 1 tab(s) orally once a day (19 Mar 2022 14:36)  furosemide 80 mg oral tablet: 1 tab(s) orally 2 times a day (19 Mar 2022 14:36)  Insulin Aspart FlexPen 100 units/mL injectable solution: 3 unit(s) injectable 3 times a day (19 Mar 2022 14:36)  NovoLOG FlexPen 100 units/mL injectable solution: 10 unit(s) injectable once a day (at bedtime) (19 Mar 2022 14:36)  Procrit 20,000 units/mL injectable solution: 1 milliliter(s) injectable every 2 weeks (19 Mar 2022 14:36)  Renvela 800 mg oral tablet: 1 tab(s) orally 3 times a day (with meals) (19 Mar 2022 14:36)  sodium bicarbonate 650 mg oral tablet: 2 tab(s) orally 3 times a day (19 Mar 2022 14:36)  Toprol- mg oral tablet, extended release: 1 tab(s) orally once a day (19 Mar 2022 14:36)    No pertinent family history of PVD    PHYSICAL EXAM  Vital Signs Last 24 Hrs  T(C): 35.9 (21 Mar 2022 04:22), Max: 36.4 (20 Mar 2022 12:51)  T(F): 96.7 (21 Mar 2022 04:22), Max: 97.5 (20 Mar 2022 12:51)  HR: 64 (21 Mar 2022 06:52) (59 - 64)  BP: 145/73 (21 Mar 2022 06:52) (131/69 - 145/73)  BP(mean): 93 (20 Mar 2022 20:18) (93 - 93)  RR: 18 (21 Mar 2022 06:52) (18 - 20)  SpO2: 96% (21 Mar 2022 06:52) (96% - 96%)    Appearance: NAD	  HEENT: NCAT, EOMI	  Cardiovascular: S1 S2  Respiratory: Normal respiratory effort	  Gastrointestinal:  Soft, Non-tender, nondistended   Vascular: Palpable radial pulse bilaterally; left upper extremity AVF with +thrill     MEDICATIONS:   MEDICATIONS  (STANDING):  aspirin  chewable 81 milliGRAM(s) Oral daily  atorvastatin 10 milliGRAM(s) Oral at bedtime  calcitriol   Capsule 1 MICROGram(s) Oral daily  carBAMazepine ER Capsule 200 milliGRAM(s) Oral daily  cinacalcet 30 milliGRAM(s) Oral daily  furosemide   Injectable 80 milliGRAM(s) IV Push two times a day  heparin   Injectable 5000 Unit(s) SubCutaneous every 8 hours  influenza  Vaccine (HIGH DOSE) 0.7 milliLiter(s) IntraMuscular once  insulin glargine Injectable (LANTUS) 10 Unit(s) SubCutaneous at bedtime  insulin lispro (ADMELOG) corrective regimen sliding scale   SubCutaneous three times a day before meals  insulin lispro Injectable (ADMELOG) 3 Unit(s) SubCutaneous three times a day before meals  metoprolol succinate  milliGRAM(s) Oral daily  sevelamer carbonate 800 milliGRAM(s) Oral three times a day with meals  sodium bicarbonate 1300 milliGRAM(s) Oral three times a day    MEDICATIONS  (PRN):  acetaminophen     Tablet .. 650 milliGRAM(s) Oral every 6 hours PRN Temp greater or equal to 38C (100.4F), Mild Pain (1 - 3)  melatonin 5 milliGRAM(s) Oral at bedtime PRN Insomnia  ondansetron Injectable 4 milliGRAM(s) IV Push every 8 hours PRN Nausea and/or Vomiting    LAB/STUDIES:                        11.6   4.64  )-----------( 215      ( 20 Mar 2022 04:30 )             33.6     03-20    130<L>  |  95<L>  |  81<HH>  ----------------------------<  105<H>  4.1   |  16<L>  |  5.4<HH>    Ca    8.0<L>      20 Mar 2022 04:30  Phos  6.0     03-20  Mg     1.8     03-20    TPro  6.3  /  Alb  3.5  /  TBili  0.4  /  DBili  x   /  AST  68<H>  /  ALT  93<H>  /  AlkPhos  186<H>  03-20    LIVER FUNCTIONS - ( 20 Mar 2022 04:30 )  Alb: 3.5 g/dL / Pro: 6.3 g/dL / ALK PHOS: 186 U/L / ALT: 93 U/L / AST: 68 U/L / GGT: x           CARDIAC MARKERS ( 20 Mar 2022 04:30 )  x     / 0.07 ng/mL / x     / x     / x      CARDIAC MARKERS ( 19 Mar 2022 16:00 )  x     / 0.06 ng/mL / x     / x     / x        IMAGING:

## 2022-03-21 NOTE — PROGRESS NOTE ADULT - ASSESSMENT
Patient is a 76y old Male  pmh significant for CKD5, CAD, CHF, HLD, HTN who presents with sob and lower extremity swelling.    Currently admitted to medicine with the primary diagnosis of CHF exacerbation    #Dyspnea and LE swelling 2/2 volume overload from HFrEF and CKD5/ESRD   #Hyponatremia 2/2 volume overload   - BNP 46,457, CXR: small right pleural effusion/opacities  - Echo 3/19/21: EF 20-25%, G1DD, Mild MR, Mild AR, Mild ID  - repeat Echo: EF <20%, severely enlarged L+R atrium, moderate MR, TR, mild AS, moderate pulm HTN (50.8mmHg)  - patient refusing AICD  - not on ACEi d/t CKD, plan is for entresto once pt starts HD  - cont 80mg IV bid   - monitor Is and Os, daily weights   - cont metoprolol succinate 100 qd   - monitor labs BID    #CKD5/ESRD   #Elevated Troponin 2/2 CKD   - Right upper extremity immature AVF   - Trop 0.07 -> cont to trend  - cont sevelamer, cinacalcet, calcitriol, sodium bicarb as per nephro  - Nephro consult - Dr. Spencer   - avoid nephrotoxic agents   - plan is for tunneled cath tmmr for HD  - NPO after midnight  - preop labs    #Post Herpetic Neuralgia   - Shingles treated 2 weeks ago with acyclovir   - start carbamazepine 200mg daily     #DM   - a1c 7.5  - cont home insulin dosing: Lantus 10 qhs, lispro 3 tid     #HTN   - controlled off meds     #HLD  - cont/w atorvastatin 10mg daily    #CAD   - cont aspirin 81mg  - c/w lipitor 10mg  - c/w  metoprolol 100mg daily    #Misc   Diet DASH  DVT ppx heparin   GI ppx none  Activity IAT

## 2022-03-22 NOTE — PROGRESS NOTE ADULT - ASSESSMENT
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

## 2022-03-22 NOTE — PROGRESS NOTE ADULT - SUBJECTIVE AND OBJECTIVE BOX
WILBERT JULIAN 76y Male  MRN#: [id number]   Hospital Day: 3d    SUBJECTIVE  Patient is a 76y old Male  pmh significant for CKD5, CAD, CHF, HLD, HTN who presents with sob and lower extremity swelling.    Currently admitted to medicine with the primary diagnosis of CHF exacerbation      INTERVAL HPI AND OVERNIGHT EVENTS:  Patient was examined and seen at bedside. This morning he is resting comfortably in bed and reports no issues or overnight events.    REVIEW OF SYMPTOMS:      OBJECTIVE  PAST MEDICAL & SURGICAL HISTORY  DM (diabetes mellitus)    CAD (coronary artery disease)    CKD (chronic kidney disease)    HTN (hypertension)    HLD (hyperlipidemia)    CHF (congestive heart failure)      ALLERGIES:  No Known Allergies    MEDICATIONS:  STANDING MEDICATIONS  aspirin  chewable 81 milliGRAM(s) Oral daily  atorvastatin 10 milliGRAM(s) Oral at bedtime  calcitriol   Capsule 1 MICROGram(s) Oral daily  carBAMazepine ER Capsule 200 milliGRAM(s) Oral daily  chlorhexidine 4% Liquid 1 Application(s) Topical <User Schedule>  cinacalcet 30 milliGRAM(s) Oral daily  furosemide   Injectable 80 milliGRAM(s) IV Push two times a day  heparin   Injectable 5000 Unit(s) SubCutaneous every 8 hours  influenza  Vaccine (HIGH DOSE) 0.7 milliLiter(s) IntraMuscular once  insulin glargine Injectable (LANTUS) 10 Unit(s) SubCutaneous at bedtime  insulin lispro (ADMELOG) corrective regimen sliding scale   SubCutaneous three times a day before meals  insulin lispro Injectable (ADMELOG) 3 Unit(s) SubCutaneous three times a day before meals  metoprolol succinate  milliGRAM(s) Oral daily  sevelamer carbonate 800 milliGRAM(s) Oral three times a day with meals  sodium bicarbonate 1300 milliGRAM(s) Oral three times a day    PRN MEDICATIONS  acetaminophen     Tablet .. 650 milliGRAM(s) Oral every 6 hours PRN  melatonin 5 milliGRAM(s) Oral at bedtime PRN  ondansetron Injectable 4 milliGRAM(s) IV Push every 8 hours PRN      VITAL SIGNS: Last 24 Hours  T(C): 35.9 (22 Mar 2022 05:25), Max: 36.2 (21 Mar 2022 13:42)  T(F): 96.6 (22 Mar 2022 05:25), Max: 97.1 (21 Mar 2022 13:42)  HR: 64 (22 Mar 2022 05:25) (63 - 66)  BP: 126/65 (22 Mar 2022 05:25) (126/65 - 136/70)  BP(mean): 95 (21 Mar 2022 17:28) (95 - 95)  RR: 18 (22 Mar 2022 05:17) (18 - 20)  SpO2: 97% (22 Mar 2022 05:17) (96% - 97%)    LABS:                        12.1   5.07  )-----------( 222      ( 22 Mar 2022 06:00 )             35.3     03-21    131<L>  |  94<L>  |  81<HH>  ----------------------------<  267<H>  4.0   |  20  |  5.6<HH>    Ca    8.5      21 Mar 2022 14:09  Phos  5.6     03-21  Mg     1.9     03-21    TPro  6.0  /  Alb  3.6  /  TBili  0.4  /  DBili  x   /  AST  42<H>  /  ALT  77<H>  /  AlkPhos  155<H>  03-21    PT/INR - ( 21 Mar 2022 16:00 )   PT: 14.00 sec;   INR: 1.22 ratio         PTT - ( 21 Mar 2022 14:09 )  PTT:34.0 sec    RADIOLOGY:      PHYSICAL EXAM:  CONSTITUTIONAL: No acute distress, well-developed, well-groomed, AAOx3  HEAD: Atraumatic, normocephalic  EYES: EOM intact, PERRLA, conjunctiva and sclera clear  ENT: Supple, no masses, no thyromegaly, no bruits, no JVD; moist mucous membranes  PULMONARY: Clear to auscultation bilaterally; no wheezes, rales, or rhonchi  CARDIOVASCULAR: Regular rate and rhythm; no murmurs, rubs, or gallops  GASTROINTESTINAL: Soft, non-tender, non-distended; bowel sounds present  MUSCULOSKELETAL: 2+ peripheral pulses; no clubbing, no cyanosis, no edema  NEUROLOGY: non-focal  SKIN: No rashes or lesions; warm and dry     WILBERT JULIAN 76y Male  MRN#: [id number]   Hospital Day: 3d    SUBJECTIVE  Patient is a 76y old Male  pmh significant for CKD5, CAD, CHF, HLD, HTN who presents with sob and lower extremity swelling.    Currently admitted to medicine with the primary diagnosis of CHF exacerbation      INTERVAL HPI AND OVERNIGHT EVENTS:  Patient was examined and seen at bedside. This morning he is resting comfortably in bed and reports no issues or overnight events.    REVIEW OF SYMPTOMS:  This morning he is still complaining of right sided chest and upper back pain. Denies any SOB, CP, palpitations otherwise. No fever, sweats or chills.     OBJECTIVE  PAST MEDICAL & SURGICAL HISTORY  DM (diabetes mellitus)    CAD (coronary artery disease)    CKD (chronic kidney disease)    HTN (hypertension)    HLD (hyperlipidemia)    CHF (congestive heart failure)      ALLERGIES:  No Known Allergies    MEDICATIONS:  STANDING MEDICATIONS  aspirin  chewable 81 milliGRAM(s) Oral daily  atorvastatin 10 milliGRAM(s) Oral at bedtime  calcitriol   Capsule 1 MICROGram(s) Oral daily  carBAMazepine ER Capsule 200 milliGRAM(s) Oral daily  chlorhexidine 4% Liquid 1 Application(s) Topical <User Schedule>  cinacalcet 30 milliGRAM(s) Oral daily  furosemide   Injectable 80 milliGRAM(s) IV Push two times a day  heparin   Injectable 5000 Unit(s) SubCutaneous every 8 hours  influenza  Vaccine (HIGH DOSE) 0.7 milliLiter(s) IntraMuscular once  insulin glargine Injectable (LANTUS) 10 Unit(s) SubCutaneous at bedtime  insulin lispro (ADMELOG) corrective regimen sliding scale   SubCutaneous three times a day before meals  insulin lispro Injectable (ADMELOG) 3 Unit(s) SubCutaneous three times a day before meals  metoprolol succinate  milliGRAM(s) Oral daily  sevelamer carbonate 800 milliGRAM(s) Oral three times a day with meals  sodium bicarbonate 1300 milliGRAM(s) Oral three times a day    PRN MEDICATIONS  acetaminophen     Tablet .. 650 milliGRAM(s) Oral every 6 hours PRN  melatonin 5 milliGRAM(s) Oral at bedtime PRN  ondansetron Injectable 4 milliGRAM(s) IV Push every 8 hours PRN      VITAL SIGNS: Last 24 Hours  T(C): 35.9 (22 Mar 2022 05:25), Max: 36.2 (21 Mar 2022 13:42)  T(F): 96.6 (22 Mar 2022 05:25), Max: 97.1 (21 Mar 2022 13:42)  HR: 64 (22 Mar 2022 05:25) (63 - 66)  BP: 126/65 (22 Mar 2022 05:25) (126/65 - 136/70)  BP(mean): 95 (21 Mar 2022 17:28) (95 - 95)  RR: 18 (22 Mar 2022 05:17) (18 - 20)  SpO2: 97% (22 Mar 2022 05:17) (96% - 97%)    LABS:                        12.1   5.07  )-----------( 222      ( 22 Mar 2022 06:00 )             35.3     03-21    131<L>  |  94<L>  |  81<HH>  ----------------------------<  267<H>  4.0   |  20  |  5.6<HH>    Ca    8.5      21 Mar 2022 14:09  Phos  5.6     03-21  Mg     1.9     03-21    TPro  6.0  /  Alb  3.6  /  TBili  0.4  /  DBili  x   /  AST  42<H>  /  ALT  77<H>  /  AlkPhos  155<H>  03-21    PT/INR - ( 21 Mar 2022 16:00 )   PT: 14.00 sec;   INR: 1.22 ratio         PTT - ( 21 Mar 2022 14:09 )  PTT:34.0 sec    RADIOLOGY:      PHYSICAL EXAM:  CONSTITUTIONAL: No acute distress,  AAOx3  HEAD: Atraumatic, normocephalic  EYES: conjunctiva and sclera clear  ENT: No JVD  PULMONARY: Clear to auscultation bilaterally; no wheezes  CARDIOVASCULAR: Regular rate and rhythm; no murmurs, s1, s2  GASTROINTESTINAL: Soft, non-tender, non-distended; bowel sounds present  MUSCULOSKELETAL: no peripheral edema, no vesicular lesions noted on chest and back  NEUROLOGY: non-focal  SKIN: warm and dry

## 2022-03-22 NOTE — PROGRESS NOTE ADULT - SUBJECTIVE AND OBJECTIVE BOX
WILBERT JULIAN  76y  Male      Patient is a 76y old  Male who presents with a chief complaint of fluid overload (22 Mar 2022 07:19)      INTERVAL HPI/OVERNIGHT EVENTS:  Still with right chest pain burning sensation, mild SOB.   Vital Signs Last 24 Hrs  T(C): 36.3 (22 Mar 2022 12:44), Max: 36.3 (22 Mar 2022 12:15)  T(F): 97.4 (22 Mar 2022 12:15), Max: 97.4 (22 Mar 2022 12:15)  HR: 63 (22 Mar 2022 12:44) (63 - 66)  BP: 137/73 (22 Mar 2022 12:44) (126/65 - 137/73)  BP(mean): 95 (21 Mar 2022 17:28) (95 - 95)  RR: 16 (22 Mar 2022 12:44) (16 - 20)  SpO2: 97% (22 Mar 2022 12:44) (96% - 97%)      03-21-22 @ 07:01  -  03-22-22 @ 07:00  --------------------------------------------------------  IN: 740 mL / OUT: 3850 mL / NET: -3110 mL    03-22-22 @ 07:01  -  03-22-22 @ 13:38  --------------------------------------------------------  IN: 0 mL / OUT: 725 mL / NET: -725 mL            Consultant(s) Notes Reviewed:  [x ] YES  [ ] NO          MEDICATIONS  (STANDING):  aspirin  chewable 81 milliGRAM(s) Oral daily  atorvastatin 10 milliGRAM(s) Oral at bedtime  calcitriol   Capsule 1 MICROGram(s) Oral daily  carBAMazepine ER Capsule 200 milliGRAM(s) Oral daily  chlorhexidine 4% Liquid 1 Application(s) Topical <User Schedule>  cinacalcet 30 milliGRAM(s) Oral daily  furosemide   Injectable 80 milliGRAM(s) IV Push two times a day  heparin   Injectable 5000 Unit(s) SubCutaneous every 8 hours  influenza  Vaccine (HIGH DOSE) 0.7 milliLiter(s) IntraMuscular once  insulin glargine Injectable (LANTUS) 10 Unit(s) SubCutaneous at bedtime  insulin lispro (ADMELOG) corrective regimen sliding scale   SubCutaneous three times a day before meals  insulin lispro Injectable (ADMELOG) 3 Unit(s) SubCutaneous three times a day before meals  metoprolol succinate  milliGRAM(s) Oral daily  sevelamer carbonate 800 milliGRAM(s) Oral three times a day with meals  sodium bicarbonate 1300 milliGRAM(s) Oral three times a day    MEDICATIONS  (PRN):  acetaminophen     Tablet .. 650 milliGRAM(s) Oral every 6 hours PRN Temp greater or equal to 38C (100.4F), Mild Pain (1 - 3)  melatonin 5 milliGRAM(s) Oral at bedtime PRN Insomnia  ondansetron Injectable 4 milliGRAM(s) IV Push every 8 hours PRN Nausea and/or Vomiting      LABS                          12.1   5.07  )-----------( 222      ( 22 Mar 2022 06:00 )             35.3     03-22    137  |  96<L>  |  85<HH>  ----------------------------<  75  4.2   |  23  |  5.7<HH>    Ca    8.7      22 Mar 2022 06:00  Phos  5.6     03-21  Mg     1.9     03-22    TPro  6.9  /  Alb  4.1  /  TBili  0.5  /  DBili  x   /  AST  51<H>  /  ALT  92<H>  /  AlkPhos  151<H>  03-22        PT/INR - ( 21 Mar 2022 16:00 )   PT: 14.00 sec;   INR: 1.22 ratio         PTT - ( 21 Mar 2022 14:09 )  PTT:34.0 sec  Lactate Trend        CAPILLARY BLOOD GLUCOSE      POCT Blood Glucose.: 80 mg/dL (22 Mar 2022 11:25)        RADIOLOGY & ADDITIONAL TESTS:    Imaging Personally Reviewed:  [ ] YES  [ ] NO    HEALTH ISSUES - PROBLEM Dx:          PHYSICAL EXAM:  GENERAL: NAD, well-developed.  HEAD:  Atraumatic, Normocephalic.  EYES: EOMI, PERRLA, conjunctiva and sclera clear.  NECK: Supple, No JVD.  CHEST/LUNG: decreased breath sounds on bases, bilateral lower lung rales.   HEART: Regular rate and rhythm; S1 S2.   ABDOMEN: Soft, Nontender, Nondistended; Bowel sounds present.  EXTREMITIES:  leg edema 2+  PSYCH: AAOx3.  NEUROLOGY: non-focal.  SKIN: No rashes or lesions.

## 2022-03-22 NOTE — PROGRESS NOTE ADULT - ASSESSMENT
76 yr old m w/ a pmh significant for CKD5, CAD, HFrEF, HLD, HTN who presents with sob and lower extremity swelling. Pt states that he has been having lower extremity swelling and sob for the past couple of days.    A/P:   Acute HFrEF: Ischemic cardiomyopathy  Leg edema, SOB, vascular congestion.   CXR showed bilateral pleural effusion and mild interstitial infiltrates (new).   Echo 3/20/22 showed LVEF <20%, grade II diastolic dysfunction, severe LAE, severe ELLY, mod MR, moderate TR.   Lasix 80mg IV BID.   Continue Metoprolol. Not on ACEi due to CKD.   Per patient he refused ICD in the past.      CAD s/p CABG in 2000  EKG showed new LBBB, TWI II, III, aVF,   Troponin mildly elevated but stable 0.07  Nuclear stress test Jan 2022 showed  large in size severe in severity inferior, inferoseptal defect defect without reversibility.   Continue ASA, Metoprolol and Lipitor.     CKD5/ESRD   s/p Right upper extremity AVF creation, not ready for use.   continue sevelamer, cinacalcet, calcitriol, sodium bicarb.  Nephrology plan for Tunnel cath placement and to start on HD.     Right sided chest pain: likely from recent Herpes zoster infection.  He was treated with Acyclovir.   Continue tegretol 200mg daily.     Prolonged QTc   EKG: QTc 490     DM   cont home insulin dosing: Lantus 10 qhs, lispro 3 tid     DVT ppx heparin   #Progress Note Handoff:  Pending (specify):  improving volume overload, Tunnel cath today.    Family discussion:  Disposition: Home.

## 2022-03-22 NOTE — PROGRESS NOTE ADULT - SUBJECTIVE AND OBJECTIVE BOX
NEPHROLOGY FOLLOW UP NOTE    diuresing well  for TDC   on RA  BP's acceptable    PAST MEDICAL & SURGICAL HISTORY:  DM (diabetes mellitus)    CAD (coronary artery disease)    CKD (chronic kidney disease)    HTN (hypertension)    HLD (hyperlipidemia)    CHF (congestive heart failure)      Allergies:  No Known Allergies    Home Medications Reviewed    SOCIAL HISTORY:  Denies ETOH,Smoking,   FAMILY HISTORY:        REVIEW OF SYSTEMS:  CONSTITUTIONAL: No weakness, fevers or chills  EYES/ENT: No visual changes;  No vertigo or throat pain   NECK: No pain or stiffness  RESPIRATORY: No cough, wheezing, hemoptysis; No shortness of breath  CARDIOVASCULAR: + chest pain or palpitations.  GASTROINTESTINAL: No abdominal or epigastric pain. No nausea, vomiting, or hematemesis; No diarrhea or constipation. No melena or hematochezia.  GENITOURINARY: No dysuria, frequency, foamy urine, urinary urgency, incontinence or hematuria  NEUROLOGICAL: No numbness or weakness  SKIN: No itching, burning, rashes, or lesions   VASCULAR: No bilateral lower extremity edema.   All other review of systems is negative unless indicated above.    PHYSICAL EXAM:  Constitutional: NAD  HEENT: anicteric sclera, oropharynx clear, MMM  Neck: No JVD  Respiratory: CTAB, no wheezes, rales or rhonchi  Cardiovascular: S1, S2, RRR  Gastrointestinal: BS+, soft, NT/ND  Extremities: No cyanosis or clubbing. No peripheral edema  Neurological: A/O x 3, no focal deficits  Psychiatric: Normal mood, normal affect  : No CVA tenderness. No franco.   Skin: No rashes  Vascular Access: [facility]    Hospital Medications:   MEDICATIONS  (STANDING):  aspirin  chewable 81 milliGRAM(s) Oral daily  atorvastatin 10 milliGRAM(s) Oral at bedtime  calcitriol   Capsule 1 MICROGram(s) Oral daily  carBAMazepine ER Capsule 200 milliGRAM(s) Oral daily  chlorhexidine 4% Liquid 1 Application(s) Topical <User Schedule>  cinacalcet 30 milliGRAM(s) Oral daily  furosemide   Injectable 80 milliGRAM(s) IV Push two times a day  heparin   Injectable 5000 Unit(s) SubCutaneous every 8 hours  influenza  Vaccine (HIGH DOSE) 0.7 milliLiter(s) IntraMuscular once  insulin glargine Injectable (LANTUS) 10 Unit(s) SubCutaneous at bedtime  insulin lispro (ADMELOG) corrective regimen sliding scale   SubCutaneous three times a day before meals  insulin lispro Injectable (ADMELOG) 3 Unit(s) SubCutaneous three times a day before meals  metoprolol succinate  milliGRAM(s) Oral daily  sevelamer carbonate 800 milliGRAM(s) Oral three times a day with meals  sodium bicarbonate 1300 milliGRAM(s) Oral three times a day        VITALS:  T(F): 96.1 (03-22-22 @ 13:59), Max: 97.4 (03-22-22 @ 12:15)  HR: 66 (03-22-22 @ 13:59)  BP: 139/72 (03-22-22 @ 13:59)  RR: 18 (03-22-22 @ 13:59)  SpO2: 97% (03-22-22 @ 12:44)  Wt(kg): --    03-20 @ 07:01  -  03-21 @ 07:00  --------------------------------------------------------  IN: 480 mL / OUT: 3175 mL / NET: -2695 mL    03-21 @ 07:01  -  03-22 @ 07:00  --------------------------------------------------------  IN: 740 mL / OUT: 3850 mL / NET: -3110 mL    03-22 @ 07:01  -  03-22 @ 17:34  --------------------------------------------------------  IN: 0 mL / OUT: 725 mL / NET: -725 mL      Height (cm): 170.2 (03-22 @ 12:44)  Weight (kg): 62.5 (03-22 @ 12:44)  BMI (kg/m2): 21.6 (03-22 @ 12:44)  BSA (m2): 1.73 (03-22 @ 12:44)    LABS:  03-22    137  |  96<L>  |  85<HH>  ----------------------------<  75  4.2   |  23  |  5.7<HH>    Ca    8.7      22 Mar 2022 06:00  Phos  5.6     03-21  Mg     1.9     03-22    TPro  6.9  /  Alb  4.1  /  TBili  0.5  /  DBili      /  AST  51<H>  /  ALT  92<H>  /  AlkPhos  151<H>  03-22                          12.1   5.07  )-----------( 222      ( 22 Mar 2022 06:00 )             35.3       Urine Studies:        RADIOLOGY & ADDITIONAL STUDIES:

## 2022-03-22 NOTE — PROGRESS NOTE ADULT - SUBJECTIVE AND OBJECTIVE BOX
INTERVENTIONAL RADIOLOGY BRIEF-OPERATIVE NOTE    Procedure:  Right IJ tunneled dialysis catheter    Pre-Op Diagnosis: Renal Failure    Post-Op Diagnosis: Same    Attending: Gene  Resident: None    Anesthesia (type):  [ ] General Anesthesia  [x] Sedation administered by Anesthesia  [ ] Spinal Anesthesia  [x] Local/Regional    Contrast: None    Estimated Blood Loss: Minimal    Condition:   [ ] Critical  [ ] Serious  [ ] Fair   [x] Good    Findings/Follow up Plan of Care: Successful right tunneled dialysis catheter insertion.  Tip of the catheter in the right atrium and ready for use immediately.      Complications:  None    Disposition:  Radiology recovery area.        Please call Interventional Radiology [phone] with any questions, concerns, or issues.

## 2022-03-22 NOTE — PROGRESS NOTE ADULT - ASSESSMENT
Patient is a 76y old Male  pmh significant for CKD5, CAD, CHF, HLD, HTN who presents with sob and lower extremity swelling.    Currently admitted to medicine with the primary diagnosis of CHF exacerbation    #Dyspnea and LE swelling 2/2 volume overload from HFrEF and CKD5/ESRD   #Hyponatremia 2/2 volume overload   - BNP 46,457, CXR: small right pleural effusion/opacities  - Echo 3/19/21: EF 20-25%, G1DD, Mild MR, Mild AR, Mild [place]  - repeat Echo: EF <20%, severely enlarged L+R atrium, moderate MR, TR, mild AS, moderate pulm HTN (50.8mmHg)  - patient refusing AICD  - not on ACEi d/t CKD, plan is for entresto once pt starts HD  - cont 80mg IV bid   - monitor Is and Os, daily weights   - cont metoprolol succinate 100 qd   - monitor labs BID    #CKD5/ESRD   #Elevated Troponin 2/2 CKD   - Right upper extremity immature AVF   - Trop 0.07 -> cont to trend  - cont sevelamer, cinacalcet, calcitriol, sodium bicarb as per nephro  - Nephro consult - Dr. Spencer   - avoid nephrotoxic agents   - plan is for tunneled cath tmmr for HD  - NPO after midnight  - preop labs    #Post Herpetic Neuralgia   - Shingles treated 2 weeks ago with acyclovir   - start carbamazepine 200mg daily     #DM   - a1c 7.5  - cont home insulin dosing: Lantus 10 qhs, lispro 3 tid     #HTN   - controlled off meds     #HLD  - cont/w atorvastatin 10mg daily    #CAD   - cont aspirin 81mg  - c/w lipitor 10mg  - c/w  metoprolol 100mg daily    #Misc   Diet DASH  DVT ppx heparin   GI ppx none  Activity IAT      Patient is a 76y old Male  pmh significant for CKD5, CAD, CHF, HLD, HTN who presents with sob and lower extremity swelling.    Currently admitted to medicine with the primary diagnosis of CHF exacerbation    #Dyspnea and LE swelling 2/2 volume overload from HFrEF and CKD5/ESRD   #Hyponatremia 2/2 volume overload   - BNP 46,457, CXR: small right pleural effusion/opacities  - Echo 3/19/21: EF 20-25%, G1DD, Mild MR, Mild AR, Mild [place]  - repeat Echo: EF <20%, severely enlarged L+R atrium, moderate MR, TR, mild AS, moderate pulm HTN (50.8mmHg)  - patient refusing AICD  - not on ACEi d/t CKD, plan is for entresto once pt starts HD  - cont 80mg IV bid   - monitor Is and Os, daily weights   - cont metoprolol succinate 100 qd   - monitor labs BID    #CKD5/ESRD   #Elevated Troponin 2/2 CKD   - Right upper extremity immature AVF   - Trop 0.07 -> cont to trend  - cont sevelamer, cinacalcet, calcitriol, sodium bicarb as per nephro  - Nephro consult - Dr. Spencer   - avoid nephrotoxic agents   - tunneled cath planned today for HD  - Keep NPO for tunneled cath    #Post Herpetic Neuralgia   - Shingles treated 2 weeks ago with acyclovir   - start carbamazepine 200mg daily     #DM   - a1c 7.5  - cont home insulin dosing: Lantus 10 qhs, lispro 3 tid     #HTN   - controlled off meds     #HLD  - cont/w atorvastatin 10mg daily    #CAD   - cont aspirin 81mg  - c/w lipitor 10mg  - c/w  metoprolol 100mg daily    #Misc   Diet DASH  DVT ppx heparin   GI ppx none  Activity IAT   Dsipo: home pending tunneled cath today and start of HD

## 2022-03-23 NOTE — PROGRESS NOTE ADULT - SUBJECTIVE AND OBJECTIVE BOX
NEPHROLOGY FOLLOW UP NOTE    pt seen and examined on HD    PAST MEDICAL & SURGICAL HISTORY:  DM (diabetes mellitus)    CAD (coronary artery disease)    CKD (chronic kidney disease)    HTN (hypertension)    HLD (hyperlipidemia)    CHF (congestive heart failure)      Allergies:  No Known Allergies    Home Medications Reviewed    SOCIAL HISTORY:  Denies ETOH,Smoking,   FAMILY HISTORY:        REVIEW OF SYSTEMS:  CONSTITUTIONAL: No weakness, fevers or chills  EYES/ENT: No visual changes;  No vertigo or throat pain   NECK: No pain or stiffness  RESPIRATORY: No cough, wheezing, hemoptysis; No shortness of breath  CARDIOVASCULAR: + chest pain or palpitations.  GASTROINTESTINAL: No abdominal or epigastric pain. No nausea, vomiting, or hematemesis; No diarrhea or constipation. No melena or hematochezia.  GENITOURINARY: No dysuria, frequency, foamy urine, urinary urgency, incontinence or hematuria  NEUROLOGICAL: No numbness or weakness  SKIN: No itching, burning, rashes, or lesions   VASCULAR: No bilateral lower extremity edema.   All other review of systems is negative unless indicated above.    PHYSICAL EXAM:  Constitutional: NAD  HEENT: anicteric sclera, oropharynx clear, MMM  Neck: No JVD  Respiratory: CTAB, no wheezes, rales or rhonchi  Cardiovascular: S1, S2, RRR  Gastrointestinal: BS+, soft, NT/ND  Extremities: No cyanosis or clubbing. No peripheral edema  Neurological: A/O x 3, no focal deficits  Psychiatric: Normal mood, normal affect  : No CVA tenderness. No franco.   Skin: No rashes  Vascular Access: AVF, ACW TDC    meds - reviewed    labs - reviewed

## 2022-03-23 NOTE — PROGRESS NOTE ADULT - SUBJECTIVE AND OBJECTIVE BOX
IESHA WILBERT  76y Male    INTERVAL HPI/OVERNIGHT EVENTS:    Pt seen during HD today   no fever, SOB, N/V, abdominal pain  still with discomfort from postherpetic neuralgia - on tegretol  ROS otherwise negative    T(F): 96.4 (03-23-22 @ 13:27), Max: 97.2 (03-23-22 @ 05:00)  HR: 64 (03-23-22 @ 13:27) (61 - 68)  BP: 116/55 (03-23-22 @ 13:27) (116/55 - 142/69)  RR: 16 (03-23-22 @ 13:27) (16 - 18)  SpO2: 95% (03-23-22 @ 08:00) (95% - 97%) on RA    I&O's Summary    22 Mar 2022 07:01  -  23 Mar 2022 07:00  --------------------------------------------------------  IN: 360 mL / OUT: 1365 mL / NET: -1005 mL    23 Mar 2022 07:01  -  23 Mar 2022 15:10  --------------------------------------------------------  IN: 270 mL / OUT: 1200 mL / NET: -930 mL      CAPILLARY BLOOD GLUCOSE      POCT Blood Glucose.: 207 mg/dL (23 Mar 2022 11:25)  POCT Blood Glucose.: 235 mg/dL (23 Mar 2022 10:33)  POCT Blood Glucose.: 80 mg/dL (23 Mar 2022 08:01)  POCT Blood Glucose.: 287 mg/dL (22 Mar 2022 21:09)        PHYSICAL EXAM:  GENERAL: NAD  HEAD:  Normocephalic  EYES:  conjunctiva and sclera clear  ENMT: Moist mucous membranes  NERVOUS SYSTEM:  Alert, awake, Good concentration  CHEST/LUNG: CTA b/l (only anterior auscultated at HD)  HEART: Regular rate and rhythm  ABDOMEN: Soft, Nontender, Nondistended; Bowel sounds present  EXTREMITIES:   No edema  SKIN: HD catheter in right IJ    Consultant(s) Notes Reviewed:  [x ] YES  [ ] NO  Care Discussed with Consultants/Other Providers [ x] YES  [ ] NO    MEDICATIONS  (STANDING):  aspirin  chewable 81 milliGRAM(s) Oral daily  atorvastatin 10 milliGRAM(s) Oral at bedtime  calcitriol   Capsule 1 MICROGram(s) Oral daily  carBAMazepine ER Capsule 200 milliGRAM(s) Oral daily  chlorhexidine 4% Liquid 1 Application(s) Topical <User Schedule>  cinacalcet 30 milliGRAM(s) Oral daily  furosemide    Tablet 80 milliGRAM(s) Oral two times a day  heparin   Injectable 5000 Unit(s) SubCutaneous every 8 hours  influenza  Vaccine (HIGH DOSE) 0.7 milliLiter(s) IntraMuscular once  insulin glargine Injectable (LANTUS) 10 Unit(s) SubCutaneous at bedtime  insulin lispro (ADMELOG) corrective regimen sliding scale   SubCutaneous three times a day before meals  insulin lispro Injectable (ADMELOG) 3 Unit(s) SubCutaneous three times a day before meals  metoprolol succinate  milliGRAM(s) Oral daily  sevelamer carbonate 800 milliGRAM(s) Oral three times a day with meals    MEDICATIONS  (PRN):  acetaminophen     Tablet .. 650 milliGRAM(s) Oral every 6 hours PRN Temp greater or equal to 38C (100.4F), Mild Pain (1 - 3)  melatonin 5 milliGRAM(s) Oral at bedtime PRN Insomnia  ondansetron Injectable 4 milliGRAM(s) IV Push every 8 hours PRN Nausea and/or Vomiting      LABS:                        11.8   5.02  )-----------( 219      ( 23 Mar 2022 06:00 )             35.8     03-23    137  |  96<L>  |  89<HH>  ----------------------------<  82  4.2   |  22  |  5.9<HH>    Ca    8.4<L>      23 Mar 2022 06:00  Mg     2.0     03-23    TPro  6.9  /  Alb  4.0  /  TBili  0.5  /  DBili  x   /  AST  64<H>  /  ALT  110<H>  /  AlkPhos  139<H>  03-23    PT/INR - ( 21 Mar 2022 16:00 )   PT: 14.00 sec;   INR: 1.22 ratio               RADIOLOGY & ADDITIONAL TESTS:    Imaging or report Personally Reviewed:  [ x] YES  [ ] NO    < from: TTE Echo Complete w/o Contrast w/ Doppler (03.20.22 @ 10:47) >  Summary:   1. Left ventricular ejection fraction, by visual estimation, is <20%.   2. Severely decreased global left ventricular systolic function.   3. Spectral Doppler shows pseudonormal pattern of left ventricular   myocardial filling (Grade II diastolic dysfunction).   4. Severely enlarged left atrium.   5. Severely enlarged right atrium.   6. The mitral valve leaflets are tethered due to reduced systolic   function and elevated LVDP.   7. Moderate mitral regurgitation.   8. Moderate tricuspid regurgitation.   9. Mild aortic stenosis.  10. Mild aortic regurgitation.  11. Estimated pulmonary artery systolic pressure is 50.8 mmHg assuming a   right atrial pressure of 8 mmHg, which is consistent with moderate   pulmonary hypertension.  12. LA volume Index is 60.0 ml/m² ml/m2.    < end of copied text >      Case discussed with residents and RN on rounds today    Care discussed with pt

## 2022-03-23 NOTE — PROGRESS NOTE ADULT - ASSESSMENT
77 y/o man with PMH significant for CKD5, CAD, HFrEF, HLD and HTN presents with sob and lower extremity swelling. Pt states that he has been having lower extremity swelling and sob for the past couple of days.    1. Acute HFrEF  Leg edema, SOB, vascular congestion.   CXR showed bilateral pleural effusions and mild interstitial infiltrates (new).   Echo 3/20/22 showed LVEF <20%, grade II diastolic dysfunction, severe LAE, severe ELLY, mod MR, moderate TR, mod Pulm HTN  now on Lasix 80mg PO BID.  fluid removal with HD  Continue Metoprolol  start Entresto if BP remains stable (not started today because pt had 1st session of HD today)  Per patient he refused ICD in the past - per notes, he would be like to be reassessed after starting HD  continue telemetry    2. Ischemic cardiomyopathy   h/o CAD s/p CABG in 2000  EKG showed new LBBB, TWI II, III, aVF,   Troponin mildly elevated but stable 0.07  Nuclear stress test Jan 2022 showed  large in size severe in severity inferior, inferoseptal defect defect without reversibility.   Continue ASA, Metoprolol and Lipitor.  cardio: Dr. Grier    3. CKD5 - now on HD  s/p Right upper extremity AVF creation, not ready for use.   continue sevelamer, cinacalcet, calcitriol  s/p tunneled catheter by IR and started on HD today  renal f/u appreciated     4. Right sided chest pain: likely from recent Herpes zoster   He was treated with Acyclovir.   Continue tegretol 200mg daily.     5. DM   cont home insulin dosing: Lantus 10 qhs, lispro 3 tid and monitor FS (elevated today)    6. DVT ppx:  heparin SQ      Progress Note Handoff:  Pending (specify):  improving volume overload, HD per renal, start Entresto if BP remains stable    outpt HD setup    pt informed of the plan of care    Disposition: Home

## 2022-03-23 NOTE — PROGRESS NOTE ADULT - SUBJECTIVE AND OBJECTIVE BOX
WILBERT JULIAN 76y Male  MRN#: [id number]   Hospital Day: 4d    SUBJECTIVE  Patient is a 76y old Male  pmh significant for CKD5, CAD, CHF, HLD, HTN who presents with sob and lower extremity swelling.    Currently admitted to medicine with the primary diagnosis of CHF exacerbation      INTERVAL HPI AND OVERNIGHT EVENTS:  Patient was examined and seen at bedside. This morning he is resting comfortably in bed and reports no issues or overnight events.    REVIEW OF SYMPTOMS:  This morning he is still of feeling very weak and doesn't want to start dialysis today. Otherwise patient says postherpetic pain is better but still has the pain. Denies any CP, HA, SOB, Abdominal pain.     OBJECTIVE  PAST MEDICAL & SURGICAL HISTORY  DM (diabetes mellitus)    CAD (coronary artery disease)    CKD (chronic kidney disease)    HTN (hypertension)    HLD (hyperlipidemia)    CHF (congestive heart failure)      ALLERGIES:  No Known Allergies    MEDICATIONS:  STANDING MEDICATIONS  aspirin  chewable 81 milliGRAM(s) Oral daily  atorvastatin 10 milliGRAM(s) Oral at bedtime  calcitriol   Capsule 1 MICROGram(s) Oral daily  carBAMazepine ER Capsule 200 milliGRAM(s) Oral daily  chlorhexidine 4% Liquid 1 Application(s) Topical <User Schedule>  cinacalcet 30 milliGRAM(s) Oral daily  furosemide    Tablet 80 milliGRAM(s) Oral two times a day  heparin   Injectable 5000 Unit(s) SubCutaneous every 8 hours  influenza  Vaccine (HIGH DOSE) 0.7 milliLiter(s) IntraMuscular once  insulin glargine Injectable (LANTUS) 10 Unit(s) SubCutaneous at bedtime  insulin lispro (ADMELOG) corrective regimen sliding scale   SubCutaneous three times a day before meals  insulin lispro Injectable (ADMELOG) 3 Unit(s) SubCutaneous three times a day before meals  metoprolol succinate  milliGRAM(s) Oral daily  sevelamer carbonate 800 milliGRAM(s) Oral three times a day with meals  sodium bicarbonate 1300 milliGRAM(s) Oral three times a day    PRN MEDICATIONS  acetaminophen     Tablet .. 650 milliGRAM(s) Oral every 6 hours PRN  melatonin 5 milliGRAM(s) Oral at bedtime PRN  ondansetron Injectable 4 milliGRAM(s) IV Push every 8 hours PRN      VITAL SIGNS: Last 24 Hours  T(C): 36.2 (23 Mar 2022 05:00), Max: 36.3 (22 Mar 2022 12:15)  T(F): 97.2 (23 Mar 2022 05:00), Max: 97.4 (22 Mar 2022 12:15)  HR: 61 (23 Mar 2022 05:00) (61 - 67)  BP: 130/68 (23 Mar 2022 05:00) (130/68 - 142/69)  BP(mean): --  RR: 18 (23 Mar 2022 05:00) (16 - 18)  SpO2: 97% (22 Mar 2022 20:10) (97% - 97%)    LABS:                        11.8   5.02  )-----------( 219      ( 23 Mar 2022 06:00 )             35.8     03-23    137  |  96<L>  |  89<HH>  ----------------------------<  82  4.2   |  22  |  5.9<HH>    Ca    8.4<L>      23 Mar 2022 06:00  Phos  5.6     03-21  Mg     2.0     03-23    TPro  6.9  /  Alb  4.0  /  TBili  0.5  /  DBili  x   /  AST  64<H>  /  ALT  110<H>  /  AlkPhos  139<H>  03-23    PT/INR - ( 21 Mar 2022 16:00 )   PT: 14.00 sec;   INR: 1.22 ratio         PTT - ( 21 Mar 2022 14:09 )  PTT:34.0 sec    RADIOLOGY:      PHYSICAL EXAM:  CONSTITUTIONAL: No acute distress,  AAOx3  HEAD: Atraumatic, normocephalic  EYES: conjunctiva and sclera clear  ENT: No JVD  PULMONARY: Clear to auscultation bilaterally; no wheezes  CARDIOVASCULAR: Regular rate and rhythm; no murmurs, s1, s2  GASTROINTESTINAL: Soft, non-tender, non-distended; bowel sounds present  MUSCULOSKELETAL: no peripheral edema, no vesicular lesions noted on chest and back  NEUROLOGY: non-focal  SKIN: warm and dry     WILBERT JULIAN 76y Male  MRN#: [id number]   Hospital Day: 4d    SUBJECTIVE  Patient is a 76y old Male  pmh significant for CKD5, CAD, CHF, HLD, HTN who presents with sob and lower extremity swelling.    Currently admitted to medicine with the primary diagnosis of CHF exacerbation      INTERVAL HPI AND OVERNIGHT EVENTS:  Patient was examined and seen at bedside. This morning he is resting comfortably in bed and reports no issues or overnight events.    REVIEW OF SYMPTOMS:  This morning he is still of feeling very weak and doesn't want to start dialysis today unless he eats. Otherwise patient says postherpetic pain is better but still has the pain. Denies any CP, HA, SOB, Abdominal pain.     OBJECTIVE  PAST MEDICAL & SURGICAL HISTORY  DM (diabetes mellitus)    CAD (coronary artery disease)    CKD (chronic kidney disease)    HTN (hypertension)    HLD (hyperlipidemia)    CHF (congestive heart failure)      ALLERGIES:  No Known Allergies    MEDICATIONS:  STANDING MEDICATIONS  aspirin  chewable 81 milliGRAM(s) Oral daily  atorvastatin 10 milliGRAM(s) Oral at bedtime  calcitriol   Capsule 1 MICROGram(s) Oral daily  carBAMazepine ER Capsule 200 milliGRAM(s) Oral daily  chlorhexidine 4% Liquid 1 Application(s) Topical <User Schedule>  cinacalcet 30 milliGRAM(s) Oral daily  furosemide    Tablet 80 milliGRAM(s) Oral two times a day  heparin   Injectable 5000 Unit(s) SubCutaneous every 8 hours  influenza  Vaccine (HIGH DOSE) 0.7 milliLiter(s) IntraMuscular once  insulin glargine Injectable (LANTUS) 10 Unit(s) SubCutaneous at bedtime  insulin lispro (ADMELOG) corrective regimen sliding scale   SubCutaneous three times a day before meals  insulin lispro Injectable (ADMELOG) 3 Unit(s) SubCutaneous three times a day before meals  metoprolol succinate  milliGRAM(s) Oral daily  sevelamer carbonate 800 milliGRAM(s) Oral three times a day with meals  sodium bicarbonate 1300 milliGRAM(s) Oral three times a day    PRN MEDICATIONS  acetaminophen     Tablet .. 650 milliGRAM(s) Oral every 6 hours PRN  melatonin 5 milliGRAM(s) Oral at bedtime PRN  ondansetron Injectable 4 milliGRAM(s) IV Push every 8 hours PRN      VITAL SIGNS: Last 24 Hours  T(C): 36.2 (23 Mar 2022 05:00), Max: 36.3 (22 Mar 2022 12:15)  T(F): 97.2 (23 Mar 2022 05:00), Max: 97.4 (22 Mar 2022 12:15)  HR: 61 (23 Mar 2022 05:00) (61 - 67)  BP: 130/68 (23 Mar 2022 05:00) (130/68 - 142/69)  BP(mean): --  RR: 18 (23 Mar 2022 05:00) (16 - 18)  SpO2: 97% (22 Mar 2022 20:10) (97% - 97%)    LABS:                        11.8   5.02  )-----------( 219      ( 23 Mar 2022 06:00 )             35.8     03-23    137  |  96<L>  |  89<HH>  ----------------------------<  82  4.2   |  22  |  5.9<HH>    Ca    8.4<L>      23 Mar 2022 06:00  Phos  5.6     03-21  Mg     2.0     03-23    TPro  6.9  /  Alb  4.0  /  TBili  0.5  /  DBili  x   /  AST  64<H>  /  ALT  110<H>  /  AlkPhos  139<H>  03-23    PT/INR - ( 21 Mar 2022 16:00 )   PT: 14.00 sec;   INR: 1.22 ratio         PTT - ( 21 Mar 2022 14:09 )  PTT:34.0 sec    RADIOLOGY:      PHYSICAL EXAM:  CONSTITUTIONAL: No acute distress,  AAOx3  HEAD: Atraumatic, normocephalic  EYES: conjunctiva and sclera clear  ENT: No JVD, right IJ tunneled cath  PULMONARY: Clear to auscultation bilaterally; no wheezes  CARDIOVASCULAR: Regular rate and rhythm; no murmurs, s1, s2  GASTROINTESTINAL: Soft, non-tender, non-distended; bowel sounds present  MUSCULOSKELETAL: no peripheral edema, no vesicular lesions noted on chest and back  NEUROLOGY: non-focal  SKIN: warm and dry

## 2022-03-23 NOTE — PROGRESS NOTE ADULT - ASSESSMENT
CKD stage 5 not yet on HD  LUE AVF immature  s/p ACW TDC   acute on chronic HFrEF / fluid overload better  ischemic CMP / EF < 20% / CAD / CABG  metabolic acidosis  hyperphosphatemia / 2nd hyperparathyroidism   hyponatremia  R chest wall herpes zoster with pain  anemia  PVD  HTN  ex-smoker  prostate Ca s/p prostatectomy      plan:    HD today 1st run  outpatient HD arrangements for [facility]  cont lasix 80mg po bid  may start entresto 24/26 po bid  dc sodium bicarbonate as now on HD  cont renal diet / sevelamer  / calcitriol / cinacalcet   stable for dc home from [facility] once HD slot obtained         CKD stage 5 not yet on HD  LUE AVF immature  s/p ACW TDC   acute on chronic HFrEF / fluid overload better  ischemic CMP / EF < 20% / CAD / CABG  metabolic acidosis  hyperphosphatemia / 2nd hyperparathyroidism   hyponatremia  R chest wall herpes zoster with pain  anemia  PVD  HTN  ex-smoker  prostate Ca s/p prostatectomy      plan:    HD today 1st run  outpatient HD arrangements for [facility]  cont lasix 80mg po bid  may start entresto 24/26 po bid  dc sodium bicarbonate as now on HD  cont renal diet / sevelamer  / calcitriol / cinacalcet   stable for dc home from [facility] once HD slot obtained    addendum:  seen again on HD.  UF 0.5kg off, 1st run without adverse effect

## 2022-03-23 NOTE — PROGRESS NOTE ADULT - ASSESSMENT
Patient is a 76y old Male  pmh significant for CKD5, CAD, CHF, HLD, HTN who presents with sob and lower extremity swelling.    Currently admitted to medicine with the primary diagnosis of CHF exacerbation    #Dyspnea and LE swelling 2/2 volume overload from HFrEF and CKD5/ESRD   #Hyponatremia 2/2 volume overload   - BNP 46,457, CXR: small right pleural effusion/opacities  - Echo 3/19/21: EF 20-25%, G1DD, Mild MR, Mild AR, Mild RI  - repeat Echo: EF <20%, severely enlarged L+R atrium, moderate MR, TR, mild AS, moderate pulm HTN (50.8mmHg)  - patient refusing AICD  - not on ACEi d/t CKD, plan is for entresto once pt starts HD  - cont 80mg IV bid   - monitor Is and Os, daily weights   - cont metoprolol succinate 100 qd   - monitor labs BID    #CKD5/ESRD   #Elevated Troponin 2/2 CKD   - Right upper extremity immature AVF   - Trop 0.07 -> cont to trend  - cont sevelamer, cinacalcet, calcitriol, sodium bicarb as per nephro  - Nephro consult - Dr. Spencer   - avoid nephrotoxic agents   - tunneled cath planned today for HD  - Keep NPO for tunneled cath    #Post Herpetic Neuralgia   - Shingles treated 2 weeks ago with acyclovir   - start carbamazepine 200mg daily     #DM   - a1c 7.5  - cont home insulin dosing: Lantus 10 qhs, lispro 3 tid     #HTN   - controlled off meds     #HLD  - cont/w atorvastatin 10mg daily    #CAD   - cont aspirin 81mg  - c/w lipitor 10mg  - c/w  metoprolol 100mg daily    #Misc   Diet DASH  DVT ppx heparin   GI ppx none  Activity IAT   Dsipo: home pending tunneled cath today and start of HD     Patient is a 76y old Male  pmh significant for CKD5, CAD, CHF, HLD, HTN who presents with sob and lower extremity swelling.    Currently admitted to medicine with the primary diagnosis of CHF exacerbation    #Dyspnea and LE swelling 2/2 volume overload from HFrEF and CKD5/ESRD   #Hyponatremia 2/2 volume overload   - BNP 46,457, CXR: small right pleural effusion/opacities  - Echo 3/19/21: EF 20-25%, G1DD, Mild MR, Mild AR, Mild GA  - repeat Echo: EF <20%, severely enlarged L+R atrium, moderate MR, TR, mild AS, moderate pulm HTN (50.8mmHg)  - patient refusing AICD  - not on ACEi d/t CKD, plan is for entresto once pt starts HD  - cont 80mg IV bid   - monitor Is and Os, daily weights   - cont metoprolol succinate 100 qd   - monitor labs BID    #CKD5/ESRD   #Elevated Troponin 2/2 CKD   - Right upper extremity immature AVF   - Trop 0.07 -> cont to trend  - cont sevelamer, cinacalcet, calcitriol,   - DC sodium bicarb as per nephro  - Nephro consult - Dr. Spencer   - avoid nephrotoxic agents   - s/p right IJ tunneled cath   - 1st session of HD today    #Post Herpetic Neuralgia   - Shingles treated 2 weeks ago with acyclovir   - c/w carbamazepine 200mg daily     #DM   - a1c 7.5  - cont home insulin dosing: Lantus 10 qhs, lispro 3 tid     #HTN   - controlled off meds     #HLD  - cont/w atorvastatin 10mg daily    #CAD   - cont aspirin 81mg  - c/w lipitor 10mg  - c/w  metoprolol 100mg daily    #Misc   Diet DASH  DVT ppx heparin   GI ppx none  Activity IAT   Dsipo: home pending tunneled cath today and start of HD     Patient is a 76y old Male  pmh significant for CKD5, CAD, CHF, HLD, HTN who presents with sob and lower extremity swelling.    Currently admitted to medicine with the primary diagnosis of CHF exacerbation    #Dyspnea and LE swelling 2/2 volume overload from HFrEF and CKD5/ESRD   #Hyponatremia 2/2 volume overload   - BNP 46,457, CXR: small right pleural effusion/opacities  - Echo 3/19/21: EF 20-25%, G1DD, Mild MR, Mild AR, Mild [place]  - repeat Echo: EF <20%, severely enlarged L+R atrium, moderate MR, TR, mild AS, moderate pulm HTN (50.8mmHg)  - patient refusing AICD  - not on ACEi d/t CKD, plan is for entresto once pt starts HD  - cont 80mg IV bid   - monitor Is and Os, daily weights   - cont metoprolol succinate 100 qd   - monitor labs BID    #CKD5/ESRD   #Elevated Troponin 2/2 CKD   - Right upper extremity immature AVF   - Trop 0.07 -> cont to trend  - cont sevelamer, cinacalcet, calcitriol,   - DC sodium bicarb as per nephro  - Nephro consult - Dr. Spencer   - avoid nephrotoxic agents   - s/p right IJ tunneled cath   - 1st session of HD today    #Post Herpetic Neuralgia   - Shingles treated 2 weeks ago with acyclovir   - c/w carbamazepine 200mg daily     #DM   - a1c 7.5  - cont home insulin dosing: Lantus 10 qhs, lispro 3 tid     #HTN   - controlled off meds     #HLD  - cont/w atorvastatin 10mg daily    #CAD   - cont aspirin 81mg  - c/w lipitor 10mg  - c/w  metoprolol 100mg daily    #Misc   Diet DASH  DVT ppx heparin   GI ppx none  Activity IAT   Dsipo: home pending optimization after HD

## 2022-03-24 NOTE — PROGRESS NOTE ADULT - SUBJECTIVE AND OBJECTIVE BOX
CHIEF COMPLAINT:    Patient is a 76y old  Male who presents with a chief complaint of fluid overload     INTERVAL HPI/OVERNIGHT EVENTS:    Patient seen and examined at bedside. No acute overnight events occurred.    ROS: Denies SOB, chest pain. All other systems are negative.    Vital Signs:    T(F): 96.8 (22 @ 08:21), Max: 97.1 (22 @ 05:01)  HR: 70 (22 @ 08:21) (63 - 70)  BP: 131/70 (22 @ 08:21) (127/63 - 136/67)  RR: 16 (22 @ 08:21) (16 - 18)  SpO2: 97% (22 @ 08:21) (95% - 97%)  I&O's Summary    23 Mar 2022 07:01  -  24 Mar 2022 07:00  --------------------------------------------------------  IN: 510 mL / OUT: 2500 mL / NET: -1990 mL    24 Mar 2022 07:01  -  24 Mar 2022 13:41  --------------------------------------------------------  IN: 500 mL / OUT: 550 mL / NET: -50 mL      Daily     Daily Weight in k.5 (24 Mar 2022 05:01)  CAPILLARY BLOOD GLUCOSE      POCT Blood Glucose.: 133 mg/dL (24 Mar 2022 11:31)  POCT Blood Glucose.: 102 mg/dL (24 Mar 2022 08:02)  POCT Blood Glucose.: 158 mg/dL (23 Mar 2022 21:36)  POCT Blood Glucose.: 373 mg/dL (23 Mar 2022 16:39)      PHYSICAL EXAM:  GENERAL:  NAD  SKIN: No rashes or lesions  HEENT: Atraumatic. Normocephalic. Anicteric  NECK:  No JVD.   PULMONARY: Clear to ausculation bilaterally. No wheezing. No rales  CVS: Normal S1, S2. Regular rate and rhythm. No murmurs.  ABDOMEN/GI: Soft, Nontender, Nondistended; Bowel sounds are present  EXTREMITIES:  No edema B/L LE.  NEUROLOGIC:  No motor deficit.  PSYCH: Alert & oriented x 3, normal affect      LABS:                        11.1   4.55  )-----------( 162      ( 24 Mar 2022 04:30 )             34.3     03-24    138  |  98  |  71<HH>  ----------------------------<  111<H>  3.8   |  23  |  5.0<HH>    Ca    8.2<L>      24 Mar 2022 04:30  Mg     1.9     -    TPro  6.6  /  Alb  3.8  /  TBili  0.5  /  DBili  x   /  AST  68<H>  /  ALT  124<H>  /  AlkPhos  125<H>        Serum Pro-Brain Natriuretic Peptide: 43610 pg/mL (22 @ 10:22)      RADIOLOGY & ADDITIONAL TESTS:  Imaging or report Personally Reviewed:  [ ] YES  [ ] NO    Telemetry reviewed independently - no acute events    Medications:  Standing  aspirin  chewable 81 milliGRAM(s) Oral daily  atorvastatin 10 milliGRAM(s) Oral at bedtime  calcitriol   Capsule 1 MICROGram(s) Oral daily  carBAMazepine ER Capsule 200 milliGRAM(s) Oral daily  chlorhexidine 4% Liquid 1 Application(s) Topical <User Schedule>  cinacalcet 30 milliGRAM(s) Oral daily  furosemide    Tablet 80 milliGRAM(s) Oral two times a day  heparin   Injectable 5000 Unit(s) SubCutaneous every 8 hours  influenza  Vaccine (HIGH DOSE) 0.7 milliLiter(s) IntraMuscular once  insulin glargine Injectable (LANTUS) 10 Unit(s) SubCutaneous at bedtime  insulin lispro (ADMELOG) corrective regimen sliding scale   SubCutaneous three times a day before meals  insulin lispro Injectable (ADMELOG) 3 Unit(s) SubCutaneous three times a day before meals  metoprolol succinate  milliGRAM(s) Oral daily  potassium chloride   Powder 40 milliEquivalent(s) Oral once  sacubitril 24 mG/valsartan 26 mG 1 Tablet(s) Oral two times a day  sevelamer carbonate 800 milliGRAM(s) Oral three times a day with meals    PRN Meds  acetaminophen     Tablet .. 650 milliGRAM(s) Oral every 6 hours PRN  melatonin 5 milliGRAM(s) Oral at bedtime PRN  ondansetron Injectable 4 milliGRAM(s) IV Push every 8 hours PRN      Case discussed with resident  Care discussed with pt

## 2022-03-24 NOTE — PROGRESS NOTE ADULT - ASSESSMENT
Patient is a 76y old Male  pmh significant for CKD5, CAD, CHF, HLD, HTN who presents with sob and lower extremity swelling.    Currently admitted to medicine with the primary diagnosis of CHF exacerbation    #Dyspnea and LE swelling 2/2 volume overload from HFrEF and CKD5/ESRD   #Hyponatremia 2/2 volume overload   - BNP 46,457, CXR: small right pleural effusion/opacities  - Echo 3/19/21: EF 20-25%, G1DD, Mild MR, Mild AR, Mild [place]  - repeat Echo: EF <20%, severely enlarged L+R atrium, moderate MR, TR, mild AS, moderate pulm HTN (50.8mmHg)  - patient refusing AICD  - not on ACEi d/t CKD,  - start entresto 24/26 today   - cont 80mg IV bid   - monitor Is and Os, daily weights   - cont metoprolol succinate 100 qd   - monitor labs BID    #CKD5/ESRD   #Elevated Troponin 2/2 CKD   - Right upper extremity immature AVF   - Trop 0.07 -> cont to trend  - cont sevelamer, cinacalcet, calcitriol,   - DC sodium bicarb as per nephro  - Nephro consult - Dr. Spencer   - avoid nephrotoxic agents   - s/p right IJ tunneled cath 3/22  - s/p 1st session of HD 3/23    #Post Herpetic Neuralgia   - Shingles treated 2 weeks ago with acyclovir   - c/w carbamazepine 200mg daily     #DM   - a1c 7.5  - cont home insulin dosing: Lantus 10 qhs, lispro 3 tid     #HTN   - controlled off meds     #HLD  - cont/w atorvastatin 10mg daily    #CAD   - cont aspirin 81mg  - c/w lipitor 10mg  - c/w  metoprolol 100mg daily    #Misc   Diet DASH  DVT ppx heparin   GI ppx none  Activity IAT   Dsipo: home pending obs after starting entresto and outpatient HD set up

## 2022-03-24 NOTE — PROGRESS NOTE ADULT - ASSESSMENT
CKD stage 5 not yet on HD  LUE AVF immature  s/p ACW TDC   acute on chronic HFrEF / fluid overload better  ischemic CMP / EF < 20% / CAD / CABG  metabolic acidosis  hyperphosphatemia / 2nd hyperparathyroidism   hyponatremia  R chest wall herpes zoster with pain  anemia  PVD  HTN  ex-smoker  prostate Ca s/p prostatectomy      plan:    HD tomorrow  outpatient HD at [facility] 1st shift (OK to start Tuesday)  cont lasix 80mg po bid  cont entresto 24/26 po bid  cont renal diet / sevelamer  / calcitriol / cinacalcet   stable for dc home from [facility] after HD tomorrow

## 2022-03-24 NOTE — PROGRESS NOTE ADULT - SUBJECTIVE AND OBJECTIVE BOX
NEPHROLOGY FOLLOW UP NOTE    pt seen and examined   last HD yesterday  c/o zoster pain  no sob  no leg swelling    PAST MEDICAL & SURGICAL HISTORY:  DM (diabetes mellitus)    CAD (coronary artery disease)    CKD (chronic kidney disease)    HTN (hypertension)    HLD (hyperlipidemia)    CHF (congestive heart failure)      Allergies:  No Known Allergies    Home Medications Reviewed    SOCIAL HISTORY:  Denies ETOH,Smoking,   FAMILY HISTORY:        REVIEW OF SYSTEMS:  CONSTITUTIONAL: No weakness, fevers or chills  EYES/ENT: No visual changes;  No vertigo or throat pain   NECK: No pain or stiffness  RESPIRATORY: No cough, wheezing, hemoptysis; No shortness of breath  CARDIOVASCULAR: + chest pain or palpitations.  GASTROINTESTINAL: No abdominal or epigastric pain. No nausea, vomiting, or hematemesis; No diarrhea or constipation. No melena or hematochezia.  GENITOURINARY: No dysuria, frequency, foamy urine, urinary urgency, incontinence or hematuria  NEUROLOGICAL: No numbness or weakness  SKIN: No itching, burning, rashes, or lesions   VASCULAR: No bilateral lower extremity edema.   All other review of systems is negative unless indicated above.    PHYSICAL EXAM:  Constitutional: NAD  HEENT: anicteric sclera, oropharynx clear, MMM  Neck: No JVD  Respiratory: CTAB, no wheezes, rales or rhonchi  Cardiovascular: S1, S2, RRR  Gastrointestinal: BS+, soft, NT/ND  Extremities: No cyanosis or clubbing. No peripheral edema  Neurological: A/O x 3, no focal deficits  Psychiatric: Normal mood, normal affect  : No CVA tenderness. No franco.   Skin: No rashes  Vascular Access: AVF, ACW C    Hospital Medications:   MEDICATIONS  (STANDING):  aspirin  chewable 81 milliGRAM(s) Oral daily  atorvastatin 10 milliGRAM(s) Oral at bedtime  calcitriol   Capsule 1 MICROGram(s) Oral daily  carBAMazepine ER Capsule 200 milliGRAM(s) Oral daily  chlorhexidine 4% Liquid 1 Application(s) Topical <User Schedule>  cinacalcet 30 milliGRAM(s) Oral daily  furosemide    Tablet 80 milliGRAM(s) Oral two times a day  heparin   Injectable 5000 Unit(s) SubCutaneous every 8 hours  influenza  Vaccine (HIGH DOSE) 0.7 milliLiter(s) IntraMuscular once  insulin glargine Injectable (LANTUS) 10 Unit(s) SubCutaneous at bedtime  insulin lispro (ADMELOG) corrective regimen sliding scale   SubCutaneous three times a day before meals  insulin lispro Injectable (ADMELOG) 3 Unit(s) SubCutaneous three times a day before meals  metoprolol succinate  milliGRAM(s) Oral daily  sacubitril 24 mG/valsartan 26 mG 1 Tablet(s) Oral two times a day  sevelamer carbonate 800 milliGRAM(s) Oral three times a day with meals        VITALS:  T(F): 97.6 (03-24-22 @ 14:15), Max: 97.6 (03-24-22 @ 14:15)  HR: 74 (03-24-22 @ 14:15)  BP: 138/73 (03-24-22 @ 14:15)  RR: 16 (03-24-22 @ 14:15)  SpO2: 97% (03-24-22 @ 08:21)  Wt(kg): --    03-22 @ 07:01  -  03-23 @ 07:00  --------------------------------------------------------  IN: 360 mL / OUT: 1365 mL / NET: -1005 mL    03-23 @ 07:01  -  03-24 @ 07:00  --------------------------------------------------------  IN: 510 mL / OUT: 2500 mL / NET: -1990 mL    03-24 @ 07:01  -  03-24 @ 16:54  --------------------------------------------------------  IN: 500 mL / OUT: 550 mL / NET: -50 mL          LABS:  03-24    138  |  98  |  71<HH>  ----------------------------<  111<H>  3.8   |  23  |  5.0<HH>    Ca    8.2<L>      24 Mar 2022 04:30  Mg     1.9     03-24    TPro  6.6  /  Alb  3.8  /  TBili  0.5  /  DBili      /  AST  68<H>  /  ALT  124<H>  /  AlkPhos  125<H>  03-24                          11.1   4.55  )-----------( 162      ( 24 Mar 2022 04:30 )             34.3       Urine Studies:        RADIOLOGY & ADDITIONAL STUDIES:

## 2022-03-24 NOTE — PROGRESS NOTE ADULT - ASSESSMENT
75 yo M PMHx CKD V, CAD, chronic HFrEF, HLD and HTN presents with sob and lower extremity swelling. Pt states that he has been having lower extremity swelling and sob for the past couple of days.    Acute on chronic HFrEF  Ischemic cardiomyopathy  -Leg edema, SOB, vascular congestion.   -CXR showed bilateral pleural effusions and mild interstitial infiltrates (new).   -Echo 3/20/22 showed LVEF <20%, grade II diastolic dysfunction, severe LAE, severe ELLY, mod MR, moderate TR, mod Pulm HTN  -now on Lasix 80mg PO BID.  -fluid removal with HD  -Continue Metoprolol  -Entresto started today  -NSVT seen on monitor,  -h/o CAD s/p CABG in 2000  -EKG showed new LBBB, TWI II, III, aVF,   -Troponin mildly elevated but stable 0.07  -Nuclear stress test Jan 2022 showed  large in size severe in severity inferior, inferoseptal defect defect without reversibility.   -Continue ASA, Metoprolol and Lipitor.  cardio: Dr. Grier    CKD5 - now on HD  -s/p Right upper extremity AVF creation, not ready for use.   -continue sevelamer, cinacalcet, calcitriol  -s/p tunneled catheter by IR and started on HD today  -renal f/u appreciated     Right sided chest pain  - likely from recent Herpes zoster   -He was treated with Acyclovir.   -Continue tegretol 200mg daily.     -DM  II  cont home insulin dosing: Lantus 10 qhs, lispro 3 tid and monitor FS (elevated today)    6. DVT ppx:  heparin SQ      Progress Note Handoff:  Pending (specify):  improving volume overload, HD per renal, start Entresto if BP remains stable    outpt HD setup    pt informed of the plan of care    Disposition: Home   75 yo M PMHx CKD V, CAD, chronic HFrEF, HLD and HTN presents with sob and lower extremity swelling. Pt states that he has been having lower extremity swelling and sob for the past couple of days.    Acute on chronic HFrEF  Ischemic cardiomyopathy  -Leg edema, SOB, vascular congestion.   -CXR showed bilateral pleural effusions and mild interstitial infiltrates (new).   -Echo 3/20/22 showed LVEF <20%, grade II diastolic dysfunction, severe LAE, severe ELLY, mod MR, moderate TR, mod Pulm HTN  -now on Lasix 80mg PO BID.  -fluid removal with HD  -Continue Metoprolol  -Entresto started today  -NSVT seen on monitor,  -h/o CAD s/p CABG in 2000  -EKG showed new LBBB, TWI II, III, aVF,   -Troponin mildly elevated but stable 0.07  -Nuclear stress test Jan 2022 showed  large in size severe in severity inferior, inferoseptal defect defect without reversibility.   -Continue ASA, Metoprolol and Lipitor.  cardio: Dr. Grier    CKD5 - now on HD  -s/p Right upper extremity AVF creation, not ready for use.   -continue sevelamer, cinacalcet, calcitriol  -s/p tunneled catheter by IR and started on HD today  -renal f/u appreciated     Right sided chest pain  - likely from recent Herpes zoster   -He was treated with Acyclovir.   -Continue tegretol 200mg daily.     -DM  II  cont home insulin dosing: Lantus 10 qhs, lispro 3 tid and monitor FS (elevated today)    DVT ppx:  heparin SQ      Progress Note Handoff:  Pending (specify):  improving volume overload, HD per renal, start Entresto if BP remains stable    outpt HD setup    pt informed of the plan of care    Disposition: Home

## 2022-03-24 NOTE — PROGRESS NOTE ADULT - SUBJECTIVE AND OBJECTIVE BOX
WILBERT JULIAN 76y Male  MRN#: [id number]   Hospital Day: 5d    SUBJECTIVE  Patient is a 76y old Male  pmh significant for CKD5, CAD, CHF, HLD, HTN who presents with sob and lower extremity swelling.    Currently admitted to medicine with the primary diagnosis of CHF exacerbation      INTERVAL HPI AND OVERNIGHT EVENTS:  Patient was examined and seen at bedside. This morning he is resting comfortably in bed and reports no issues or overnight events.    REVIEW OF SYMPTOMS:  This morning he is feeling okay. Admits to still having residual post herpetic pain. Otherwise denies any CP, HA, SOB, Abdominal pain.       OBJECTIVE  PAST MEDICAL & SURGICAL HISTORY  DM (diabetes mellitus)    CAD (coronary artery disease)    CKD (chronic kidney disease)    HTN (hypertension)    HLD (hyperlipidemia)    CHF (congestive heart failure)      ALLERGIES:  No Known Allergies    MEDICATIONS:  STANDING MEDICATIONS  aspirin  chewable 81 milliGRAM(s) Oral daily  atorvastatin 10 milliGRAM(s) Oral at bedtime  calcitriol   Capsule 1 MICROGram(s) Oral daily  carBAMazepine ER Capsule 200 milliGRAM(s) Oral daily  chlorhexidine 4% Liquid 1 Application(s) Topical <User Schedule>  cinacalcet 30 milliGRAM(s) Oral daily  furosemide    Tablet 80 milliGRAM(s) Oral two times a day  heparin   Injectable 5000 Unit(s) SubCutaneous every 8 hours  influenza  Vaccine (HIGH DOSE) 0.7 milliLiter(s) IntraMuscular once  insulin glargine Injectable (LANTUS) 10 Unit(s) SubCutaneous at bedtime  insulin lispro (ADMELOG) corrective regimen sliding scale   SubCutaneous three times a day before meals  insulin lispro Injectable (ADMELOG) 3 Unit(s) SubCutaneous three times a day before meals  magnesium sulfate  IVPB 2 Gram(s) IV Intermittent once  metoprolol succinate  milliGRAM(s) Oral daily  potassium chloride   Powder 40 milliEquivalent(s) Oral once  sacubitril 24 mG/valsartan 26 mG 1 Tablet(s) Oral two times a day  sevelamer carbonate 800 milliGRAM(s) Oral three times a day with meals    PRN MEDICATIONS  acetaminophen     Tablet .. 650 milliGRAM(s) Oral every 6 hours PRN  melatonin 5 milliGRAM(s) Oral at bedtime PRN  ondansetron Injectable 4 milliGRAM(s) IV Push every 8 hours PRN      VITAL SIGNS: Last 24 Hours  T(C): 36 (24 Mar 2022 08:21), Max: 36.2 (24 Mar 2022 05:01)  T(F): 96.8 (24 Mar 2022 08:21), Max: 97.1 (24 Mar 2022 05:01)  HR: 70 (24 Mar 2022 08:21) (63 - 70)  BP: 131/70 (24 Mar 2022 08:21) (116/55 - 136/67)  BP(mean): 94 (24 Mar 2022 08:21) (94 - 94)  RR: 16 (24 Mar 2022 08:21) (16 - 18)  SpO2: 97% (24 Mar 2022 08:21) (95% - 97%)    LABS:                        11.1   4.55  )-----------( 162      ( 24 Mar 2022 04:30 )             34.3     03-24    138  |  98  |  71<HH>  ----------------------------<  111<H>  3.8   |  23  |  5.0<HH>    Ca    8.2<L>      24 Mar 2022 04:30  Mg     1.9     03-24    TPro  6.6  /  Alb  3.8  /  TBili  0.5  /  DBili  x   /  AST  68<H>  /  ALT  124<H>  /  AlkPhos  125<H>  03-24    RADIOLOGY:      PHYSICAL EXAM:  CONSTITUTIONAL: No acute distress,  AAOx3  HEAD: Atraumatic, normocephalic  EYES: conjunctiva and sclera clear  ENT: No JVD, right IJ tunneled cath  PULMONARY: Clear to auscultation bilaterally; no wheezes  CARDIOVASCULAR: Regular rate and rhythm; no murmurs, s1, s2  GASTROINTESTINAL: Soft, non-tender, non-distended; bowel sounds present  MUSCULOSKELETAL: no peripheral edema, no vesicular lesions noted on chest and back  NEUROLOGY: non-focal  SKIN: warm and dry

## 2022-03-25 NOTE — DISCHARGE NOTE PROVIDER - NSDCMRMEDTOKEN_GEN_ALL_CORE_FT
aspirin 81 mg oral tablet, chewable: 1 tab(s) orally once a day  atorvastatin 10 mg oral tablet: 1 tab(s) orally once a day (at bedtime)  calcitriol 0.5 mcg oral capsule: 2 cap(s) orally once a day  cinacalcet 30 mg oral tablet: 1 tab(s) orally once a day  Entresto 24 mg-26 mg oral tablet: 1 tab(s) orally once a day   furosemide 80 mg oral tablet: 1 tab(s) orally 2 times a day  Insulin Aspart FlexPen 100 units/mL injectable solution: 3 unit(s) injectable 3 times a day  NovoLOG FlexPen 100 units/mL injectable solution: 10 unit(s) injectable once a day (at bedtime)  Procrit 20,000 units/mL injectable solution: 1 milliliter(s) injectable every 2 weeks  Renvela 800 mg oral tablet: 1 tab(s) orally 3 times a day (with meals)  sodium bicarbonate 650 mg oral tablet: 2 tab(s) orally 3 times a day  Toprol- mg oral tablet, extended release: 1 tab(s) orally once a day   aspirin 81 mg oral tablet, chewable: 1 tab(s) orally once a day  atorvastatin 10 mg oral tablet: 1 tab(s) orally once a day (at bedtime)  calcitriol 0.5 mcg oral capsule: 2 cap(s) orally once a day  cinacalcet 30 mg oral tablet: 1 tab(s) orally once a day  Entresto 24 mg-26 mg oral tablet: 1 tab(s) orally once a day   furosemide 80 mg oral tablet: 1 tab(s) orally 2 times a day  Insulin Aspart FlexPen 100 units/mL injectable solution: 3 unit(s) injectable 3 times a day  NovoLOG FlexPen 100 units/mL injectable solution: 10 unit(s) injectable once a day (at bedtime)  Procrit 20,000 units/mL injectable solution: 1 milliliter(s) injectable every 2 weeks  Renvela 800 mg oral tablet: 1 tab(s) orally 3 times a day (with meals)  Rocaltrol 0.5 mcg oral capsule: 2 cap(s) orally once a day  sodium bicarbonate 650 mg oral tablet: 2 tab(s) orally 3 times a day  Toprol- mg oral tablet, extended release: 1 tab(s) orally once a day   aspirin 81 mg oral tablet, chewable: 1 tab(s) orally once a day  atorvastatin 10 mg oral tablet: 1 tab(s) orally once a day (at bedtime)  carBAMazepine 200 mg oral capsule, extended release: 1 cap(s) orally once a day  cinacalcet 30 mg oral tablet: 1 tab(s) orally once a day  Entresto 24 mg-26 mg oral tablet: 1 tab(s) orally once a day   furosemide 80 mg oral tablet: 1 tab(s) orally 2 times a day  Insulin Aspart FlexPen 100 units/mL injectable solution: 3 unit(s) injectable 3 times a day  Procrit 20,000 units/mL injectable solution: 1 milliliter(s) injectable every 2 weeks  Renvela 800 mg oral tablet: 1 tab(s) orally 3 times a day (with meals)  Rocaltrol 0.5 mcg oral capsule: 2 cap(s) orally once a day  sodium bicarbonate 650 mg oral tablet: 2 tab(s) orally 3 times a day  Toprol- mg oral tablet, extended release: 1 tab(s) orally once a day   aspirin 81 mg oral tablet, chewable: 1 tab(s) orally once a day  atorvastatin 10 mg oral tablet: 1 tab(s) orally once a day (at bedtime)  carBAMazepine 200 mg oral capsule, extended release: 1 cap(s) orally once a day  cinacalcet 30 mg oral tablet: 1 tab(s) orally once a day  Entresto 24 mg-26 mg oral tablet: 1 tab(s) orally once a day   furosemide 80 mg oral tablet: 1 tab(s) orally 2 times a day  Insulin Aspart FlexPen 100 units/mL injectable solution: 3 unit(s) injectable 3 times a day  Lantus 100 units/mL subcutaneous solution: 10 unit(s) subcutaneous once a day (at bedtime)  Procrit 20,000 units/mL injectable solution: 1 milliliter(s) injectable every 2 weeks  Renvela 800 mg oral tablet: 1 tab(s) orally 3 times a day (with meals)  Rocaltrol 0.5 mcg oral capsule: 2 cap(s) orally once a day  sodium bicarbonate 650 mg oral tablet: 2 tab(s) orally 3 times a day  Toprol- mg oral tablet, extended release: 1 tab(s) orally once a day

## 2022-03-25 NOTE — DISCHARGE NOTE PROVIDER - NSDCCPCAREPLAN_GEN_ALL_CORE_FT
PRINCIPAL DISCHARGE DIAGNOSIS  Diagnosis: Acute exacerbation of CHF (congestive heart failure)  Assessment and Plan of Treatment: You presented to the hospital for shortness of breath, while in the hospital you were found to be retaining fluids. You were diuresed with a water pill. It was found that your heard function was further decreased. You were started on a new heart failure medication. You refused the inplantable device recommended by the Cardiologist. Please follow up with your cardiologist, nephrologist and primary care provider      SECONDARY DISCHARGE DIAGNOSES  Diagnosis: ESRD on dialysis  Assessment and Plan of Treatment: Because of your decreased kidney function, you were started on dialysis in patient. You were started on dialysis. Please continue dialysis outpatient. Your first session will be on Tuesday at [facility] at 5:45am. You will continue every Tuesday, Thursday and Saturday.  Please follow up with your nephrologist.

## 2022-03-25 NOTE — DISCHARGE NOTE NURSING/CASE MANAGEMENT/SOCIAL WORK - NSDCPEFALRISK_GEN_ALL_CORE
For information on Fall & Injury Prevention, visit: [email or web address] OR  [email or web address] OR  https://www.cdc.gov/steadi/patient.html

## 2022-03-25 NOTE — PROGRESS NOTE ADULT - REASON FOR ADMISSION
fluid overload

## 2022-03-25 NOTE — DISCHARGE NOTE PROVIDER - PROVIDER TOKENS
Alert-The patient is alert, awake and responds to voice. The patient is oriented to time, place, and person. The triage nurse is able to obtain subjective information. PROVIDER:[TOKEN:[[id number]],FOLLOWUP:[2 weeks],ESTABLISHEDPATIENT:[T]],PROVIDER:[TOKEN:[[id number]],FOLLOWUP:[2 weeks],ESTABLISHEDPATIENT:[T]]

## 2022-03-25 NOTE — PROGRESS NOTE ADULT - PROVIDER SPECIALTY LIST ADULT
Hospitalist
Internal Medicine
Intervent Radiology
Hospitalist
Hospitalist
Internal Medicine
Nephrology
Hospitalist
Internal Medicine
Nephrology
Nephrology

## 2022-03-25 NOTE — PROGRESS NOTE ADULT - ASSESSMENT
CKD stage 5 not yet on HD  LUE AVF immature  s/p ACW TDC   resolved acute on chronic HFrEF /   ischemic CMP / EF < 20% / CAD / CABG  hyperphosphatemia / 2nd hyperparathyroidism   hyponatremia  R chest wall herpes zoster with pain  anemia  PVD  HTN  ex-smoker  prostate Ca s/p prostatectomy      plan:    HD today  outpatient HD at [facility] 1st shift (OK to start Tuesday)  cont lasix 80mg po bid  cont entresto 24/26 po bid  cont renal diet / sevelamer  / calcitriol / cinacalcet   stable for dc home from renal perspective after HD today

## 2022-03-25 NOTE — DISCHARGE NOTE NURSING/CASE MANAGEMENT/SOCIAL WORK - PATIENT PORTAL LINK FT
You can access the FollowMyHealth Patient Portal offered by [facility] by registering at the following website: [email or web address]. By joining [employer]’s FollowMyHealth portal, you will also be able to view your health information using other applications (apps) compatible with our system.

## 2022-03-25 NOTE — CHART NOTE - NSCHARTNOTEFT_GEN_A_CORE
Pt seen and examined at bedside. Patient reports feeling well, tolerated HD today. Discharge medications reviewed with pt and daughter. I spoke with pt regarding AICD again. He is aware of risk of sudden cardiac death. I offered to speak with his daughter about it but he declined. Medically stable for dc home.    PHYSICAL EXAM:  GENERAL: NAD, speaks in full sentences, no signs of respiratory distress  HEAD:  Atraumatic, Normocephalic  EYES: EOMI, PERRLA, conjunctiva and sclera clear  NECK: Supple, No JVD  CHEST/LUNG: Clear to auscultation bilaterally; No wheeze; No crackles; No accessory muscles used  HEART: Regular rate and rhythm; No murmurs;   ABDOMEN: Soft, Nontender, Nondistended; Bowel sounds present; No guarding  EXTREMITIES:  2+ Peripheral Pulses, No cyanosis or edema  PSYCH: AAOx3  NEUROLOGY: non-focal  SKIN: No rashes or lesions    Time spent coordinating discharge 33 minutes
ANESTHESIA to PACU NOTE      ____ Intubated  TV:______       Rate: ______      FiO2: ______    _x___ Patent Airway    _x___ Full return of protective reflexes    ____ Full recovery from anesthesia / sedation to baseline status    Vitals:  HR 63  /70  RR 15  O2sat. 100%  Temp: 36.3C      Mental Status:  __x__ Awake   __x___ Alert   _____ Drowsy   _____ Sedated    Nausea/Vomiting: ____ Yes, See Post - Op Orders      __x__ No    Pain Scale (0-10): _____    Treatment: _x___ None    ____ See Post - Op/PCA Orders    Post - Operative Fluids:   ____ Oral   __x__ See Post - Op Orders    Plan:  Discharge to:   ____Home       _x____Floor      _____Critical Care    _____ Other:_________________    Comments: s/p IV sedation. No anesthesia complications. Pt's condition is stable in PACU. Full report is given to PACU RN.

## 2022-03-25 NOTE — DISCHARGE NOTE PROVIDER - CARE PROVIDER_API CALL
Patient:   KINGSTON JAMES            [facility]: [facility][id number]            FIN: [id number]              [facility] to [facility] , [facility] and Attending  Kiana Serrano [facility]  D/w [facility] who discussed with attending  Wilian Casas  Internal medicine PGY 2  Please page through perfectserve   Dez Grier)  Cardiovascular Disease; Nuclear Cardiology  [address] [place] [address], [address]  [place] [address]  Phone: [phone]  Fax: [phone]  Established Patient  Follow Up Time: 2 weeks    Clarke Spencer)  Internal Medicine; Nephrology  [address] [place] [address]  Phone: [phone]  Fax: [phone]  Established Patient  Follow Up Time: 2 weeks

## 2022-03-25 NOTE — PROGRESS NOTE ADULT - SUBJECTIVE AND OBJECTIVE BOX
NEPHROLOGY FOLLOW UP NOTE    pt seen and examined   still with c/o zoster pain  no sob  no leg swelling  for HD today    PAST MEDICAL & SURGICAL HISTORY:  DM (diabetes mellitus)    CAD (coronary artery disease)    CKD (chronic kidney disease)    HTN (hypertension)    HLD (hyperlipidemia)    CHF (congestive heart failure)      Allergies:  No Known Allergies    Home Medications Reviewed    SOCIAL HISTORY:  Denies ETOH,Smoking,   FAMILY HISTORY:        REVIEW OF SYSTEMS:  CONSTITUTIONAL: No weakness, fevers or chills  EYES/ENT: No visual changes;  No vertigo or throat pain   NECK: No pain or stiffness  RESPIRATORY: No cough, wheezing, hemoptysis; No shortness of breath  CARDIOVASCULAR: + chest pain or palpitations.  GASTROINTESTINAL: No abdominal or epigastric pain. No nausea, vomiting, or hematemesis; No diarrhea or constipation. No melena or hematochezia.  GENITOURINARY: No dysuria, frequency, foamy urine, urinary urgency, incontinence or hematuria  NEUROLOGICAL: No numbness or weakness  SKIN: No itching, burning, rashes, or lesions   VASCULAR: No bilateral lower extremity edema.   All other review of systems is negative unless indicated above.    PHYSICAL EXAM:  Constitutional: NAD  HEENT: anicteric sclera, oropharynx clear, MMM  Neck: No JVD  Respiratory: CTAB, no wheezes, rales or rhonchi  Cardiovascular: S1, S2, RRR  Gastrointestinal: BS+, soft, NT/ND  Extremities: No cyanosis or clubbing. No peripheral edema  Neurological: A/O x 3, no focal deficits  Psychiatric: Normal mood, normal affect  : No CVA tenderness. No franco.   Skin: No rashes  Vascular Access: AVF, ACW TDC    Hospital Medications:   MEDICATIONS  (STANDING):  aspirin  chewable 81 milliGRAM(s) Oral daily  atorvastatin 10 milliGRAM(s) Oral at bedtime  calcitriol   Capsule 1 MICROGram(s) Oral daily  carBAMazepine ER Capsule 200 milliGRAM(s) Oral daily  chlorhexidine 4% Liquid 1 Application(s) Topical <User Schedule>  cinacalcet 30 milliGRAM(s) Oral daily  epoetin nandini-epbx (RETACRIT) Injectable 97905 Unit(s) IV Push once  furosemide    Tablet 80 milliGRAM(s) Oral two times a day  heparin   Injectable 5000 Unit(s) SubCutaneous every 8 hours  influenza  Vaccine (HIGH DOSE) 0.7 milliLiter(s) IntraMuscular once  insulin glargine Injectable (LANTUS) 10 Unit(s) SubCutaneous at bedtime  insulin lispro (ADMELOG) corrective regimen sliding scale   SubCutaneous three times a day before meals  insulin lispro Injectable (ADMELOG) 3 Unit(s) SubCutaneous three times a day before meals  iron sucrose Injectable 100 milliGRAM(s) IV Push once  metoprolol succinate  milliGRAM(s) Oral daily  sacubitril 24 mG/valsartan 26 mG 1 Tablet(s) Oral two times a day  sevelamer carbonate 800 milliGRAM(s) Oral three times a day with meals        VITALS:  T(F): 97.6 (03-25-22 @ 04:49), Max: 97.8 (03-24-22 @ 20:27)  HR: 67 (03-25-22 @ 10:55)  BP: 109/62 (03-25-22 @ 10:55)  RR: 18 (03-25-22 @ 10:55)  SpO2: 97% (03-25-22 @ 04:49)  Wt(kg): --    03-23 @ 07:01  -  03-24 @ 07:00  --------------------------------------------------------  IN: 510 mL / OUT: 2500 mL / NET: -1990 mL    03-24 @ 07:01  -  03-25 @ 07:00  --------------------------------------------------------  IN: 500 mL / OUT: 1450 mL / NET: -950 mL    03-25 @ 07:01  -  03-25 @ 12:08  --------------------------------------------------------  IN: 446 mL / OUT: 575 mL / NET: -129 mL          LABS:  03-25    134<L>  |  98  |  73<HH>  ----------------------------<  112<H>  4.6   |  20  |  5.3<HH>    Ca    8.3<L>      25 Mar 2022 04:30  Mg     2.4     03-25    TPro  6.9  /  Alb  3.9  /  TBili  0.5  /  DBili      /  AST  37  /  ALT  96<H>  /  AlkPhos  131<H>  03-25                          11.6   5.74  )-----------( 179      ( 25 Mar 2022 04:30 )             35.9       Urine Studies:        RADIOLOGY & ADDITIONAL STUDIES:

## 2022-03-25 NOTE — DISCHARGE NOTE PROVIDER - HOSPITAL COURSE
76 yr old m w/ a pmh significant for CKD5, CAD, CHF, HLD, HTN who presents with sob and lower extremity swelling. Pt states that he has been having lower extremity pain and sob for the past couple of days. Pt was told by nephrologist to come to the hospital for admission. Of note, pt did have placement of AV fistula for future dialysis but is not mature next. Patient also was complaining of right sided chest pain and right sided back pain from an episode of shingles. The lesions are crusted over, but the pain still remains. Patient denies fever, chills, cough, abdominal pain, vomiting, constipation and diarrhea.     In ED vitals: /74, HR 58, RR 18, SpO2 97% on RA Patient got one dose of IV lasix in ED.     While in the hospital patient was found to be fluid overloaded. He was diuresed and eventually was started on dialysis s/p tunneled cath by IR. Because of decreased systolic dfx, patient was seen by cardiology. Patient still refusing  AICD for now. He was also started on Entresto while in the hospital. Course complicated by post herpectic neuralgia for which carbamazepine was started  Patient is stable and ready for DC with op HD set up at [facility]. Will f/u op with cards, nephro and pcp.

## 2022-03-28 PROBLEM — I50.9 ACUTE CONGESTIVE HEART FAILURE, UNSPECIFIED HEART FAILURE TYPE: Status: ACTIVE | Noted: 2022-01-01

## 2022-03-28 NOTE — PLAN
[FreeTextEntry1] : CHF- low salt diet , daily weights, c/w Entresto, diuretics , cardiology follow up , TCM RN contacted cardio to schedule, will notify patient\par HTN- c/w current medications\par ESRD- continued nephrology followup , dialysis as scheduled\par NP ordered HCS as patient can benefit from skilled nursing via TCM bridge program, will follow up with home visit according to dialysis schedule

## 2022-03-28 NOTE — HISTORY OF PRESENT ILLNESS
[Home] : at home, [unfilled] , at the time of the visit. [Other Location: e.g. Home (Enter Location, City,State)___] : at [unfilled] [Family Member] : family member [Verbal consent obtained from patient] : the patient, [unfilled] [FreeTextEntry1] : follow up CHF  [de-identified] : Patient is a 75 y/o male enrolled in the TCM STARS program with a PMH of HTN, HFrEF , ESRD (tues, thurs , sat) and HTN recently discharge from [facility] for CHF exacerbation, patient was diuresed placed on Entresto and dc home w/o HCS. Patient observed via tele health alert in NAD denies c/p, sob, le swelling , c/o fatigue and admits to non compliance with daily weights. Dialysis scheduled for tomorrow. \par Hospital course from [facility] : 76 yr old m w/ a pmh significant for CKD5, CAD, CHF, HLD, HTN who presents with sob and lower extremity swelling. Pt states that he has been having lower extremity pain and sob for the past couple of days. Pt was told by nephrologist to come to the hospital for admission. Of note, pt did have placement of AV fistula for future dialysis but is not mature next. Patient also was complaining of right sided chest pain and right sided back pain from an episode of shingles. The lesions are crusted over, but the pain still remains. Patient denies fever, chills, cough, abdominal pain, vomiting, constipation and diarrhea. \par In ED vitals: /74, HR 58, RR 18, SpO2 97% on RA Patient got one dose of IV lasix in ED. \par While in the hospital patient was found to be fluid overloaded. He was diuresed and eventually was started on dialysis s/p tunneled cath by IR. Because of decreased systolic dfx, patient was seen by cardiology. Patient still refusing AICD for now. He was also started on Entresto while in the hospital. Course complicated by post herpectic neuralgia for which carbamazepine was started\par Patient is stable and ready for DC with op HD set up at [facility]. Will f/u op with cards, nephro and pcp.

## 2022-03-28 NOTE — ASSESSMENT
[FreeTextEntry1] : Patient is a 75 y/o male enrolled in the TCM STARS program with a PMH of HTN, HFrEF , ESRD (tues, thurs , sat) and HTN recently discharge from [facility] for CHF exacerbation, patient was diuresed placed on Entresto and dc home w/o HCS. Patient observed via tele health alert in NAD denies c/p, sob, le swelling , c/o fatigue and admits to non compliance with daily weights. Dialysis scheduled for tomorrow.

## 2022-03-28 NOTE — COUNSELING
[de-identified] : Pt was informed about CN’s role/ STARS program and overview of transitional care reviewed with patient. In addition, yellow contact card was provided. Pt educated on topics of importance such as compliance with all provider visits, prescribed medication regimen, and low salt diet. Pt encouraged calling CN with any issues, concerns or questions, also educated to notify CN if experiencing CP, SOB , cough, increased mucus/phlegm production, abdominal discomfort/swelling, difficulty sleeping or lying flat, fever, chills, fatigue, weight gain of 2-3lbs in 24 hours or 5lbs in one week,  dizziness, lightheadedness, n/v/d/c, swelling to extremities and/or any signs of CHF exacerbation as reviewed. Reassurance provided. Will continue to monitor\par \par

## 2022-03-28 NOTE — PHYSICAL EXAM
[No Acute Distress] : no acute distress [No Respiratory Distress] : no respiratory distress  [No Accessory Muscle Use] : no accessory muscle use [No Edema] : there was no peripheral edema [Normal Affect] : the affect was normal [Alert and Oriented x3] : oriented to person, place, and time [Normal Insight/Judgement] : insight and judgment were intact

## 2022-03-30 PROBLEM — I25.5 ISCHEMIC CARDIOMYOPATHY: Status: ACTIVE | Noted: 2022-01-01

## 2022-03-30 PROBLEM — I10 BENIGN ESSENTIAL HYPERTENSION: Status: ACTIVE | Noted: 2022-01-01

## 2022-03-30 PROBLEM — I70.213 ATHEROSCLEROSIS OF BOTH LOWER EXTREMITIES WITH INTERMITTENT CLAUDICATION: Status: ACTIVE | Noted: 2022-01-01

## 2022-03-30 PROBLEM — Z00.00 ENCOUNTER FOR PREVENTIVE HEALTH EXAMINATION: Status: ACTIVE | Noted: 2022-01-01

## 2022-03-30 PROBLEM — N18.4 CKD (CHRONIC KIDNEY DISEASE), STAGE IV: Status: RESOLVED | Noted: 2022-01-01 | Resolved: 2022-01-01

## 2022-03-30 PROBLEM — N18.6 ESRD ON DIALYSIS: Status: ACTIVE | Noted: 2022-01-01

## 2022-03-30 NOTE — ASSESSMENT
[FreeTextEntry1] : 76-yo male with h/o remote MI, CABG. \par Ischemic cardiomyopathy, severe LV dysfunction. No ischemia.\par HTN.\par Hyperlipidemia.\par CKD 5. HD started.\par \par Recommend:\par Continue treatment.\par ICD discussed with patient and son again. They still want to think about it.\par F/u in 3 months.\par \par Dez Grier MD\par

## 2022-03-30 NOTE — HISTORY OF PRESENT ILLNESS
[FreeTextEntry1] : 76-yo male with h/o CAD, MI and 2-vessel CABG in 2000. Ischemic cardiomyopathy, LVEF 22%.\par \par CKD 5, HD recently started. Fluid overload has resolved since then. Patient denies CP, SOB, c/o fatigue.

## 2022-04-05 NOTE — DISCHARGE NOTE PROVIDER - NSDCHOSPICE_GEN_A_CORE
----- Message from Adam Kenney MD sent at 4/4/2022  7:11 PM CDT -----  Can you let her know that the kidney function has improved significantly. Recommend to continue with same level of oral hydration.   Thank you.   Adam Kenney MD   
Left detailed message regarding below.  
No

## 2022-04-06 PROBLEM — M79.89 SWELLING OF LOWER EXTREMITY: Status: ACTIVE | Noted: 2022-01-01

## 2022-04-06 NOTE — PLAN
[FreeTextEntry1] : 75 yo M presenting with weight gain and fluid overload. NAD, no SOB/HANSEN/CP. Recommend waiting for dialysis tomorrow morning (5:45 am). Discussed s/sx to watch for and reasons to go to ER

## 2022-04-06 NOTE — HISTORY OF PRESENT ILLNESS
[Home] : at home, [unfilled] , at the time of the visit. [Other Location: e.g. Home (Enter Location, City,State)___] : at [unfilled] [Verbal consent obtained from patient] : the patient, [unfilled] [FreeTextEntry8] : 77 yo M hx CHF, CKD 5 recently started on HD, last HD Tuesday, presenting for evaluation of b/l LE swelling. Pt states he did not get full amount of fluid off on Tuesday because he became hypotensive. Denies cp, sob, palpitations or HANSEN. Took full dose of lasix today but with minimal urine output.

## 2022-04-06 NOTE — PHYSICAL EXAM
[No Acute Distress] : no acute distress [Well Nourished] : well nourished [Normal Sclera/Conjunctiva] : normal sclera/conjunctiva [EOMI] : extraocular movements intact [Normal Oropharynx] : the oropharynx was normal [No JVD] : no jugular venous distention [Supple] : supple [No Respiratory Distress] : no respiratory distress  [Grossly Normal Strength/Tone] : grossly normal strength/tone [No Focal Deficits] : no focal deficits [Normal Affect] : the affect was normal [de-identified] : 1+ b/l pitting edema to mid calf

## 2022-04-06 NOTE — REVIEW OF SYSTEMS
[Fever] : no fever [Chills] : no chills [Chest Pain] : no chest pain [Palpitations] : no palpitations [Lower Ext Edema] : lower extremity edema [Orthopena] : no orthopnea [Shortness Of Breath] : no shortness of breath [Cough] : no cough

## 2022-05-16 NOTE — CONSULT NOTE ADULT - SUBJECTIVE AND OBJECTIVE BOX
HPI:  76yM pmhx ESRD on HD, CAD s/p CABG, Ischemic cardiomyopathy, HFrEF (EF <20%), HLD, HTN BIBEMS after being found unresponsive in hemodialysis (per EMS pt received most of treatment); no pulse so CPR initiated, found to be in vfib, received 4x epi and amio 300mg, down time in field 20min. ROSC achieved as pt was being wheeled into ED. Pt lost pulses shortly after so CPR reinitiated. Pt intubated in field and tube confirmed via direct laryngoscopy. Epi given per ACLS protocol, calcium chloride 1g x3, bicarb, amio given; persistent vfib during pulse checks. A-line and central line placed. No LT-slided lung sliding on POCUS, chest tube placed. ROSC achieved after dual-sequential defibrillation. Levo started.    Code STEMI called. Pt had no mental status at that time. Code STEMI cancelled    PAST MEDICAL & SURGICAL HISTORY  DM (diabetes mellitus)    CAD (coronary artery disease)    CKD (chronic kidney disease)    HTN (hypertension)    HLD (hyperlipidemia)    CHF (congestive heart failure)        FAMILY HISTORY:  FAMILY HISTORY:  Unable to obtain due to mental status    SOCIAL HISTORY:  Unable to obtain due to mental status  ALLERGIES:  No Known Allergies      MEDICATIONS:  MEDICATIONS  (STANDING):  aMIOdarone Infusion 1 mG/Min (33.3 mL/Hr) IV Continuous <Continuous>  chlorhexidine 0.12% Liquid 15 milliLiter(s) Oral Mucosa every 12 hours    MEDICATIONS  (PRN):      HOME MEDICATIONS:  Home Medications:  aspirin 81 mg oral tablet, chewable: 1 tab(s) orally once a day (19 Mar 2022 14:36)  atorvastatin 10 mg oral tablet: 1 tab(s) orally once a day (at bedtime) (19 Mar 2022 14:36)  cinacalcet 30 mg oral tablet: 1 tab(s) orally once a day (19 Mar 2022 14:36)  furosemide 80 mg oral tablet: 1 tab(s) orally 2 times a day (19 Mar 2022 14:36)  Insulin Aspart FlexPen 100 units/mL injectable solution: 3 unit(s) injectable 3 times a day (19 Mar 2022 14:36)  Lantus 100 units/mL subcutaneous solution: 10 unit(s) subcutaneous once a day (at bedtime) (25 Mar 2022 16:05)  Procrit 20,000 units/mL injectable solution: 1 milliliter(s) injectable every 2 weeks (19 Mar 2022 14:36)  Renvela 800 mg oral tablet: 1 tab(s) orally 3 times a day (with meals) (19 Mar 2022 14:36)  Rocaltrol 0.5 mcg oral capsule: 2 cap(s) orally once a day (25 Mar 2022 15:09)  sodium bicarbonate 650 mg oral tablet: 2 tab(s) orally 3 times a day (19 Mar 2022 14:36)  Toprol- mg oral tablet, extended release: 1 tab(s) orally once a day (19 Mar 2022 14:36)      VITALS:   T(F): 99 (05-16 @ 15:13), Max: 99 (05-16 @ 15:13)  HR: 55 (05-16 @ 15:13) (45 - 55)  BP: --  BP(mean): --  RR: 20 (05-16 @ 15:13) (20 - 20)  SpO2: 98% (05-16 @ 15:13) (85% - 98%)    I&O's Summary      REVIEW OF SYSTEMS:  Unable to obtain due to mental status    PHYSICAL EXAM:  NEURO: No gag or corneal reflex. No sedation  GEN: Intubated  NECK: no thyroid enlargement, no JVD  LUNGS: Diminished lung sounds throughout   CARDIOVASCULAR: S1/S2 present, irregular rhythm , systolic murmur appreciated at apex  ABD: Soft, non-tender, non-distended, +BS  EXT: No BOO  SKIN: Intact, Cyanotic b/l LE and UE    LABS:                        9.7    9.22  )-----------( 102      ( 16 May 2022 14:10 )             32.4     05-16    165<HH>  |  105  |  20  ----------------------------<  247<H>  2.8<L>   |  45<HH>  |  2.5<H>    Ca    10.4<H>      16 May 2022 14:10  Mg     >9.7     05-16    TPro  4.6<L>  /  Alb  2.0<L>  /  TBili  0.5  /  DBili  x   /  AST  68<H>  /  ALT  41  /  AlkPhos  51  05-16      Troponin T, Serum: 0.08 ng/mL *HH* (05-16-22 @ 14:10)    CARDIAC MARKERS ( 16 May 2022 14:10 )  x     / 0.08 ng/mL / x     / x     / x            Troponin trend:    Serum Pro-Brain Natriuretic Peptide: 50326 pg/mL (05-16-22 @ 14:10)    03-20 Chol 91 LDL -- HDL 39<L> Trig 58      RADIOLOGY:  -CXR:  < from: Xray Chest 1 View-PORTABLE IMMEDIATE (05.16.22 @ 14:30) >  Bilateral opacifications. Support devices as described.    < end of copied text >    -TTE:  < from: TTE Echo Complete w/o Contrast w/ Doppler (03.20.22 @ 10:47) >   1. Left ventricular ejection fraction, by visual estimation, is <20%.   2. Severely decreased global left ventricular systolic function.   3. Spectral Doppler shows pseudonormal pattern of left ventricular   myocardial filling (Grade II diastolic dysfunction).   4. Severely enlarged left atrium.   5. Severely enlarged right atrium.   6. The mitral valve leaflets are tethered due to reduced systolic   function and elevated LVDP.   7. Moderate mitral regurgitation.   8. Moderate tricuspid regurgitation.   9. Mild aortic stenosis.  10. Mild aortic regurgitation.  11. Estimated pulmonary artery systolic pressure is 50.8 mmHg assuming a   right atrial pressure of 8 mmHg, which is consistent with moderate   pulmonary hypertension.  12. LA volume Index is 60.0 ml/m² ml/m2.    < end of copied text >    -CCTA:  -STRESS TEST:  < from: NM Nuclear Stress Pharmacologic Multiple (01.31.22 @ 09:57) >  The overall quality of the study is good  Rotational cine display reveals no artifact  SPECT images demonstrate large in size severe in severity distal   anteroapical, apical defect without reversibility.    There is a large in size severe in severity inferior, inferoseptal defect   defect without reversibility..    The apex is devoid of thallium    < end of copied text >    -CATHETERIZATION:    ECG:    TELEMETRY EVENTS:   HPI:    76yM pmhx ESRD on HD, CAD s/p CABG, Ischemic cardiomyopathy, HFrEF (EF <20%), HLD, HTN BIBEMS after being found unresponsive in hemodialysis (per EMS pt received most of treatment); no pulse so CPR initiated, found to be in vfib, received 4x epi and amio 300mg, down time in field 20min. ROSC achieved as pt was being wheeled into ED. Pt lost pulses shortly after so CPR reinitiated. Pt intubated in field and tube confirmed via direct laryngoscopy. Epi given per ACLS protocol, calcium chloride 1g x3, bicarb, amio given; persistent vfib during pulse checks. A-line and central line placed. No LT-slided lung sliding on POCUS, chest tube placed. ROSC achieved after dual-sequential defibrillation. Levo started.    Code STEMI called. Pt had no mental status at that time. Code STEMI cancelled    PAST MEDICAL & SURGICAL HISTORY  DM (diabetes mellitus)    CAD (coronary artery disease)    CKD (chronic kidney disease)    HTN (hypertension)    HLD (hyperlipidemia)    CHF (congestive heart failure)        FAMILY HISTORY:  FAMILY HISTORY:  Unable to obtain due to mental status    SOCIAL HISTORY:  Unable to obtain due to mental status  ALLERGIES:  No Known Allergies      MEDICATIONS:  MEDICATIONS  (STANDING):  aMIOdarone Infusion 1 mG/Min (33.3 mL/Hr) IV Continuous <Continuous>  chlorhexidine 0.12% Liquid 15 milliLiter(s) Oral Mucosa every 12 hours    MEDICATIONS  (PRN):      HOME MEDICATIONS:  Home Medications:  aspirin 81 mg oral tablet, chewable: 1 tab(s) orally once a day (19 Mar 2022 14:36)  atorvastatin 10 mg oral tablet: 1 tab(s) orally once a day (at bedtime) (19 Mar 2022 14:36)  cinacalcet 30 mg oral tablet: 1 tab(s) orally once a day (19 Mar 2022 14:36)  furosemide 80 mg oral tablet: 1 tab(s) orally 2 times a day (19 Mar 2022 14:36)  Insulin Aspart FlexPen 100 units/mL injectable solution: 3 unit(s) injectable 3 times a day (19 Mar 2022 14:36)  Lantus 100 units/mL subcutaneous solution: 10 unit(s) subcutaneous once a day (at bedtime) (25 Mar 2022 16:05)  Procrit 20,000 units/mL injectable solution: 1 milliliter(s) injectable every 2 weeks (19 Mar 2022 14:36)  Renvela 800 mg oral tablet: 1 tab(s) orally 3 times a day (with meals) (19 Mar 2022 14:36)  Rocaltrol 0.5 mcg oral capsule: 2 cap(s) orally once a day (25 Mar 2022 15:09)  sodium bicarbonate 650 mg oral tablet: 2 tab(s) orally 3 times a day (19 Mar 2022 14:36)  Toprol- mg oral tablet, extended release: 1 tab(s) orally once a day (19 Mar 2022 14:36)      VITALS:   T(F): 99 (05-16 @ 15:13), Max: 99 (05-16 @ 15:13)  HR: 55 (05-16 @ 15:13) (45 - 55)  BP: --  BP(mean): --  RR: 20 (05-16 @ 15:13) (20 - 20)  SpO2: 98% (05-16 @ 15:13) (85% - 98%)    I&O's Summary      REVIEW OF SYSTEMS:  Unable to obtain due to mental status    PHYSICAL EXAM:  NEURO: No gag or corneal reflex. No sedation  GEN: Intubated  NECK: no thyroid enlargement, no JVD  LUNGS: Diminished lung sounds throughout   CARDIOVASCULAR: S1/S2 present, irregular rhythm , systolic murmur appreciated at apex  ABD: Soft, non-tender, non-distended, +BS  EXT: No BOO  SKIN: Intact, Cyanotic b/l LE and UE    LABS:                        9.7    9.22  )-----------( 102      ( 16 May 2022 14:10 )             32.4     05-16    165<HH>  |  105  |  20  ----------------------------<  247<H>  2.8<L>   |  45<HH>  |  2.5<H>    Ca    10.4<H>      16 May 2022 14:10  Mg     >9.7     05-16    TPro  4.6<L>  /  Alb  2.0<L>  /  TBili  0.5  /  DBili  x   /  AST  68<H>  /  ALT  41  /  AlkPhos  51  05-16      Troponin T, Serum: 0.08 ng/mL *HH* (05-16-22 @ 14:10)    CARDIAC MARKERS ( 16 May 2022 14:10 )  x     / 0.08 ng/mL / x     / x     / x            Troponin trend:    Serum Pro-Brain Natriuretic Peptide: 62850 pg/mL (05-16-22 @ 14:10)    03-20 Chol 91 LDL -- HDL 39<L> Trig 58      RADIOLOGY:  -CXR:  < from: Xray Chest 1 View-PORTABLE IMMEDIATE (05.16.22 @ 14:30) >  Bilateral opacifications. Support devices as described.    < end of copied text >    -TTE:  < from: TTE Echo Complete w/o Contrast w/ Doppler (03.20.22 @ 10:47) >   1. Left ventricular ejection fraction, by visual estimation, is <20%.   2. Severely decreased global left ventricular systolic function.   3. Spectral Doppler shows pseudonormal pattern of left ventricular   myocardial filling (Grade II diastolic dysfunction).   4. Severely enlarged left atrium.   5. Severely enlarged right atrium.   6. The mitral valve leaflets are tethered due to reduced systolic   function and elevated LVDP.   7. Moderate mitral regurgitation.   8. Moderate tricuspid regurgitation.   9. Mild aortic stenosis.  10. Mild aortic regurgitation.  11. Estimated pulmonary artery systolic pressure is 50.8 mmHg assuming a   right atrial pressure of 8 mmHg, which is consistent with moderate   pulmonary hypertension.  12. LA volume Index is 60.0 ml/m² ml/m2.    < end of copied text >    -CCTA:  -STRESS TEST:  < from: NM Nuclear Stress Pharmacologic Multiple (01.31.22 @ 09:57) >  The overall quality of the study is good  Rotational cine display reveals no artifact  SPECT images demonstrate large in size severe in severity distal   anteroapical, apical defect without reversibility.    There is a large in size severe in severity inferior, inferoseptal defect   defect without reversibility..    The apex is devoid of thallium    < end of copied text >    -CATHETERIZATION:    ECG:    TELEMETRY EVENTS:

## 2022-05-16 NOTE — ED PROCEDURE NOTE - CPROC ED INDICATIONS1
cardiac arrest/emergency venous access
critical patient
cardiac arrest/critical patient/monitoring purposes

## 2022-05-16 NOTE — CONSULT NOTE ADULT - SUBJECTIVE AND OBJECTIVE BOX
SUMMARY: HPI:      OVERNIGHT EVENTS:     ADMISSION SCORES:   GCS: HH: MF: NIHSS: ICH Score:    SEDATION SCORES:  RASS: CAM-ICU:     REVIEW OF SYSTEMS:     VITALS: [X] Reviewed    IMAGING/DATA: [X] Reviewed    IVF FLUIDS/MEDICATIONS: [X] Reviewed    ALLERGIES: Allergies    No Known Allergies    Intolerances        DEVICES:   [] Restraints [] PIVs [] ET tube [] central line [] PICC [] arterial line [] franco [] NGT/OGT [] EVD [] LD [] AIME/HMV [] LiCOX [] ICP monitor [] Trach [] PEG [] Chest Tube [] other:    EXAMINATION:  General: No acute distress  HEENT: Anicteric sclerae  Cardiac: [id number]  Lungs: Clear  Abdomen: Soft, non-tender, +BS  Extremities: No c/c/e  Skin/Incision Site: Clean, dry and intact  Neurologic: Mechanically vented, no sedation, no EO to noxious or verbal stimuli, roving eye movement, Left Pupil 7mm fixed, Right Pupil 4mm sluggish    ICU Vital Signs Last 24 Hrs  T(C): 37.2 (16 May 2022 15:13), Max: 37.2 (16 May 2022 15:13)  T(F): 99 (16 May 2022 15:13), Max: 99 (16 May 2022 15:13)  HR: 90 (16 May 2022 17:37) (45 - 90)  BP: --  BP(mean): --  ABP: 85/55 (16 May 2022 17:37) (85/55 - 123/56)  ABP(mean): 62 (16 May 2022 17:37) (62 - 74)  RR: 20 (16 May 2022 17:37) (20 - 20)  SpO2: 97% (16 May 2022 17:37) (85% - 98%)        Mode: AC/ CMV (Assist Control/ Continuous Mandatory Ventilation), RR (machine): 20, TV (machine): 450, FiO2: 100, PEEP: 8, ITime: 1, MAP: 14, PIP: 25    aMIOdarone Infusion 1 mG/Min (33.3 mL/Hr) IV Continuous <Continuous>  aMIOdarone Infusion 0.5 mG/Min (16.7 mL/Hr) IV Continuous <Continuous>  ampicillin/sulbactam  IVPB 3 Gram(s) IV Intermittent every 12 hours  chlorhexidine 0.12% Liquid 15 milliLiter(s) Oral Mucosa every 12 hours  norepinephrine Infusion 0.05 MICROgram(s)/kG/Min (3.28 mL/Hr) IV Continuous <Continuous>  pantoprazole    Tablet 40 milliGRAM(s) Oral before breakfast  sodium bicarbonate  Infusion 0.107 mEq/kG/Hr (50 mL/Hr) IV Continuous <Continuous>  sodium bicarbonate  Injectable 50 milliEquivalent(s) IV Push every 5 minutes  vasopressin Infusion 0.04 Unit(s)/Min (2.4 mL/Hr) IV Continuous <Continuous>      LABS:  Na: 165 (05-16 @ 14:10)  K: 2.8 (05-16 @ 14:10)  Cl: 105 (05-16 @ 14:10)  CO2: 45 (05-16 @ 14:10)  BUN: 20 (05-16 @ 14:10)  Cr: 2.5 (05-16 @ 14:10)  Glu: 247(05-16 @ 14:10)    Hgb: 9.7 (05-16 @ 14:10)  Hct: 32.4 (05-16 @ 14:10)  WBC: 9.22 (05-16 @ 14:10)  Plt: 102 (05-16 @ 14:10)    INR:   PTT:     LIVER FUNCTIONS - ( 16 May 2022 14:10 )  Alb: 2.0 g/dL / Pro: 4.6 g/dL / ALK PHOS: 51 U/L / ALT: 41 U/L / AST: 68 U/L / GGT: x           ABG - ( 16 May 2022 17:01 )  pH, Arterial: 7.00  pH, Blood: x     /  pCO2: 38    /  pO2: 165   / HCO3: 9     / Base Excess: -21.1 /  SaO2: 95.8      Imaging:  EXAM:  CT BRAIN [5/16/2022]:  Findings:  The ventricles and cortical sulci are prominent compatible with parenchymal volume loss. Area of encephalomalacia in the left parieto-occipital lobe, which may be chronic sequela of infarction. There is no acute intracranial hemorrhage, extra-axial fluid collection or midline shift.  Gray-white matter differentiation is maintained.  Trace fluid seen within the sphenoid sinuses. Partially imaged tube within the nasopharynx.  IMPRESSION:  No evidence of acute intracranial pathology.  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         NEUROCRITICAL CARE CONSULT NOTE    SUMMARY: HPI:      NCC consulted for TTM s/p cardiac arrest    REVIEW OF SYSTEMS:     VITALS: [X] Reviewed    IMAGING/DATA: [X] Reviewed    IVF FLUIDS/MEDICATIONS: [X] Reviewed    ALLERGIES: Allergies    No Known Allergies    Intolerances        DEVICES:   [] Restraints [] PIVs [] ET tube [] central line [] PICC [] arterial line [] franco [] NGT/OGT [] EVD [] LD [] AIME/HMV [] LiCOX [] ICP monitor [] Trach [] PEG [] Chest Tube [] other:    EXAMINATION:  General: No acute distress  HEENT: Anicteric sclerae  Cardiac: [id number]  Lungs: Clear  Abdomen: Soft, non-tender, +BS  Extremities: No c/c/e  Skin/Incision Site: Clean, dry and intact  Neurologic: Mechanically vented, no sedation, no EO to noxious or verbal stimuli, roving eye movement, Left Pupil 7mm fixed, Right Pupil 4mm sluggish    ICU Vital Signs Last 24 Hrs  T(C): 37.2 (16 May 2022 15:13), Max: 37.2 (16 May 2022 15:13)  T(F): 99 (16 May 2022 15:13), Max: 99 (16 May 2022 15:13)  HR: 90 (16 May 2022 17:37) (45 - 90)  BP: --  BP(mean): --  ABP: 85/55 (16 May 2022 17:37) (85/55 - 123/56)  ABP(mean): 62 (16 May 2022 17:37) (62 - 74)  RR: 20 (16 May 2022 17:37) (20 - 20)  SpO2: 97% (16 May 2022 17:37) (85% - 98%)        Mode: AC/ CMV (Assist Control/ Continuous Mandatory Ventilation), RR (machine): 20, TV (machine): 450, FiO2: 100, PEEP: 8, ITime: 1, MAP: 14, PIP: 25    aMIOdarone Infusion 1 mG/Min (33.3 mL/Hr) IV Continuous <Continuous>  aMIOdarone Infusion 0.5 mG/Min (16.7 mL/Hr) IV Continuous <Continuous>  ampicillin/sulbactam  IVPB 3 Gram(s) IV Intermittent every 12 hours  chlorhexidine 0.12% Liquid 15 milliLiter(s) Oral Mucosa every 12 hours  norepinephrine Infusion 0.05 MICROgram(s)/kG/Min (3.28 mL/Hr) IV Continuous <Continuous>  pantoprazole    Tablet 40 milliGRAM(s) Oral before breakfast  sodium bicarbonate  Infusion 0.107 mEq/kG/Hr (50 mL/Hr) IV Continuous <Continuous>  sodium bicarbonate  Injectable 50 milliEquivalent(s) IV Push every 5 minutes  vasopressin Infusion 0.04 Unit(s)/Min (2.4 mL/Hr) IV Continuous <Continuous>      LABS:  Na: 165 (05-16 @ 14:10)  K: 2.8 (05-16 @ 14:10)  Cl: 105 (05-16 @ 14:10)  CO2: 45 (05-16 @ 14:10)  BUN: 20 (05-16 @ 14:10)  Cr: 2.5 (05-16 @ 14:10)  Glu: 247(05-16 @ 14:10)    Hgb: 9.7 (05-16 @ 14:10)  Hct: 32.4 (05-16 @ 14:10)  WBC: 9.22 (05-16 @ 14:10)  Plt: 102 (05-16 @ 14:10)    INR:   PTT:     LIVER FUNCTIONS - ( 16 May 2022 14:10 )  Alb: 2.0 g/dL / Pro: 4.6 g/dL / ALK PHOS: 51 U/L / ALT: 41 U/L / AST: 68 U/L / GGT: x           ABG - ( 16 May 2022 17:01 )  pH, Arterial: 7.00  pH, Blood: x     /  pCO2: 38    /  pO2: 165   / HCO3: 9     / Base Excess: -21.1 /  SaO2: 95.8      Imaging:  EXAM:  CT BRAIN [5/16/2022]:  Findings:  The ventricles and cortical sulci are prominent compatible with parenchymal volume loss. Area of encephalomalacia in the left parieto-occipital lobe, which may be chronic sequela of infarction. There is no acute intracranial hemorrhage, extra-axial fluid collection or midline shift.  Gray-white matter differentiation is maintained.  Trace fluid seen within the sphenoid sinuses. Partially imaged tube within the nasopharynx.  IMPRESSION:  No evidence of acute intracranial pathology.  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         NEUROCRITICAL CARE CONSULT NOTE    SUMMARY: HPI:  76-year-old male with PMHx ESRD pm HMD, CAD s/p CABG, ischemic cardiomyopathy, HFrEF [EF < 20%], HLD, HTN BIBEMS found unresponsive in HMD, pulseless vfib, CPR initiated, received 4x epi and amio 300mg, down time in field 20min. ROSC achieved as pt was being wheeled into ED. Pt lost pulses shortly after so CPR reinitiated. Pt intubated in field and tube confirmed via direct laryngoscopy. Epi given per ACLS protocol, calcium chloride 1g x3, bicarb, amio given; persistent vfib during pulse checks. A-line and central line placed. No LT-slided lung sliding on POCUS, chest tube placed. ROSC achieved after dual-sequential defibrillation. Levo started. Code STEMI called. Pt had no mental status at that time. Code STEMI cancelled    NCC consulted for TTM s/p cardiac arrest    REVIEW OF SYSTEMS: Patient unable to participate in ROS due to neurologic status.     VITALS: [X] Reviewed    IMAGING/DATA: [X] Reviewed    IVF FLUIDS/MEDICATIONS: [X] Reviewed    ALLERGIES: Allergies    No Known Allergies    Intolerances    EXAMINATION:  General: No acute distress. grossly deconditioned   HEENT: Anicteric sclerae  Cardiac: [id number]  Lungs: Clear  Abdomen: Soft, non-tender, +BS  Extremities: No c/c/e  Skin/Incision Site: Clean, dry and intact  Neurologic: Mechanically vented, no sedation, no EO to noxious or verbal stimuli, roving eye movement, Left Pupil 7mm fixed, Right Pupil 4mm sluggish, no movement to noxious stimuli bilaterally throughout       ICU Vital Signs Last 24 Hrs  T(C): 37.2 (16 May 2022 15:13), Max: 37.2 (16 May 2022 15:13)  T(F): 99 (16 May 2022 15:13), Max: 99 (16 May 2022 15:13)  HR: 90 (16 May 2022 17:37) (45 - 90)  BP: --  BP(mean): --  ABP: 85/55 (16 May 2022 17:37) (85/55 - 123/56)  ABP(mean): 62 (16 May 2022 17:37) (62 - 74)  RR: 20 (16 May 2022 17:37) (20 - 20)  SpO2: 97% (16 May 2022 17:37) (85% - 98%)        Mode: AC/ CMV (Assist Control/ Continuous Mandatory Ventilation), RR (machine): 20, TV (machine): 450, FiO2: 100, PEEP: 8, ITime: 1, MAP: 14, PIP: 25    aMIOdarone Infusion 1 mG/Min (33.3 mL/Hr) IV Continuous <Continuous>  aMIOdarone Infusion 0.5 mG/Min (16.7 mL/Hr) IV Continuous <Continuous>  ampicillin/sulbactam  IVPB 3 Gram(s) IV Intermittent every 12 hours  chlorhexidine 0.12% Liquid 15 milliLiter(s) Oral Mucosa every 12 hours  norepinephrine Infusion 0.05 MICROgram(s)/kG/Min (3.28 mL/Hr) IV Continuous <Continuous>  pantoprazole    Tablet 40 milliGRAM(s) Oral before breakfast  sodium bicarbonate  Infusion 0.107 mEq/kG/Hr (50 mL/Hr) IV Continuous <Continuous>  sodium bicarbonate  Injectable 50 milliEquivalent(s) IV Push every 5 minutes  vasopressin Infusion 0.04 Unit(s)/Min (2.4 mL/Hr) IV Continuous <Continuous>      LABS:  Na: 165 (05-16 @ 14:10)  K: 2.8 (05-16 @ 14:10)  Cl: 105 (05-16 @ 14:10)  CO2: 45 (05-16 @ 14:10)  BUN: 20 (05-16 @ 14:10)  Cr: 2.5 (05-16 @ 14:10)  Glu: 247(05-16 @ 14:10)    Hgb: 9.7 (05-16 @ 14:10)  Hct: 32.4 (05-16 @ 14:10)  WBC: 9.22 (05-16 @ 14:10)  Plt: 102 (05-16 @ 14:10)    INR:   PTT:     LIVER FUNCTIONS - ( 16 May 2022 14:10 )  Alb: 2.0 g/dL / Pro: 4.6 g/dL / ALK PHOS: 51 U/L / ALT: 41 U/L / AST: 68 U/L / GGT: x           ABG - ( 16 May 2022 17:01 )  pH, Arterial: 7.00  pH, Blood: x     /  pCO2: 38    /  pO2: 165   / HCO3: 9     / Base Excess: -21.1 /  SaO2: 95.8      Imaging:  EXAM:  CT BRAIN [5/16/2022]:  Findings:  The ventricles and cortical sulci are prominent compatible with parenchymal volume loss. Area of encephalomalacia in the left parieto-occipital lobe, which may be chronic sequela of infarction. There is no acute intracranial hemorrhage, extra-axial fluid collection or midline shift.  Gray-white matter differentiation is maintained.  Trace fluid seen within the sphenoid sinuses. Partially imaged tube within the nasopharynx.  IMPRESSION:  No evidence of acute intracranial pathology.  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         NEUROCRITICAL CARE CONSULT NOTE    SUMMARY: HPI:  76-year-old male with PMHx ESRD pm HMD, CAD s/p CABG, ischemic cardiomyopathy, HFrEF [EF < 20%], HLD, HTN BIBEMS found unresponsive in HMD, pulseless vfib, CPR initiated, received 4x epi and amio 300mg, down time in field 20min. ROSC achieved as pt was being wheeled into ED. Pt lost pulses shortly after so CPR reinitiated. Pt intubated in field and tube confirmed via direct laryngoscopy. Epi given per ACLS protocol, calcium chloride 1g x3, bicarb, amio given; persistent vfib during pulse checks. A-line and central line placed. No LT-slided lung sliding on POCUS, chest tube placed. ROSC achieved after dual-sequential defibrillation [approximately 30-45 minutes]. Levo started. Code STEMI called. Pt had no mental status at that time. Code STEMI cancelled    NCC consulted for TTM s/p cardiac arrest    REVIEW OF SYSTEMS: Patient unable to participate in ROS due to neurologic status.     VITALS: [X] Reviewed    IMAGING/DATA: [X] Reviewed    IVF FLUIDS/MEDICATIONS: [X] Reviewed    ALLERGIES: Allergies    No Known Allergies    Intolerances    EXAMINATION:  General: No acute distress. grossly deconditioned   HEENT: Anicteric sclerae  Cardiac: [id number]  Lungs: Clear  Abdomen: Soft, non-tender, +BS  Extremities: No c/c/e  Skin/Incision Site: Clean, dry and intact  Neurologic: Mechanically vented, no sedation, no EO to noxious or verbal stimuli, roving eye movement, Left Pupil 7mm fixed, Right Pupil 4mm sluggish, no movement to noxious stimuli bilaterally throughout       ICU Vital Signs Last 24 Hrs  T(C): 37.2 (16 May 2022 15:13), Max: 37.2 (16 May 2022 15:13)  T(F): 99 (16 May 2022 15:13), Max: 99 (16 May 2022 15:13)  HR: 90 (16 May 2022 17:37) (45 - 90)  BP: --  BP(mean): --  ABP: 85/55 (16 May 2022 17:37) (85/55 - 123/56)  ABP(mean): 62 (16 May 2022 17:37) (62 - 74)  RR: 20 (16 May 2022 17:37) (20 - 20)  SpO2: 97% (16 May 2022 17:37) (85% - 98%)        Mode: AC/ CMV (Assist Control/ Continuous Mandatory Ventilation), RR (machine): 20, TV (machine): 450, FiO2: 100, PEEP: 8, ITime: 1, MAP: 14, PIP: 25    aMIOdarone Infusion 1 mG/Min (33.3 mL/Hr) IV Continuous <Continuous>  aMIOdarone Infusion 0.5 mG/Min (16.7 mL/Hr) IV Continuous <Continuous>  ampicillin/sulbactam  IVPB 3 Gram(s) IV Intermittent every 12 hours  chlorhexidine 0.12% Liquid 15 milliLiter(s) Oral Mucosa every 12 hours  norepinephrine Infusion 0.05 MICROgram(s)/kG/Min (3.28 mL/Hr) IV Continuous <Continuous>  pantoprazole    Tablet 40 milliGRAM(s) Oral before breakfast  sodium bicarbonate  Infusion 0.107 mEq/kG/Hr (50 mL/Hr) IV Continuous <Continuous>  sodium bicarbonate  Injectable 50 milliEquivalent(s) IV Push every 5 minutes  vasopressin Infusion 0.04 Unit(s)/Min (2.4 mL/Hr) IV Continuous <Continuous>      LABS:  Na: 165 (05-16 @ 14:10)  K: 2.8 (05-16 @ 14:10)  Cl: 105 (05-16 @ 14:10)  CO2: 45 (05-16 @ 14:10)  BUN: 20 (05-16 @ 14:10)  Cr: 2.5 (05-16 @ 14:10)  Glu: 247(05-16 @ 14:10)    Hgb: 9.7 (05-16 @ 14:10)  Hct: 32.4 (05-16 @ 14:10)  WBC: 9.22 (05-16 @ 14:10)  Plt: 102 (05-16 @ 14:10)    INR:   PTT:     LIVER FUNCTIONS - ( 16 May 2022 14:10 )  Alb: 2.0 g/dL / Pro: 4.6 g/dL / ALK PHOS: 51 U/L / ALT: 41 U/L / AST: 68 U/L / GGT: x           ABG - ( 16 May 2022 17:01 )  pH, Arterial: 7.00  pH, Blood: x     /  pCO2: 38    /  pO2: 165   / HCO3: 9     / Base Excess: -21.1 /  SaO2: 95.8      Imaging:  EXAM:  CT BRAIN [5/16/2022]:  Findings:  The ventricles and cortical sulci are prominent compatible with parenchymal volume loss. Area of encephalomalacia in the left parieto-occipital lobe, which may be chronic sequela of infarction. There is no acute intracranial hemorrhage, extra-axial fluid collection or midline shift.  Gray-white matter differentiation is maintained.  Trace fluid seen within the sphenoid sinuses. Partially imaged tube within the nasopharynx.  IMPRESSION:  No evidence of acute intracranial pathology.  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

## 2022-05-16 NOTE — ED PROVIDER NOTE - ATTENDING CONTRIBUTION TO CARE
I personally evaluated patient. I agree with the findings and plan with all documentation on chart except as documented  in my note.    Patient received a separate arrival notification for cardiac arrest.  History, review of systems, exam limited by severity of condition.    Patient arrived to the emergency department with active CPR and was intubated in the field.  History per EMS was patient is a 76-year-old male past medical history of end-stage renal disease on dialysis, hypertension, dyslipidemia, CHF, CAD who was found unresponsive and in cardiac arrest at dialysis.  Per EMS patient received most of his dialysis treatment and had this episode.  ACLS was initiated after no pulse was palpated.  Patient was found to be in V. fib with EMS and received epi x4 and was shocked.  Patient had transient return of spontaneous circulation.  No other history available at time of arrival.  Prior chart reviewed during resuscitation.    ACLS continued.  Airway checked via direct visualization.  Patient given immediate calcium and ACLS treatment.  Patient had bilateral breath sounds.  Emergent arterial line placed and patient was found to be in ventricular fibrillation during multiple pulse checks, shocked multiple times, including later dual sequential defibrillation. Patient had multiple bedside echoes at pulse checks.  Concern for no lung sliding on the left and emergent left-sided chest tube was placed.  After this intervention and dual sequential defibrillation, patient had return of spontaneous circulation.  Emergent central line placed for improved access and full lab work sent.  EKG performed and concerning for possible STEMI versus severe electrolyte abnormality.  Stat VBG performed and patient found to have significant lactic acidosis, mild hypokalemia.  Patient has hypermagnesemia.  Patient was loaded with amiodarone and started on amiodarone drip.  Patient was hypotensive and started on Levophed drip.  Chest x-ray done after return of spontaneous circulation and shows ET tube is in correct place.  Tube in correct place.  ICU consulted for care and cardiology evaluated patient at bedside.  Patient is not a candidate for emergent cath.  Patient had a total of 40 to 50 minutes downtime.  CT ordered as patient has unequal pupils status post ROSC.  Critical care team consulted for evaluation.    ED work up reviewed and results and plan of care discussed with patient family. Patient requires admission for further work up, monitoring, and management. Need for admission discussed with patients family, including critical care nature of condition and possible brain damage.

## 2022-05-16 NOTE — ED PROVIDER NOTE - CARE PLAN
1 Principal Discharge DX:	Cardiopulmonary arrest with successful resuscitation  Secondary Diagnosis:	Ventricular fibrillation  Secondary Diagnosis:	Hypokalemia  Secondary Diagnosis:	Hypernatremia  Secondary Diagnosis:	Pneumothorax

## 2022-05-16 NOTE — H&P ADULT - NSHPPHYSICALEXAM_GEN_ALL_CORE
General: Intubated and MV.  OFF sedation. The patient attempted to open his eyes to verbal stimulation ( called his name). Off sedation.   Heart: RRR, normal s1s2, no audible murmur   Lungs: Bilateral breath sounds  Abdomen: +BS, soft, nontender  LE: No BOO

## 2022-05-16 NOTE — GOALS OF CARE CONVERSATION - ADVANCED CARE PLANNING - CONVERSATION DETAILS
I explained to the family at bedside in the ED the situation  We discussed the possible scenarios and the possibility of having another cardiac arrest  We discussed the GOC in case cardiac arrest happens again  The family would like to continue full medical management at this time BUT they report that at this moment in the event of cardiac arrest, the patient is NOT to be resuscitated. No cardiac reanimation.  MOLST form has been signed by me, witnessed by KEZIA Chand and has been placed in the file in th ED.

## 2022-05-16 NOTE — ED PROVIDER NOTE - PHYSICAL EXAMINATION
Vital Signs: Reviewed  GEN: unresponsive  HEAD:  normocephalic, atraumatic  EYES:  conjunctivae without injection, drainage or discharge  ENMT:  nasal mucosa moist; mouth moist without ulcerations or lesions; throat moist without erythema, exudate, ulcerations or lesions  NECK:  supple  CARDIAC:  no spontaneous circulation  RESP:  intubated, no spontaneous respiration  ABDOMEN:  soft, nontender, nondistended  MUSCULOSKELETAL/NEURO:  no spontaneous movements  SKIN:  cool and dry

## 2022-05-16 NOTE — CHART NOTE - NSCHARTNOTEFT_GEN_A_CORE
STEMI code called in [facility]. I responded immediately. Pt is 76 Yr M PMH ESRD on HD, CAD s/p CABG, HFrEF (EF <20%), ICM, HTN presenting with Vfib arrest. Pt was in dialysis when he was found unresponsive, total time down 30-40 min s/p 5 shocks. Pt is intubated without sedation, on pressors. Case and EKG discussed with Interventional cardiologist. STEMI code cancelled. Recall if Pt mental status improves

## 2022-05-16 NOTE — ED PROCEDURE NOTE - CPROC ED INFUS LINE DETAIL1
The location was identified, and the area was draped and prepped./The catheter was placed using sterile technique./Ultrasound guidance was used./The guidewire was recovered./All lumen(s) aspirated and flushed without difficulty.

## 2022-05-16 NOTE — ED PROCEDURE NOTE - CPROC ED ARTER LINE DETAIL1
Using sterile technique, the correct location was identified, and a needle was inserted into the artery (specify in FT)./Positive blood return was obtained via the catheter./Connected to a pressurized flush line./Line was sutured in place./Hemostasis was achieved with direct pressure, and a dry sterile dressing applied./Ultrasound guidance was used.

## 2022-05-16 NOTE — CONSULT NOTE ADULT - SUBJECTIVE AND OBJECTIVE BOX
Patient is a 76y old  Male who presents with a chief complaint of     HPI:  76yM pmhx CKD5, CAD, CHF, HLD, HTN BIBEMS after being found unresponsive in hemodialysis (per EMS pt received most of treatment); no pulse so CPR initiated, found to be in vfib, received 4x epi and amio 300mg, down time in field 20min. ROSC achieved as pt was being wheeled into ED. Pt lost pulses shortly after so CPR reinitiated. Pt intubated in field and tube confirmed via direct laryngoscopy. Epi given per ACLS protocol, calcium chloride 1g x3, bicarb, amio given; persistent vfib during pulse checks. A-line and central line placed. No LT-slided lung sliding on POCUS, chest tube placed. ROSC achieved after dual-sequential defibrillation. Levo started. ECG post-ROSC showed STEMI, STEMI code called and fellow at bedside. Labs sent, CXR w/ tubes confirmed. Daughter at bedside.    PAST MEDICAL & SURGICAL HISTORY:  DM (diabetes mellitus)      CAD (coronary artery disease)      CKD (chronic kidney disease)      HTN (hypertension)      HLD (hyperlipidemia)      CHF (congestive heart failure)          SOCIAL HX:   Smoking   UTO                      ETOH     UTO                       Other UTO    FAMILY HISTORY:  :  No known cardiovascular family history     Review Of Systems:     All ROS are negative except per HPI       Allergies    No Known Allergies    Intolerances          PHYSICAL EXAM    ICU Vital Signs Last 24 Hrs  T(C): 37.2 (16 May 2022 15:13), Max: 37.2 (16 May 2022 15:13)  T(F): 99 (16 May 2022 15:13), Max: 99 (16 May 2022 15:13)  HR: 55 (16 May 2022 15:13) (45 - 55)  ABP: 123/56 (16 May 2022 15:13) (123/56 - 123/56)  ABP(mean): 74 (16 May 2022 15:13) (74 - 74)  RR: 20 (16 May 2022 15:13) (20 - 20)  SpO2: 98% (16 May 2022 15:13) (85% - 98%)      CONSTITUTIONAL:  Ill-appearing. NAD    ENT:   Airway patent,   Mouth with normal mucosa.   No thrush    EYES:   pupils equal,   round and reactive to light.    CARDIAC:   Normal rate,   Regular rhythm.    Heart sounds S1, S2.   No edema    RESPIRATORY:   ET+  Dec B/L BS  No wheezing   Normal chest expansion  No use of accessory muscles    GASTROINTESTINAL:  Abdomen soft   Non-tender,   No guarding,   + BS    MUSCULOSKELETAL:   Range of motion is not limited,  No clubbing, cyanosis    NEUROLOGICAL:   Not following commands    SKIN:   Skin normal color for race,   Warm and dry  No evidence of rash.    PSYCHIATRIC:   No apparent risk to self or others.        LABS:                          9.7    9.22  )-----------( 102      ( 16 May 2022 14:10 )             32.4                                               05-16    x   |  105  |  20  ----------------------------<  247<H>  2.8<L>   |  x   |  2.5<H>    Ca    10.4<H>      16 May 2022 14:10    TPro  4.6<L>  /  Alb  2.0<L>  /  TBili  0.5  /  DBili  x   /  AST  68<H>  /  ALT  41  /  AlkPhos  51  05-16                                                                                           LIVER FUNCTIONS - ( 16 May 2022 14:10 )  Alb: 2.0 g/dL / Pro: 4.6 g/dL / ALK PHOS: 51 U/L / ALT: 41 U/L / AST: 68 U/L / GGT: x                                                                                             Mode: AC/ CMV (Assist Control/ Continuous Mandatory Ventilation)  RR (machine): 20  TV (machine): 450  FiO2: 100  PEEP: 8  ITime: 1  MAP: 14  PIP: 25                                      ABG - ( 16 May 2022 14:17 )  pH, Arterial: 7.23  pH, Blood: x     /  pCO2: 41    /  pO2: 435   / HCO3: 17    / Base Excess: -9.7  /  SaO2: 98.2          X-Rays reviewed:                                                                                    ECHO    CXR interpreted by me: ET ok, dialysis catheter in place, chest tubes in place    MEDICATIONS  (STANDING):  aMIOdarone Infusion 1 mG/Min (33.3 mL/Hr) IV Continuous <Continuous>  chlorhexidine 0.12% Liquid 15 milliLiter(s) Oral Mucosa every 12 hours    MEDICATIONS  (PRN):         Patient is a 76y old  Male who presents with a chief complaint of     HPI:  76yM pmhx ESRD on HD, CAD, CHF, HLD, HTN BIBEMS after being found unresponsive in hemodialysis (per EMS pt received most of treatment); no pulse so CPR initiated, found to be in vfib, received 4x epi and amio 300mg, down time in field 20min. ROSC achieved as pt was being wheeled into ED. Pt lost pulses shortly after so CPR reinitiated. Pt intubated in field and tube confirmed via direct laryngoscopy. Epi given per ACLS protocol, calcium chloride 1g x3, bicarb, amio given; persistent vfib during pulse checks. A-line and central line placed. No LT-slided lung sliding on POCUS, chest tube placed. ROSC achieved after dual-sequential defibrillation. Levo started. ECG post-ROSC showed STEMI, STEMI code called and fellow at bedside. Labs sent, CXR w/ tubes confirmed. Daughter at bedside. started on pressors called to evaluate    PAST MEDICAL & SURGICAL HISTORY:  DM (diabetes mellitus)      CAD (coronary artery disease)      CKD (chronic kidney disease)      HTN (hypertension)      HLD (hyperlipidemia)      CHF (congestive heart failure)          SOCIAL HX:   Smoking   UTO                      ETOH     UTO                       Other UTO    FAMILY HISTORY:  :  No known cardiovascular family history     Review Of Systems:     All ROS are negative except per HPI       Allergies    No Known Allergies    Intolerances          PHYSICAL EXAM    ICU Vital Signs Last 24 Hrs  T(C): 37.2 (16 May 2022 15:13), Max: 37.2 (16 May 2022 15:13)  T(F): 99 (16 May 2022 15:13), Max: 99 (16 May 2022 15:13)  HR: 55 (16 May 2022 15:13) (45 - 55)  ABP: 123/56 (16 May 2022 15:13) (123/56 - 123/56)  ABP(mean): 74 (16 May 2022 15:13) (74 - 74)  RR: 20 (16 May 2022 15:13) (20 - 20)  SpO2: 98% (16 May 2022 15:13) (85% - 98%)      CONSTITUTIONAL:  Ill-appearing    ENT:   Airway patent,   Mouth with normal mucosa.   No thrush    EYES:   pupil dilated, sluggish    CARDIAC:   Normal rate,   Regular rhythm.    Heart sounds S1, S2.   No edema    RESPIRATORY:   ET+  Dec B/L BS      GASTROINTESTINAL:  Abdomen soft   Non-tender,   No guarding,   + BS    MUSCULOSKELETAL:   Range of motion is not limited,  No clubbing, cyanosis    NEUROLOGICAL:   Not following commands        LABS:                          9.7    9.22  )-----------( 102      ( 16 May 2022 14:10 )             32.4                                               05-16    x   |  105  |  20  ----------------------------<  247<H>  2.8<L>   |  x   |  2.5<H>    Ca    10.4<H>      16 May 2022 14:10    TPro  4.6<L>  /  Alb  2.0<L>  /  TBili  0.5  /  DBili  x   /  AST  68<H>  /  ALT  41  /  AlkPhos  51  05-16                                                                                           LIVER FUNCTIONS - ( 16 May 2022 14:10 )  Alb: 2.0 g/dL / Pro: 4.6 g/dL / ALK PHOS: 51 U/L / ALT: 41 U/L / AST: 68 U/L / GGT: x                                                                                             Mode: AC/ CMV (Assist Control/ Continuous Mandatory Ventilation)  RR (machine): 20  TV (machine): 450  FiO2: 100  PEEP: 8  ITime: 1  MAP: 14  PIP: 25                                      ABG - ( 16 May 2022 14:17 )  pH, Arterial: 7.23  pH, Blood: x     /  pCO2: 41    /  pO2: 435   / HCO3: 17    / Base Excess: -9.7  /  SaO2: 98.2          X-Rays reviewed:                                                                                    ECHO    CXR interpreted by me: ET ok, dialysis catheter in place, chest tubes in place    MEDICATIONS  (STANDING):  aMIOdarone Infusion 1 mG/Min (33.3 mL/Hr) IV Continuous <Continuous>  chlorhexidine 0.12% Liquid 15 milliLiter(s) Oral Mucosa every 12 hours    MEDICATIONS  (PRN):

## 2022-05-16 NOTE — CONSULT NOTE ADULT - ASSESSMENT
76yM pmhx ESRD on HD, CAD s/p CABG, Ischemic cardiomyopathy, HFrEF (EF <20%), HLD, HTN BIBEMS after being found unresponsive in hemodialysis    Vfib Arrest s/p ROSC 40mins  Acute hypoxic respiratory failure s/p intubation  Questionable anoxic brain injury  L PTX s/p chest tubes  Hx ESRD on HD  Hx Ischemic cardiomyopathy EF < 20%  Hx LBBB, Moderate MR, Moderate Pulm HTN    - Cont Amiodarone Drip protocol then switch to Amiodarone 200mg PO BID  - Keep K>4 and Mg > 2.5  - Avoid overload, Nephrology for dialysis to optimize fluid status  - Cont Aspirin, Plavix 75mg and Heparin drip if no contraindication  - Hold Entresto, Metoprolol until off pressors  - Echo  - If mental status improves then will consider cardiac cath  - Will follow 76yM pmhx ESRD on HD, CAD s/p CABG, Ischemic cardiomyopathy, HFrEF (EF <20%), HLD, HTN BIBEMS after being found unresponsive in hemodialysis.    Vfib Arrest s/p ROSC 40mins  Acute hypoxic respiratory failure s/p intubation  Questionable anoxic brain injury  L PTX s/p chest tubes  Hx ESRD on HD  Hx Ischemic cardiomyopathy EF < 20%  Hx LBBB, Moderate MR, Moderate Pulm HTN    - Cont Amiodarone Drip protocol then switch to Amiodarone 200mg PO BID  - Keep K>4 and Mg > 2.5  - Avoid overload, Nephrology for dialysis to optimize fluid status  - Cont Aspirin, Plavix 75mg and Heparin drip if no contraindication  - Hold Entresto, Metoprolol until off pressors  - Echo  - If mental status improves then will consider cardiac cath  - Will follow

## 2022-05-16 NOTE — CONSULT NOTE ADULT - ATTENDING COMMENTS
Events noted, sp CP arrest, ( 20/ and 30 min), cardiac event, HFrEF, on HD, TTE, EEG, repeat labs, CE, cardio eval, echo, very poor prognosis

## 2022-05-16 NOTE — H&P ADULT - ASSESSMENT
Impression:     #Cardiac Arrest ( With Vfib and ROSC achieved with 40-45 mins of resucitation in field and at the hold) s/p epi and multiple shocks  #HFrEF secondary to ischemic cardiac myopathy  #Severe Lactic acidosis 2/2 cardiac arrest/hypotension  #ESRD on HD (M, W and F)  #HO CAD  #HO HTN  #HO HLD    CNS: Off sedation for now. Precedex if needed for sedation. Targeted temperature management. EEG. Neuron-specific enolase. Neuro eval.     HEENT: Oral care    PULMONARY: Vent changes as follows: Dec TV to 400, inc RR to 28. Dec FiO2. Repeat ABG in PM. Target SpO2 92-96%, titrate oxygen as tolerated. Chest tube, monitor plateau, DP    CARDIOVASCULAR: Avoid volume overload ECHO. Trend CE. Asprin, Plavix and heparin gtt as per cardiology. Amiodarone gtt and switch to 200mg po q12hrs as of tomorrow.     GI: GI prophylaxis. NPO    RENAL: Follow up lytes. Correct as needed. Crystal. Repeat LA.     INFECTIOUS DISEASE: Follow up cultures. Procal. Unasyn for now ( renally adjusted)    HEMATOLOGICAL: DVT prophylaxis. Dimer, LE duplex.     ENDOCRINE:  Follow up FS. TSH.    MUSCULOSKELETAL: bedrest

## 2022-05-16 NOTE — CONSULT NOTE ADULT - ASSESSMENT
IMPRESSION:  Acute hypoxemic respiratory failure  S/p ROSC x2 (20min and 30min)  L PTX s/p chest tubes  HO HFrEF (EF <20%, no AICD), CAD    PLAN:    CNS: Hold sedation for now to assess mental status. Precedex if needed for sedation. Targeted temperature management. EEG. Neuron-specific enolase. Neuro eval.     HEENT: Oral care    PULMONARY:  HOB @ 45 degrees. Aspiration precautions. Vent changes as follows: Dec TV to 400, inc RR to 28. Dec FiO2. Repeat ABG in PM. Target SpO2 92-96%, titrate oxygen as tolerated. Chest tubes to water seal.    CARDIOVASCULAR: Avoid volume overload ECHO. Trend CE.     GI: GI prophylaxis. OG feeding.     RENAL: Follow up lytes. Correct as needed. Crystal. Repeat LA.     INFECTIOUS DISEASE: Follow up cultures. Procal. Unasyn for now.     HEMATOLOGICAL: DVT prophylaxis. Dimer, LE duplex.     ENDOCRINE:  Follow up FS. TSH.    MUSCULOSKELETAL: bedrest    Overall poor prognosis    MICU IMPRESSION:    Acute hypoxemic respiratory failure  S/p ROSC x2 (20min and 30min)/ highly anoxic brain injury  L PTX s/p chest tubes  HO HFrEF (EF <20%, no AICD), CAD  lactic acidosis  ESRD in HD  RO ACS    PLAN:    CNS: Hold sedation for now to assess mental status. Precedex if needed for sedation. Targeted temperature management. EEG. Neuron-specific enolase. Neuro eval.     HEENT: Oral care    PULMONARY:  HOB @ 45 degrees. Aspiration precautions. Vent changes as follows: Dec TV to 400, inc RR to 28. Dec FiO2. Repeat ABG in PM. Target SpO2 92-96%, titrate oxygen as tolerated. Chest tube, monitor plateau, DP    CARDIOVASCULAR: Avoid volume overload ECHO. Trend CE.     GI: GI prophylaxis. NPO    RENAL: Follow up lytes. Correct as needed. Crystal. Repeat LA.     INFECTIOUS DISEASE: Follow up cultures. Procal. Unasyn for now.     HEMATOLOGICAL: DVT prophylaxis. Dimer, LE duplex.     ENDOCRINE:  Follow up FS. TSH.    MUSCULOSKELETAL: bedrest    Overall poor prognosis    MICU

## 2022-05-16 NOTE — H&P ADULT - HISTORY OF PRESENT ILLNESS
76-year-old, M PMHX: ESRD on HD, CAD, CHF, HLD, HTN BIBEMS after being found unresponsive in hemodialysis (per EMS pt received most of treatment); no pulse so CPR initiated, found to be in vfib, received 4x epi and amio 300mg, down time in field 20min. ROSC achieved as pt was being wheeled into ED. Pt lost pulses shortly after so CPR reinitiated. Pt intubated in field and tube confirmed via direct laryngoscopy. Epi given per ACLS protocol, calcium chloride 1g x3, bicarb, amio given; persistent vfib during pulse checks. A-line and central line placed. No LT-slided lung sliding on POCUS, chest tube placed. ROSC achieved after dual-sequential defibrillation. Levo started. ECG post-ROSC showed STEMI, STEMI code called and fellow at bedside. Labs sent, CXR w/ tubes confirmed. Daughter at bedside. started on pressors called to evaluate

## 2022-05-16 NOTE — CONSULT NOTE ADULT - ASSESSMENT
ASSESSMENT:76-year-old male s/p cardiac arrest x2. NCC consulted to TTM.      RECOMMENDATIONS:  - TTM to goal 32-33C for 24 hrs via [place]  - Slow rewarm 0.25/hour over 24 hours until normothermic  - Aggressive prevention of secondary injury:         - Avoid fevers         - Use Basil Hugger for skin counter-warming to prevent shivering         - Continue BSAS protocol to monitor for shivering, which will increase metabolic demand         - Avoid hypoxia         - Avoid hypotension         - Maintain normonatremia         - Buspar 30mg q12hrs for shivering         - Can utilize Magnesium gtt 4grams/250cc, rate 1-3cc/min, goal Magnesium 3-4  - Assess for neuroprognostication 48-72 hours off sedation post rewarming       Raquel Gomez, [facility]  [phone] ASSESSMENT:76-year-old male s/p cardiac arrest x2. NCC consulted to TTM.      RECOMMENDATIONS:  - Video EEG  - TTM to goal 32-33C for 24 hrs via [place]  - Slow rewarm 0.25/hour over 24 hours until normothermic  - Aggressive prevention of secondary injury:         - Avoid fevers         - Use Basil Hugger for skin counter-warming to prevent shivering         - Continue BSAS protocol to monitor for shivering, which will increase metabolic demand         - Avoid hypoxia         - Avoid hypotension         - Maintain normonatremia         - Buspar 30mg q12hrs for shivering         - Can utilize Magnesium gtt 4grams/250cc, rate 1-3cc/min, goal Magnesium 3-4  - Assess for neuroprognostication 48-72 hours off sedation post rewarming       Raquel Gomez, [employer]  [phone]

## 2022-05-16 NOTE — ED PROVIDER NOTE - PROGRESS NOTE DETAILS
AG: first opportunity to document. 76yM pmhx CKD5, CAD, CHF, HLD, HTN BIBEMS after being found unresponsive in hemodialysis (per EMS pt received most of treatment); no pulse so CPR initiated, found to be in vfib, received 4x epi and amio 300mg, down time in field 20min. ROSC achieved as pt was being wheeled into ED. Pt lost pulses shortly after so CPR reinitiated. Pt intubated in field and tube confirmed via direct laryngoscopy. Epi given per ACLS protocol, calcium chloride 1g x3, bicarb, amio given; persistent vfib during pulse checks. A-line and central line placed. No LT-slided lung sliding on POCUS, chest tube placed. ROSC achieved after dual-sequential defibrillation. Levo started. ECG post-ROSC showed STEMI, STEMI code called and fellow at bedside. Labs sent, CXR w/ tubes confirmed. Daughter at bedside. AG: neurocrit consulted for cooling/normothermia protocol, mental status assessment.

## 2022-05-16 NOTE — ED PROCEDURE NOTE - CPROC ED POST RADIOGRAPHY1
post-procedure radiography performed/no pneumothorax/chest tube in correct position
post procedure radiography not performed

## 2022-05-16 NOTE — ED PROVIDER NOTE - CLINICAL SUMMARY MEDICAL DECISION MAKING FREE TEXT BOX
Patient received a separate arrival notification for cardiac arrest.  History, review of systems, exam limited by severity of condition.    Patient arrived to the emergency department with active CPR and was intubated in the field.  History per EMS was patient is a 76-year-old male past medical history of end-stage renal disease on dialysis, hypertension, dyslipidemia, CHF, CAD who was found unresponsive and in cardiac arrest at dialysis.  Per EMS patient received most of his dialysis treatment and had this episode.  ACLS was initiated after no pulse was palpated.  Patient was found to be in V. fib with EMS and received epi x4 and was shocked.  Patient had transient return of spontaneous circulation.  No other history available at time of arrival.  Prior chart reviewed during resuscitation.    ACLS continued.  Airway checked via direct visualization.  Patient given immediate calcium and ACLS treatment.  Patient had bilateral breath sounds.  Emergent arterial line placed and patient was found to be in ventricular fibrillation during multiple pulse checks, shocked multiple times, including later dual sequential defibrillation. Patient had multiple bedside echoes at pulse checks.  Concern for no lung sliding on the left and emergent left-sided chest tube was placed.  After this intervention and dual sequential defibrillation, patient had return of spontaneous circulation.  Emergent central line placed for improved access and full lab work sent.  EKG performed and concerning for possible STEMI versus severe electrolyte abnormality.  Stat VBG performed and patient found to have significant lactic acidosis, mild hypokalemia.  Patient has hypermagnesemia.  Patient was loaded with amiodarone and started on amiodarone drip.  Patient was hypotensive and started on Levophed drip.  Chest x-ray done after return of spontaneous circulation and shows ET tube is in correct place.  Tube in correct place.  ICU consulted for care and cardiology evaluated patient at bedside.  Patient is not a candidate for emergent cath.  Patient had a total of 40 to 50 minutes downtime.  CT ordered as patient has unequal pupils status post ROSC.  Critical care team consulted for evaluation.    ED work up reviewed and results and plan of care discussed with patient family. Patient requires admission for further work up, monitoring, and management. Need for admission discussed with patients family, including critical care nature of condition and possible brain damage.

## 2022-05-16 NOTE — ED PROCEDURE NOTE - CPROC ED TIME OUT STATEMENT1
“Patient's name, , procedure and correct site were confirmed during the [place] Timeout.”
“Patient's name, , procedure and correct site were confirmed during the [place] Timeout.”
“Patient's name, , procedure and correct site were confirmed during the [place] Timeout.”

## 2022-05-16 NOTE — H&P ADULT - NSHPLABSRESULTS_GEN_ALL_CORE
WBC Count: 9.22 K/uL   RBC Count: 3.14 M/uL   Hemoglobin: 9.7 g/dL   Hematocrit: 32.4 %   Mean Cell Volume: 103.2 fL   Mean Cell Hemoglobin: 30.9 pg   Mean Cell Hemoglobin Conc: 29.9 g/dL   Red Cell Distrib Width: 17.1 %   Platelet Count - Automated: 102: Smear Reviewed, Result Confirmed. K/uL   Auto Neutrophil #: 4.36 K/uL   Auto Lymphocyte #: 4.28 K/uL   Auto Monocyte #: 0.08 K/uL   Auto Eosinophil #: 0.33 K/uL   Auto Basophil #: 0.08 K/uL   Auto Neutrophil %: 45.5: Differential percentages must be correlated with absolute numbers for   clinical significance. %   Auto Lymphocyte %: 46.4 %   Auto Monocyte %: 0.9 %   Auto Eosinophil %: 3.6 %   Auto Basophil %: 0.9 %   Nucleated RBC: NA: Manual NRBC performed. /100 WBCs     Sodium, Serum: 165: Critical value:   called to/read back by dr reed @ 3:30pm 5/16/22 mmol/L   Potassium, Serum: 2.8 mmol/L   Chloride, Serum: 105 mmol/L   Carbon Dioxide, Serum: 45: Critical value:   called to/read back by dr reed @ 3:30pm 5/16/22 mmol/L   Anion Gap, Serum: 15: called to/read back by dr reed @ 3:30pm 5/16/22 mmol/L   Blood Urea Nitrogen, Serum: 20 mg/dL   Creatinine, Serum: 2.5 mg/dL   Glucose, Serum: 247 mg/dL   Calcium, Total Serum: 10.4 mg/dL   Protein Total, Serum: 4.6 g/dL   Albumin, Serum: 2.0 g/dL   Bilirubin Total, Serum: 0.5 mg/dL   Alkaline Phosphatase, Serum: 51 U/L   Aspartate Aminotransferase (AST/SGOT): 68 U/L   Alanine Aminotransferase (ALT/SGPT): 41 U/L     < from: Xray Chest 1 View-PORTABLE IMMEDIATE (05.16.22 @ 14:30) >  Impression:    Bilateral opacifications. Support devices as described.    < end of copied text >    < from: CT Head No Cont (05.16.22 @ 16:55) >  IMPRESSION:    No evidence of acute intracranial pathology.    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    < end of copied text >

## 2022-05-17 NOTE — DISCHARGE NOTE FOR THE EXPIRED PATIENT - HOSPITAL COURSE
76-year-old, M PMHX: ESRD on HD, CAD, CHF, HLD, HTN BIBEMS after being found unresponsive in hemodialysis (per EMS pt received most of treatment); no pulse so CPR initiated, found to be in vfib, received 4x epi and amio 300mg, down time in field 20min. ROSC achieved as pt was being wheeled into ED. Pt lost pulses shortly after so CPR reinitiated. Pt intubated in field and tube confirmed via direct laryngoscopy. Epi given per ACLS protocol, calcium chloride 1g x3, bicarb, amio given; persistent vfib during pulse checks. A-line and central line placed. No LT-slided lung sliding on POCUS, chest tube placed. ROSC achieved after dual-sequential defibrillation. Levo started. ECG post-ROSC showed STEMI, STEMI code called and fellow at bedside. Amio drip initiated. Cardiology and NeuroICU consulted. TTM initiated. Labs sent, CXR w/ tubes confirmed. Daughter at bedside. started on pressors.    Admitted to ICU. Hemoglobin downtrending, labs consistent with DIC. Given PRBC, FFP. Despite being on 3 pressors, blood pressure consistently low. Patient was made DNR by family. Patient  by cardiopulmonary criteria @ 07:48

## 2022-05-17 NOTE — CHART NOTE - NSCHARTNOTEFT_GEN_A_CORE
5.9    10.57 )-----------( 76       ( 17 May 2022 01:40 )             19.6     PT/INR - ( 16 May 2022 20:22 )   PT: 21.10 sec;   INR: 1.85 ratio    PTT - ( 17 May 2022 01:40 )  PTT:>200.0 sec  D-Dimer Assay, Quantitative: >39685: Manufacturers recommended Cut off for VTE is 230 ng/ml D-DU ng/mL DDU (05.16.22 @ 20:22)  Fibrinogen Assay: 163.0 mg/dL (05.17.22 @ 02:39)      Discussed patient labs and Vitals with Dr. Mcneill. Patient on maxed 3 pressors. 1 unit PRBC and FFP ordered as per recommendations. will repeat ABG and give bicarb as needed to help with acidosis and blood pressure for now.

## 2022-05-19 DIAGNOSIS — E87.2 ACIDOSIS: ICD-10-CM

## 2022-05-19 DIAGNOSIS — I08.3 COMBINED RHEUMATIC DISORDERS OF MITRAL, AORTIC AND TRICUSPID VALVES: ICD-10-CM

## 2022-05-19 DIAGNOSIS — Z99.2 DEPENDENCE ON RENAL DIALYSIS: ICD-10-CM

## 2022-05-19 DIAGNOSIS — E83.41 HYPERMAGNESEMIA: ICD-10-CM

## 2022-05-19 DIAGNOSIS — I13.2 HYPERTENSIVE HEART AND CHRONIC KIDNEY DISEASE WITH HEART FAILURE AND WITH STAGE 5 CHRONIC KIDNEY DISEASE, OR END STAGE RENAL DISEASE: ICD-10-CM

## 2022-05-19 DIAGNOSIS — E87.0 HYPEROSMOLALITY AND HYPERNATREMIA: ICD-10-CM

## 2022-05-19 DIAGNOSIS — N18.6 END STAGE RENAL DISEASE: ICD-10-CM

## 2022-05-19 DIAGNOSIS — E11.22 TYPE 2 DIABETES MELLITUS WITH DIABETIC CHRONIC KIDNEY DISEASE: ICD-10-CM

## 2022-05-19 DIAGNOSIS — I95.9 HYPOTENSION, UNSPECIFIED: ICD-10-CM

## 2022-05-19 DIAGNOSIS — I46.2 CARDIAC ARREST DUE TO UNDERLYING CARDIAC CONDITION: ICD-10-CM

## 2022-05-19 DIAGNOSIS — J93.9 PNEUMOTHORAX, UNSPECIFIED: ICD-10-CM

## 2022-05-19 DIAGNOSIS — J96.01 ACUTE RESPIRATORY FAILURE WITH HYPOXIA: ICD-10-CM

## 2022-05-19 DIAGNOSIS — I25.10 ATHEROSCLEROTIC HEART DISEASE OF NATIVE CORONARY ARTERY WITHOUT ANGINA PECTORIS: ICD-10-CM

## 2022-05-19 DIAGNOSIS — I46.9 CARDIAC ARREST, CAUSE UNSPECIFIED: ICD-10-CM

## 2022-05-19 DIAGNOSIS — Z66 DO NOT RESUSCITATE: ICD-10-CM

## 2022-05-19 DIAGNOSIS — Z79.82 LONG TERM (CURRENT) USE OF ASPIRIN: ICD-10-CM

## 2022-05-19 DIAGNOSIS — D65 DISSEMINATED INTRAVASCULAR COAGULATION [DEFIBRINATION SYNDROME]: ICD-10-CM

## 2022-05-19 DIAGNOSIS — E78.5 HYPERLIPIDEMIA, UNSPECIFIED: ICD-10-CM

## 2022-05-19 DIAGNOSIS — E87.6 HYPOKALEMIA: ICD-10-CM

## 2022-05-19 DIAGNOSIS — I49.01 VENTRICULAR FIBRILLATION: ICD-10-CM

## 2022-05-19 DIAGNOSIS — I50.22 CHRONIC SYSTOLIC (CONGESTIVE) HEART FAILURE: ICD-10-CM

## 2022-05-19 DIAGNOSIS — I25.5 ISCHEMIC CARDIOMYOPATHY: ICD-10-CM

## 2022-05-19 DIAGNOSIS — I27.20 PULMONARY HYPERTENSION, UNSPECIFIED: ICD-10-CM

## 2022-05-19 DIAGNOSIS — Z79.4 LONG TERM (CURRENT) USE OF INSULIN: ICD-10-CM

## 2022-05-25 LAB — NSE FLD-MCNC: 48 NG/ML — HIGH (ref 0–17.6)

## 2022-06-21 ENCOUNTER — APPOINTMENT (OUTPATIENT)
Dept: CARDIOLOGY | Facility: CLINIC | Age: 77
End: 2022-06-21

## 2022-06-24 ENCOUNTER — APPOINTMENT (OUTPATIENT)
Dept: CARDIOLOGY | Facility: CLINIC | Age: 77
End: 2022-06-24

## 2023-04-06 NOTE — ED PROVIDER NOTE - NS ED MD DISPO SPECIAL CONSIDERATION1
Detail Level: Simple
Additional Notes: Patient consent was obtained to proceed with the visit and recommended plan of care after discussion of all risks and benefits, including the risks of COVID-19 exposure.
Low Suspicion of COVID-19

## 2025-07-22 NOTE — CARDIOLOGY SUMMARY
[de-identified] : 01/28/22:\par AW, septal, IW scar, LVEF 22%\par G2DD, severe LAE\par Mild ELLY\par Mild MR, TR\par Mild AS (1.6), AI.\par  [de-identified] : 03/30/22:\par SR, IVCD, prob old AWMI and IWMI, diffuse ST-T changes. [de-identified] : 01/31/22:\par Anterior, inferior, apical scar. No ischemia.\par LVEF 25%. [de-identified] : PVR of the LE 01/23/22:\par No significant PAD. No